# Patient Record
Sex: FEMALE | Race: WHITE | NOT HISPANIC OR LATINO | Employment: UNEMPLOYED | URBAN - METROPOLITAN AREA
[De-identification: names, ages, dates, MRNs, and addresses within clinical notes are randomized per-mention and may not be internally consistent; named-entity substitution may affect disease eponyms.]

---

## 2020-01-29 ENCOUNTER — HOSPITAL ENCOUNTER (EMERGENCY)
Facility: HOSPITAL | Age: 40
Discharge: HOME | End: 2020-01-29
Attending: EMERGENCY MEDICINE
Payer: MEDICARE

## 2020-01-29 VITALS
DIASTOLIC BLOOD PRESSURE: 90 MMHG | OXYGEN SATURATION: 98 % | WEIGHT: 200 LBS | TEMPERATURE: 98 F | HEART RATE: 90 BPM | RESPIRATION RATE: 15 BRPM | SYSTOLIC BLOOD PRESSURE: 140 MMHG

## 2020-01-29 DIAGNOSIS — M25.50 ARTHRALGIA: ICD-10-CM

## 2020-01-29 DIAGNOSIS — J11.1 INFLUENZA-LIKE ILLNESS: ICD-10-CM
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
## 2020-01-29 PROCEDURE — 99283 EMERGENCY DEPT VISIT LOW MDM: CPT

## 2020-01-29 PROCEDURE — 99284 EMERGENCY DEPT VISIT MOD MDM: CPT | Performed by: EMERGENCY MEDICINE

## 2020-01-29 RX ORDER — CLONAZEPAM 0.5 MG/1
0.5 TABLET ORAL DAILY
COMMUNITY

## 2020-01-29 RX ORDER — MULTIVITAMIN
1 TABLET ORAL DAILY
COMMUNITY

## 2020-01-29 RX ORDER — FUROSEMIDE 20 MG/1
20 TABLET ORAL DAILY
COMMUNITY

## 2020-01-29 RX ORDER — IBUPROFEN 200 MG
200 TABLET ORAL EVERY 6 HOURS PRN
COMMUNITY

## 2020-01-29 RX ORDER — MECLIZINE HYDROCHLORIDE 25 MG/1
25 TABLET ORAL EVERY 12 HOURS PRN
COMMUNITY

## 2020-01-29 RX ORDER — ACETAMINOPHEN 325 MG/1
650 TABLET ORAL EVERY 6 HOURS PRN
COMMUNITY

## 2020-01-29 RX ORDER — DIVALPROEX SODIUM 500 MG/1
1000 TABLET, EXTENDED RELEASE ORAL
COMMUNITY

## 2020-01-29 RX ORDER — SERTRALINE HYDROCHLORIDE 100 MG/1
100 TABLET, FILM COATED ORAL DAILY
COMMUNITY

## 2020-01-29 RX ORDER — ALBUTEROL SULFATE 90 UG/1
2 AEROSOL, METERED RESPIRATORY (INHALATION) EVERY 6 HOURS PRN
COMMUNITY

## 2020-01-29 RX ORDER — IBUPROFEN 400 MG/1
800 TABLET ORAL ONCE
Status: COMPLETED | OUTPATIENT
Start: 2020-01-29 | End: 2020-01-29

## 2020-01-29 RX ORDER — CLONAZEPAM 1 MG/1
1 TABLET ORAL 2 TIMES DAILY
COMMUNITY

## 2020-01-29 RX ORDER — CHOLECALCIFEROL (VITAMIN D3) 50 MCG
2000 TABLET ORAL DAILY
COMMUNITY

## 2020-01-29 RX ORDER — DOCUSATE SODIUM 100 MG/1
100 CAPSULE, LIQUID FILLED ORAL DAILY
COMMUNITY

## 2020-01-29 RX ORDER — CHLORAL HYDRATE 500 MG
1000 CAPSULE ORAL 3 TIMES DAILY
COMMUNITY

## 2020-01-29 RX ORDER — ACETAMINOPHEN 325 MG/1
975 TABLET ORAL ONCE
Status: COMPLETED | OUTPATIENT
Start: 2020-01-29 | End: 2020-01-29

## 2020-01-29 RX ORDER — HYDROCORTISONE 5 MG/1
15 TABLET ORAL
COMMUNITY

## 2020-01-29 RX ORDER — TRIAMCINOLONE ACETONIDE 1 MG/G
CREAM TOPICAL 2 TIMES DAILY PRN
COMMUNITY

## 2020-01-29 RX ORDER — QUETIAPINE 400 MG/1
400 TABLET, FILM COATED, EXTENDED RELEASE ORAL
COMMUNITY
End: 2022-03-21 | Stop reason: ALTCHOICE

## 2020-01-29 RX ADMIN — IBUPROFEN 800 MG: 400 TABLET ORAL at 13:21

## 2020-01-29 RX ADMIN — ACETAMINOPHEN 975 MG: 325 TABLET ORAL at 13:21

## 2020-01-29 NOTE — ED PROVIDER NOTES
History  Chief Complaint   Patient presents with   Tari Seller Fall     pt was [place] lost footing and fellon the buttock,c/o left shoulder pain     71-year-old female with history of mental retardation presenting to the emergency department for evaluation of arm and shoulder pain after fall  Patient comes with staff member from a group home where she resides, states that she had a trip and fall, the patient states that she caught herself the right wrist but has no right wrist pain  No head strike  No loss of consciousness  Patient says that she has also had some flu-like symptoms dizziness cough congestion for the past several days  No breathing difficulty, no fevers  Prior to Admission Medications   Prescriptions Last Dose Informant Patient Reported? Taking? albuterol (PROVENTIL HFA,VENTOLIN HFA) 90 mcg/act inhaler   Yes Yes   Sig: Inhale 2 puffs every 6 (six) hours as needed for wheezing      Facility-Administered Medications: None       Past Medical History:   Diagnosis Date    Adrenocortical insufficiency (HCC)     Disease of thyroid gland     hypo    Disorder of bone density and structure, unspecified     Epile[facility] [address] [facility]     Hyperlipidemia     [facility]     Mild intellectual disabilities     Psychiatric disorder     bipolar       History reviewed  No pertinent surgical history  History reviewed  No pertinent family history  I have reviewed and agree with the history as documented  Social History     Tobacco Use    Smoking status: Never Smoker    Smokeless tobacco: Never Used   Substance Use Topics    Alcohol use: Not Currently    Drug use: Not Currently        Review of Systems   Constitutional: Negative for appetite change, chills, fatigue and fever  HENT: Negative for sneezing and sore throat  Eyes: Negative for visual disturbance  Respiratory: Negative for cough, choking, chest tightness, shortness of breath and wheezing      Cardiovascular: Negative for chest pain and palpitations  Gastrointestinal: Negative for abdominal pain, constipation, diarrhea, nausea and vomiting  Genitourinary: Negative for difficulty urinating and dysuria  Musculoskeletal: Positive for arthralgias  Neurological: Positive for dizziness  Negative for weakness, light-headedness, numbness and headaches  All other systems reviewed and are negative  Physical Exam  Physical Exam   Constitutional: She is oriented to person, place, and time  She appears well-developed and well-nourished  No distress  HENT:   Head: Normocephalic and atraumatic  Mouth/Throat: Oropharynx is clear and moist    Eyes: Pupils are equal, round, and reactive to light  EOM are normal    Neck: No JVD present  No tracheal deviation present  Cardiovascular: Normal rate, regular rhythm, normal heart sounds and intact distal pulses  Exam reveals no gallop and no friction rub  No murmur heard  Pulmonary/Chest: Effort normal and breath sounds normal  No respiratory distress  She has no wheezes  She has no rales  Abdominal: Soft  Bowel sounds are normal  She exhibits no distension  There is no tenderness  There is no rebound and no guarding  Neurological: She is alert and oriented to person, place, and time  No cranial nerve deficit  She exhibits normal muscle tone  Skin: Skin is warm and dry  She is not diaphoretic  No pallor  Psychiatric: She has a normal mood and affect  Her behavior is normal    Nursing note and vitals reviewed        Vital Signs  ED Triage Vitals [01/29/20 1252]   Temperature Pulse Respirations Blood Pressure SpO2   98 °F (36 7 °C) 90 15 140/90 98 %      Temp Source Heart Rate Source Patient Position - Orthostatic VS BP Location FiO2 (%)   Oral Monitor Sitting Left arm --      Pain Score       Worst Possible Pain           Vitals:    01/29/20 1252   BP: 140/90   Pulse: 90   Patient Position - Orthostatic VS: Sitting         Visual Acuity      ED Medications  Medications   acetaminophen (TYLENOL) tablet 975 mg (975 mg Oral Incomplete 1/29/20 1321)   ibuprofen (MOTRIN) tablet 800 mg (800 mg Oral Incomplete 1/29/20 1321)       Diagnostic Studies  Results Reviewed     None                 No orders to display              Procedures  Procedures         ED Course                               MDM  Number of Diagnoses or Management Options  Arthralgia:   Fall:   Influenza-like illness:   Diagnosis management comments: 60-year-old female status post trip and fall, also has some URI symptoms, well-appearing here, no tenderness on exam, will give Tylenol Motrin for congestion,  stable for discharge back to group home          Disposition  Final diagnoses:   Fall   Arthralgia   Influenza-like illness     Time reflects when diagnosis was documented in both MDM as applicable and the Disposition within this note     Time User Action Codes Description Comment    1/29/2020  1:36 PM Janice Helton Add [H90  XXXA] Fall     1/29/2020  1:36 PM Jun Dalton Add [M25 50] Arthralgia     1/29/2020  1:37 PM Janice Helton Add [R69] Influenza-like illness       ED Disposition     ED Disposition Condition Date/Time Comment    Discharge Stable Wed Jan 29, 2020  1:36 PM [address]  discharge to home/self care  Follow-up Information    None         Patient's Medications   Discharge Prescriptions    No medications on file     No discharge procedures on file      ED Provider  Electronically Signed by           Adrian Aleman MD  01/29/20 9464

## 2021-05-17 ENCOUNTER — TELEPHONE (OUTPATIENT)
Dept: PAIN MEDICINE | Facility: CLINIC | Age: 41
End: 2021-05-17

## 2021-05-17 ENCOUNTER — APPOINTMENT (EMERGENCY)
Dept: RADIOLOGY | Facility: HOSPITAL | Age: 41
End: 2021-05-17
Payer: MEDICARE

## 2021-05-17 ENCOUNTER — HOSPITAL ENCOUNTER (EMERGENCY)
Facility: HOSPITAL | Age: 41
Discharge: HOME | End: 2021-05-17
Attending: EMERGENCY MEDICINE | Admitting: EMERGENCY MEDICINE
Payer: MEDICARE

## 2021-05-17 VITALS
TEMPERATURE: 98.2 F | SYSTOLIC BLOOD PRESSURE: 123 MMHG | WEIGHT: 199.96 LBS | OXYGEN SATURATION: 100 % | DIASTOLIC BLOOD PRESSURE: 83 MMHG | HEART RATE: 96 BPM | RESPIRATION RATE: 16 BRPM

## 2021-05-17 DIAGNOSIS — M54.9 BACK PAIN: Primary | ICD-10-CM

## 2021-05-17 DIAGNOSIS — M25.551 RIGHT HIP PAIN: ICD-10-CM

## 2021-05-17 LAB
BILIRUB UR QL STRIP: NEGATIVE
CLARITY UR: CLEAR
COLOR UR: YELLOW
GLUCOSE UR STRIP-MCNC: NEGATIVE MG/DL
HGB UR QL STRIP.AUTO: NEGATIVE
KETONES UR STRIP-MCNC: NEGATIVE MG/DL
LEUKOCYTE ESTERASE UR QL STRIP: NEGATIVE
NITRITE UR QL STRIP: NEGATIVE
PH UR STRIP.AUTO: 7 [PH]
PROT UR STRIP-MCNC: NEGATIVE MG/DL
SP GR UR STRIP.AUTO: 1.02 (ref 1–1.03)
UROBILINOGEN UR QL STRIP.AUTO: 0.2 E.U./DL

## 2021-05-17 PROCEDURE — 72100 X-RAY EXAM L-S SPINE 2/3 VWS: CPT

## 2021-05-17 PROCEDURE — 73502 X-RAY EXAM HIP UNI 2-3 VIEWS: CPT

## 2021-05-17 PROCEDURE — 99284 EMERGENCY DEPT VISIT MOD MDM: CPT

## 2021-05-17 PROCEDURE — 99285 EMERGENCY DEPT VISIT HI MDM: CPT | Performed by: PHYSICIAN ASSISTANT

## 2021-05-17 PROCEDURE — 81003 URINALYSIS AUTO W/O SCOPE: CPT | Performed by: PHYSICIAN ASSISTANT

## 2021-05-17 RX ORDER — LIDOCAINE 50 MG/G
PATCH TOPICAL
Qty: 15 PATCH | Refills: 0 | Status: SHIPPED | OUTPATIENT
Start: 2021-05-17 | End: 2021-05-28 | Stop reason: SDUPTHER

## 2021-05-17 RX ORDER — LIDOCAINE 50 MG/G
1 PATCH TOPICAL ONCE
Status: DISCONTINUED | OUTPATIENT
Start: 2021-05-17 | End: 2021-05-17 | Stop reason: HOSPADM

## 2021-05-17 RX ADMIN — LIDOCAINE 5% 1 PATCH: 700 PATCH TOPICAL at 10:39

## 2021-05-17 NOTE — TELEPHONE ENCOUNTER
[facility] nurse station   [phone]  Dr Flako Cullen     No pain med's, No pain Dr's    Pt understands disclaimer  Cecilio fax over np pk to [employer] for RONN Segovia on 5/28/21      Fax: [phone]

## 2021-05-17 NOTE — ED NOTES
Pt ambulatory to the restroom to provide a urine sample   Pt had a steady gait and did not require any assistance      Bernarda Varela RN  05/17/21 2068

## 2021-05-17 NOTE — ED PROVIDER NOTES
History  Chief Complaint   Patient presents with    Leg Pain     acute on chronic leg/hip/back pain, primarily on the R  This is a 44-year-old female patient with history of intellectual delay  Patient has a chronic irregular gait in which her right foot is rotated outward  She walked in under her own power without any distress I visualized this myself  Patient presents with acute on chronic low back pain and right hip pain  Caretaker states it has been no change in bowel or bladder  No numbness or tingling  The patient states whenever she walks she has pain in her right low back in the right hip  She describes pain or leg but she points mostly to her hip and low back and denies any numbness or tingling or weakness  The pain has worsened over last 2 weeks and she has not been seen by her family doctor  They have been giving her Motrin and Tylenol at home for the discomfort according to the patient the seem to help a little     The caretaker says no fever chills  The patient denies any headache blurred vision double vision cough congestion sore throat nausea vomiting diarrhea abdominal pain no chest pain or shortness of breath no urinary symptoms  Patient takes daily cortical steroids for adrenal insufficiency  Prior to Admission Medications   Prescriptions Last Dose Informant Patient Reported? Taking?    Calcium Carbonate-Vitamin D (CALCIUM 500 + D) 500-125 MG-UNIT TABS   Yes No   Sig: Take 1 tablet by mouth 2 (two) times a day   Cholecalciferol (VITAMIN D) 50 MCG (2000 UT) tablet   Yes No   Sig: Take 2,000 Units by mouth daily   Glycerin-Polysorbate 80 (REFRESH DRY EYE THERAPY OP)   Yes No   Sig: Apply 1 application to eye 2 (two) times a day   HYDROCORTISONE PO   Yes No   Sig: Take 5 mg by mouth daily   Levothyroxine Sodium (SYNTHROID PO)   Yes No   Sig: Take 224 mcg by mouth daily   Levothyroxine Sodium (SYNTHROID PO)   Yes No   Sig: Take 336 mcg by mouth   Multiple Vitamin (MULTIVITAMIN) tablet   Yes No   Sig: Take 1 tablet by mouth daily   Omega-3 Fatty Acids (FISH OIL) 1,000 mg   Yes No   Sig: Take 1,000 mg by mouth 3 (three) times a day   QUEtiapine (SEROquel XR) 400 mg 24 hr tablet   Yes No   Sig: Take 400 mg by mouth daily at bedtime   acetaminophen (TYLENOL) 325 mg tablet   Yes No   Sig: Take 650 mg by mouth every 6 (six) hours as needed for mild pain   albuterol (PROVENTIL HFA,VENTOLIN HFA) 90 mcg/act inhaler   Yes No   Sig: Inhale 2 puffs every 6 (six) hours as needed for wheezing   clonazePAM (KlonoPIN) 0 5 mg tablet   Yes No   Sig: Take 0 5 mg by mouth daily   clonazePAM (KlonoPIN) 1 mg tablet   Yes No   Sig: Take 1 mg by mouth 2 (two) times a day   divalproex sodium (DEPAKOTE ER) 500 mg 24 hr tablet   Yes No   Sig: Take 1,000 mg by mouth daily at bedtime   docusate sodium (COLACE) 100 mg capsule   Yes No   Sig: Take 100 mg by mouth daily   furosemide (LASIX) 20 mg tablet   Yes No   Sig: Take 20 mg by mouth daily   hydrocortisone (CORTEF) 5 mg tablet   Yes No   Sig: Take 15 mg by mouth daily with breakfast   ibuprofen (MOTRIN) 200 mg tablet   Yes No   Sig: Take 200 mg by mouth every 6 (six) hours as needed for mild pain   meclizine (ANTIVERT) 25 mg tablet   Yes No   Sig: Take 25 mg by mouth every 12 (twelve) hours as needed for dizziness   mupirocin (BACTROBAN) 2 % ointment   Yes No   Sig: Apply 1 application topically 3 (three) times a day   sertraline (ZOLOFT) 100 mg tablet   Yes No   Sig: Take 100 mg by mouth daily   sertraline (ZOLOFT) 50 mg tablet   Yes No   Sig: Take 50 mg by mouth daily at bedtime   triamcinolone (KENALOG) 0 1 % cream   Yes No   Sig: Apply topically 2 (two) times a day as needed (ezcema)      Facility-Administered Medications: None       Past Medical History:   Diagnosis Date    Adrenocortical insufficiency (HCC)     Disease of thyroid gland     hypo    Disorder of bone density and structure, unspecified     Epilepsy (HCC)     Hyperlipidemia     [facility] [address] [facility]     Mild intellectual disabilities     Psychiatric disorder     bipolar       History reviewed  No pertinent surgical history  History reviewed  No pertinent family history  I have reviewed and agree with the history as documented  E-Cigarette/Vaping     E-Cigarette/Vaping Substances     Social History     Tobacco Use    Smoking status: Never Smoker    Smokeless tobacco: Never Used   Substance Use Topics    Alcohol use: Not Currently    Drug use: Not Currently       Review of Systems   Constitutional: Negative for fatigue and fever  HENT: Negative for congestion and hearing loss  Eyes: Negative for photophobia and pain  Respiratory: Negative for cough and chest tightness  Cardiovascular: Negative for chest pain and leg swelling  Gastrointestinal: Negative for abdominal pain, diarrhea and nausea  Endocrine: Negative for polydipsia and polyphagia  Genitourinary: Negative for dysuria and frequency  Musculoskeletal: Positive for back pain and gait problem  Negative for arthralgias  Skin: Negative for pallor and rash  Allergic/Immunologic: Negative for environmental allergies and food allergies  Neurological: Negative for dizziness and headaches  Psychiatric/Behavioral: Negative for agitation and confusion  Physical Exam  Physical Exam  Vitals signs and nursing note reviewed  Constitutional:       Appearance: She is well-developed  HENT:      Head: Normocephalic and atraumatic  Right Ear: Tympanic membrane, ear canal and external ear normal       Left Ear: Tympanic membrane, ear canal and external ear normal       Nose: Nose normal       Mouth/Throat:      Mouth: Mucous membranes are moist       Pharynx: Oropharynx is clear  No oropharyngeal exudate or posterior oropharyngeal erythema  Eyes:      Conjunctiva/sclera: Conjunctivae normal       Pupils: Pupils are equal, round, and reactive to light     Neck:      Musculoskeletal: Normal range of motion and neck supple  Cardiovascular:      Rate and Rhythm: Normal rate and regular rhythm  Pulmonary:      Effort: Pulmonary effort is normal       Breath sounds: Normal breath sounds  Abdominal:      General: Bowel sounds are normal       Palpations: Abdomen is soft  Tenderness: There is no abdominal tenderness  Musculoskeletal: Normal range of motion  Back:         Legs:    Skin:     General: Skin is warm  Capillary Refill: Capillary refill takes less than 2 seconds  Neurological:      Mental Status: She is alert  Psychiatric:         Behavior: Behavior normal          Vital Signs  ED Triage Vitals [05/17/21 1014]   Temperature Pulse Respirations Blood Pressure SpO2   98 2 °F (36 8 °C) 96 16 123/83 100 %      Temp src Heart Rate Source Patient Position - Orthostatic VS BP Location FiO2 (%)   -- -- -- -- --      Pain Score       --           Vitals:    05/17/21 1014   BP: 123/83   Pulse: 96         Visual Acuity      ED Medications  Medications   lidocaine (LIDODERM) 5 % patch 1 patch (1 patch Topical Medication Applied 5/17/21 1039)       Diagnostic Studies  Results Reviewed     Procedure Component Value Units Date/Time    UA w Reflex to Microscopic w Reflex to Culture [[id number]] Collected: 05/17/21 1147    Lab Status: Final result Specimen: Urine, Clean Catch Updated: 05/17/21 1159     Color, UA Yellow     Clarity, UA Clear     Specific Freedom, UA 1 020     pH, UA 7 0     Leukocytes, UA Negative     Nitrite, UA Negative     Protein, UA Negative mg/dl      Glucose, UA Negative mg/dl      Ketones, UA Negative mg/dl      Urobilinogen, UA 0 2 E U /dl      Bilirubin, UA Negative     Blood, UA Negative                 XR lumbar spine 2 or 3 views   ED Interpretation by Will Rm PA-C (05/17 1101)   No acute fracture compression    Slight dextroscoliosis appears to have some degenerative changes L5-S1      XR hip/pelv 2-3 vws right   ED Interpretation by Will Rm CEE (05/17 1101)   No fracture dislocation some degenerative changes noted                 Procedures  Procedures         ED Course                             SBIRT 20yo+      Most Recent Value   SBIRT (24 yo +)   In order to provide better care to our patients, we are screening all of our patients for alcohol and drug use  Would it be okay to ask you these screening questions? No Filed at: 05/17/2021 1029                    MDM    Disposition  Final diagnoses:   Back pain   Right hip pain     Time reflects when diagnosis was documented in both MDM as applicable and the Disposition within this note     Time User Action Codes Description Comment    5/17/2021 12:02 PM Trudi Curiel Add [M54 9] Back pain     5/17/2021 12:02 PM [place], [address] Right hip pain       ED Disposition     ED Disposition Condition Date/Time Comment    Discharge Stable Mon May 17, 2021 12:02 PM [address]  discharge to home/self care  Follow-up Information     Follow up With Specialties Details Why Contact Info Additional Information    [facility] Schedule an appointment as soon as possible for a visit in 2 days  [address] [facility] [id number] [address], [address] [phone]          Patient's Medications   Discharge Prescriptions    LIDOCAINE (LIDODERM) 5 %    Half of patch to right low back and half of patch to right hip 12 hours on 12 hours off       Start Date: 5/17/2021 End Date: --       Order Dose: --       Quantity: 15 patch    Refills: 0     No discharge procedures on file      PDMP Review     None          ED Provider  Electronically Signed by           Author CEE Mcgarry  05/17/21 6985

## 2021-05-28 ENCOUNTER — CONSULT (OUTPATIENT)
Dept: PAIN MEDICINE | Facility: CLINIC | Age: 41
End: 2021-05-28
Payer: MEDICARE

## 2021-05-28 ENCOUNTER — TELEPHONE (OUTPATIENT)
Dept: PAIN MEDICINE | Facility: CLINIC | Age: 41
End: 2021-05-28

## 2021-05-28 VITALS
WEIGHT: 261 LBS | RESPIRATION RATE: 16 BRPM | DIASTOLIC BLOOD PRESSURE: 85 MMHG | HEART RATE: 98 BPM | HEIGHT: 68 IN | BODY MASS INDEX: 39.56 KG/M2 | SYSTOLIC BLOOD PRESSURE: 114 MMHG

## 2021-05-28 DIAGNOSIS — G89.29 CHRONIC BILATERAL LOW BACK PAIN WITH RIGHT-SIDED SCIATICA: Primary | ICD-10-CM

## 2021-05-28 DIAGNOSIS — M54.41 CHRONIC BILATERAL LOW BACK PAIN WITH RIGHT-SIDED SCIATICA: Primary | ICD-10-CM

## 2021-05-28 PROCEDURE — 99204 OFFICE O/P NEW MOD 45 MIN: CPT | Performed by: STUDENT IN AN ORGANIZED HEALTH CARE EDUCATION/TRAINING PROGRAM

## 2021-05-28 RX ORDER — LIDOCAINE 50 MG/G
PATCH TOPICAL
Qty: 15 PATCH | Refills: 0 | Status: SHIPPED | OUTPATIENT
Start: 2021-05-28 | End: 2021-05-28 | Stop reason: SDUPTHER

## 2021-05-28 RX ORDER — LIDOCAINE 50 MG/G
PATCH TOPICAL
Qty: 15 PATCH | Refills: 0 | Status: SHIPPED | OUTPATIENT
Start: 2021-05-28 | End: 2021-07-08 | Stop reason: SDUPTHER

## 2021-05-28 RX ORDER — MELOXICAM 15 MG/1
15 TABLET ORAL DAILY PRN
Qty: 14 TABLET | Refills: 0 | Status: SHIPPED | OUTPATIENT
Start: 2021-05-28 | End: 2022-03-21 | Stop reason: SDUPTHER

## 2021-05-28 NOTE — PATIENT INSTRUCTIONS
Meloxicam (By mouth)   Meloxicam (dxw-CG-c-pedrito)  Treats symptoms of osteoarthritis (OA) and rheumatoid arthritis (RA)  This medicine is an NSAID  Brand Name(s): Mobic, Qmiiz, Vivlodex   There may be other brand names for this medicine  When This Medicine Should Not Be Used: This medicine is not right for everyone  Do not use it if you had an allergic reaction to meloxicam or another NSAID drug, including aspirin  Do not use it right before or after a heart surgery, called coronary artery bypass graft (CABG)  How to Use This Medicine:   Capsule, Liquid, Tablet, Dissolving Tablet  · Take your medicine as directed  Your dose may need to be changed several times to find what works best for you  · If this medicine upsets your stomach, take it with food or milk  · Capsule or tablet: Swallow whole  Do not crush, break, or chew it  · Disintegrating tablet: Make sure your hands are dry before you handle the tablet  Do not open the blister pack that contains the tablet until you are ready to use it  Peel back the foil on the blister to remove the tablet  Do not push the tablet through the foil  Place the tablet in your mouth or onto your tongue  It should melt quickly  Swallow the melted tablet with or without drinking any liquid  · Oral liquid: Shake the bottle gently before use  Measure the oral liquid medicine with a marked measuring spoon, oral syringe, or medicine cup  · This medicine should come with a Medication Guide  Ask your pharmacist for a copy if you do not have one  · Missed dose: Take a dose as soon as you remember  If it is almost time for your next dose, wait until then and take a regular dose  Do not take extra medicine to make up for a missed dose  · Store the medicine in a closed container at room temperature, away from heat, moisture, and direct light    Drugs and Foods to Avoid:   Ask your doctor or pharmacist before using any other medicine, including over-the-counter medicines, vitamins, and herbal products  · Do not use aspirin or any other NSAID medicine (including diflunisal, salsalate) unless your doctor says it is okay  · Do not take the oral liquid together with sodium polystyrene sulfonate  Meloxicam oral liquid contains sorbitol, which may cause a serious bowel problem if taken with sodium polystyrene sulfonate  · Some medicines can affect how meloxicam works  Tell your doctor if you are using any of the following:  ? Amiodarone, cholestyramine, cyclosporine, digoxin, fluconazole, lithium, methotrexate, pemetrexed, sulphaphenazole  ? Blood pressure medicine  ? Blood thinner (including warfarin)  ? Diuretic (water pill)  ? Medicine to treat depression (including SNRIs, SSRIs)  ? Steroid medicine (including hydrocortisone, methylprednisolone, prednisolone, prednisone)  Warnings While Using This Medicine:   · Tell your doctor if you are pregnant or breastfeeding  Do not use this medicine during the later part of a pregnancy unless your doctor tells you it is okay  · Tell your doctor if you have kidney disease, liver disease, heart disease, heart failure, asthma, bleeding problems, high blood pressure, any heart or blood vessel problems, phenylketonuria, a recent heart attack, or a history of ulcers or other stomach problems  Tell your doctor if you smoke or drink alcohol regularly  · This medicine may cause the following problems:  ? Increased risk of blood clots, heart attack, stroke, or heart failure  ? Bleeding problems, including stomach or bowel bleeding or ulcer  ? Liver problems  ? High blood pressure  ? Kidney problems  ? Serious skin reactions  · This medicine may cause a delay in ovulation for women and may decrease sperm count in men, which can affect their ability to have children  If you plan to have children, talk with your doctor before using this medicine  · Your doctor will do lab tests at regular visits to check on the effects of this medicine   Keep all appointments  · Keep all medicine out of the reach of children  Never share your medicine with anyone  Possible Side Effects While Using This Medicine:   Call your doctor right away if you notice any of these side effects:  · Allergic reaction: Itching or hives, swelling in your face or hands, swelling or tingling in your mouth or throat, chest tightness, trouble breathing  · Blistering, peeling, red skin rash  · Change in how much or how often you urinate, painful urination  · Chest pain, trouble breathing, coughing up blood  · Dark urine or pale stools, nausea, vomiting, loss of appetite, stomach pain, yellow skin or eyes  · Numbness or weakness on one side of your body, sudden or severe headache, problems with vision, speech, or walking  · Rapid weight gain, swelling in your hands, ankles, or feet  · Severe stomach pain, vomiting blood or material that looks like coffee grounds, bloody or black, tarry stools  · Unusual bleeding, bruising, or weakness  If you notice these less serious side effects, talk with your doctor:   · Mild nausea, diarrhea, heartburn  If you notice other side effects that you think are caused by this medicine, tell your doctor  Call your doctor for medical advice about side effects  You may report side effects to FDA at 3-509-FDA-3900  © Copyright [employer] 2021 Information is for End User's use only and may not be sold, redistributed or otherwise used for commercial purposes  The above information is an  only  It is not intended as medical advice for individual conditions or treatments  Talk to your doctor, nurse or pharmacist before following any medical regimen to see if it is safe and effective for you

## 2021-05-28 NOTE — PROGRESS NOTES
Assessment  1  Chronic bilateral low back pain with right-sided sciatica        Plan  This is a 69-year-old female who presents to our office with acute exacerbation of chronic low back pain  She is also describing right lower extremity radiculopathy symptoms radiating down the posterolateral  Lower extremity  Patient notably has intellectual delay in his company by her caretaker today who provides the bulk of the history  On exam, patient has tenderness to palpation of bilateral lumbar paraspinous musculature  Strength testing was noted for pain limited weakness  Of the right lower extremity  On previous CT, she appears to have grade 1 spondylolisthesis of L5 on S1 with foraminal narrowing  She may be clinically symptomatic from lumbar radiculopathy  Discussed with her she would be agreeable to any sort of injection therapy, however patient reports that she is afraid of needles and would not like to go down the route of injections  Therefore we will  Provide medical management the form of lidocaine patch, and I will also start meloxicam 15 mg daily p r n     Will only order 14 tablets through the acute portion of her symptoms  She normally takes ibuprofen as needed which I discussed stopping while taking meloxicam      will see her back in  4-6 weeks or sooner if medically necessary  If at that time has worsening symptoms or weakness, may be candidate for MRI of the lumbar spine  [facility] Prescription Drug Monitoring Program report was reviewed and was appropriate     My impressions and treatment recommendations were discussed in detail with the patient who verbalized understanding and had no further questions  Discharge instructions were provided  I personally saw and examined the patient and I agree with the above discussed plan of care  No orders of the defined types were placed in this encounter      New Medications Ordered This Visit   Medications    meloxicam (MOBIC) 15 mg tablet Sig: Take 1 tablet (15 mg total) by mouth daily as needed for moderate pain     Dispense:  14 tablet     Refill:  0    lidocaine (LIDODERM) 5 %     Sig: Half of patch to right low back and half of patch to right hip 12 hours on 12 hours off     Dispense:  15 patch     Refill:  0       History of Present Illness    Referring Provider: No ref  provider found    Riki Ahn is a 42 y o  female who presents with a chief complaint of   Right-sided back pain, hip pain and, and right leg pain  She recently reported to the ED for acute worsening of this chronic issue on 5/17/21  Has history of intellectual delay          Past medical history includes adrenal cortical insufficiency and is on chronic steroids for this  Also has history is hypothyroidism, epilepsy, hyperlipidemia, hypopituitarism, bipolar disorder, and mild intellectual disability  She is company with caretaker    Pain began of an undetermined cause  Intensity of the pain is moderate  Current pain is 10/10  Pain is intermittent  Pain is worse at nighttime  Described as sharp in nature  She does not ambulate assistive device  Pain is increased with standing, bending, sitting  No change with walking, exercise  Decreased with lying down and relaxation  In the ED, she had imaging done of the right hip and low back  Right hip x-ray showed mild degenerative arthritis of both hips  Lumbar x-ray shows mild degenerative spondylosis at L5-S1 mild scoliosis  The patient is reporting pain in the right leg that radiates down the right lower extremity and into the foot with pins and needles in the foot  She describes it in a posterolateral distribution, appears to be describing L5 dermatome  Past surgical history includes repair of the left distal tibia  Past medical history also includes asthma, high cholesterol, cerebral infarct at age 9, and osteopenia  She does not smoke tobacco or marijuana  Does drink alcohol    Not on blood thinners  Not allergic to latex  Unknown allergy to contrast dye  Current medications include acetaminophen and ibuprofen which do provide relief  Sign in the ED was noted to have tenderness to palpation over the right sacroiliac joint  Was not found to have significant weakness  Insulin insulin caretaker reports that pain fluctuates but overall has improved    I have personally reviewed and/or updated the patient's past medical history, past surgical history, family history, social history, current medications, allergies, and vital signs today  Review of Systems   Constitutional: Negative for fever and unexpected weight change  HENT: Negative for trouble swallowing  Eyes: Negative for visual disturbance  Respiratory: Negative for shortness of breath and wheezing  Cardiovascular: Negative for chest pain and palpitations  Gastrointestinal: Negative for constipation, diarrhea, nausea and vomiting  Endocrine: Negative for cold intolerance, heat intolerance and polydipsia  Genitourinary: Negative for difficulty urinating and frequency  Musculoskeletal: Positive for back pain  Negative for arthralgias, gait problem, joint swelling and myalgias  Right Hip & RLE Pain   Skin: Negative for rash  Neurological: Negative for dizziness, seizures, syncope, weakness and headaches  Hematological: Does not bruise/bleed easily  Psychiatric/Behavioral: Negative for dysphoric mood  All other systems reviewed and are negative  There is no problem list on file for this patient  Past Medical History:   Diagnosis Date    [facility]oc[facility] insufficiency [facility] [address] [facility]     Disease of thyroid gland     hypo    Disorder of bone density and structure, unspecified     [facility] ([facility]     Hyperlipidemia     Hypopituitarism ([facility] [address] [facility]     Mild intellectual disabilities     Psychiatric disorder     bipolar       History reviewed  No pertinent surgical history  History reviewed   No pertinent family history      Social History     Occupational History    Not on file   Tobacco Use    Smoking status: Never Smoker    Smokeless tobacco: Never Used   Substance and Sexual Activity    Alcohol use: Not Currently    Drug use: Not Currently    Sexual activity: Not on file       Current Outpatient Medications on File Prior to Visit   Medication Sig    acetaminophen (TYLENOL) 325 mg tablet Take 650 mg by mouth every 6 (six) hours as needed for mild pain    albuterol (PROVENTIL HFA,VENTOLIN HFA) 90 mcg/act inhaler Inhale 2 puffs every 6 (six) hours as needed for wheezing    Calcium Carbonate-Vitamin D (CALCIUM 500 + D) 500-125 MG-UNIT TABS Take 1 tablet by mouth 2 (two) times a day    Cholecalciferol (VITAMIN D) 50 MCG (2000 UT) tablet Take 2,000 Units by mouth daily    clonazePAM (KlonoPIN) 0 5 mg tablet Take 0 5 mg by mouth daily    clonazePAM (KlonoPIN) 1 mg tablet Take 1 mg by mouth 2 (two) times a day    divalproex sodium (DEPAKOTE ER) 500 mg 24 hr tablet Take 1,000 mg by mouth daily at bedtime    docusate sodium (COLACE) 100 mg capsule Take 100 mg by mouth daily    furosemide (LASIX) 20 mg tablet Take 20 mg by mouth daily    Glycerin-Polysorbate 80 (REFRESH DRY EYE THERAPY OP) Apply 1 application to eye 2 (two) times a day    hydrocortisone (CORTEF) 5 mg tablet Take 15 mg by mouth daily with breakfast    HYDROCORTISONE PO Take 5 mg by mouth daily    ibuprofen (MOTRIN) 200 mg tablet Take 200 mg by mouth every 6 (six) hours as needed for mild pain    Levothyroxine Sodium (SYNTHROID PO) Take 224 mcg by mouth daily    Levothyroxine Sodium (SYNTHROID PO) Take 336 mcg by mouth    meclizine (ANTIVERT) 25 mg tablet Take 25 mg by mouth every 12 (twelve) hours as needed for dizziness    Multiple Vitamin (MULTIVITAMIN) tablet Take 1 tablet by mouth daily    mupirocin (BACTROBAN) 2 % ointment Apply 1 application topically 3 (three) times a day    Omega-3 Fatty Acids (FISH OIL) 1,000 mg Take 1,000 mg by mouth 3 (three) times a day    QUEtiapine (SEROquel XR) 400 mg 24 hr tablet Take 400 mg by mouth daily at bedtime    sertraline (ZOLOFT) 100 mg tablet Take 100 mg by mouth daily    sertraline (ZOLOFT) 50 mg tablet Take 50 mg by mouth daily at bedtime    triamcinolone (KENALOG) 0 1 % cream Apply topically 2 (two) times a day as needed (ezcema)    [DISCONTINUED] lidocaine (LIDODERM) 5 % Half of patch to right low back and half of patch to right hip 12 hours on 12 hours off (Patient not taking: Reported on 5/28/2021)     No current facility-administered medications on file prior to visit  Allergies   Allergen Reactions    Haldol [Haloperidol] Other (See Comments)     dyskenesia    Thioridazine     Tomato - Food Allergy        Physical Exam    /85   Pulse 98   Resp 16   Ht 5' 8" (1 727 m)   Wt 118 kg (261 lb)   BMI 39 68 kg/m²     Constitutional: normal, well developed, well nourished, alert, in no distress and non-toxic and no overt pain behavior    Eyes: anicteric  HEENT: grossly intact  Neck: supple, symmetric, trachea midline and no masses   Pulmonary:even and unlabored  Cardiovascular:No edema or pitting edema present  Skin:Normal without rashes or lesions and well hydrated  Psychiatric:Mood and affect appropriate  Neurologic:Cranial Nerves II-XII grossly intact  Musculoskeletal:normal     Lumbar Spine Exam    Appearance:  Normal lordosis  Palpation/Tenderness:  left lumbar paraspinal tenderness  right lumbar paraspinal tenderness  left sacroiliac joint tenderness  right sacroiliac joint tenderness  Sensory:  no sensory deficits noted  Range of Motion:  Not assessed  Motor Strength:  Left hip flexion:  4/5  Left hip extension:  4/5  Right hip flexion:  4/5  Right hip extension:  4/5  Left knee flexion:  4/5  Left knee extension:  4/5  Right knee flexion:  4/5  Right knee extension:  4/5  Left foot dorsiflexion:  4/5  Left foot plantar flexion:  4/5  Right foot dorsiflexion:  4/5  Right foot plantar flexion:  4/5     Weakness due to poor effort and pain limitation  Reflexes:  Left Patellar:  2+   Right Patellar:  2+   Left Achilles:  2+   Right Achilles:  2+     DIAGNOSTIC IMAGING AND TEST RESULTS:    Study Result    RIGHT HIP     INDICATION:   pain      COMPARISON:  None     VIEWS:  XR HIP/PELV 2-3 VWS RIGHT W PELVIS IF PERFORMED   Images: 3     FINDINGS:     There is no acute fracture or dislocation      Mild degenerative arthritis both hips     No lytic or blastic osseous lesion      Soft tissues are unremarkable      The visualized lumbar spine is unremarkable      IMPRESSION:     No acute osseous abnormality         Study Result    LUMBAR SPINE     INDICATION:   pain  COMPARISON:  None     VIEWS:  XR SPINE LUMBAR 2 OR 3 VIEWS INJURY  Images: 3     FINDINGS:     There are 5 non rib bearing lumbar vertebral bodies       There is no evidence of acute fracture or destructive osseous lesion      Minimal thoracolumbar levoscoliosis, apex T12-L1     Mild degenerative spondylosis L5-S1     The pedicles appear intact      Soft tissues are unremarkable      IMPRESSION:  Mild degenerative spondylosis L5-S1, mild thoracolumbar levoscoliosis        NO CHARGE CT LUMBAR [address]  Result Impression   Impression:    No acute appearing thoracic or lumbar vertebral body fracture  CT examination performed with dose lowering protocol in accordance with [facility]  Workstation:[id number]   Result Narrative   CT thoracic and lumbar spine reconstructions    History: Trauma  Back and low back pain  Comparison: None  Technique: Sagittal, axial, and coronal reconstructions of the thoracic and  lumbar spine were performed  Report: There is osteopenia  There are multilevel anterior endplate Schmorl's nodes  throughout the thoracic spine  There is no acute appearing thoracic vertebral  body fracture  There is no acute appearing lumbar vertebral body fracture   There  is a grade 1 anterolisthesis of L5 on S1 with severe bilateral L5-S1 bony  foraminal narrowing and vacuum disc phenomenon  There is a large anterior  superior Schmorl's node at S1, chronic  There is some minimal chronic appearing  posterior displacement of the coccyx/sacral coccygeal junction  Very minimal  motion artifact is noted  Other Result Information   Interface, Rad Results In - 11/15/2019  2:58 PM EST  CT thoracic and lumbar spine reconstructions    History: Trauma  Back and low back pain  Comparison: None  Technique: Sagittal, axial, and coronal reconstructions of the thoracic and  lumbar spine were performed  Report: There is osteopenia  There are multilevel anterior endplate Schmorl's nodes  throughout the thoracic spine  There is no acute appearing thoracic vertebral  body fracture  There is no acute appearing lumbar vertebral body fracture  There  is a grade 1 anterolisthesis of L5 on S1 with severe bilateral L5-S1 bony  foraminal narrowing and vacuum disc phenomenon  There is a large anterior  superior Schmorl's node at S1, chronic  There is some minimal chronic appearing  posterior displacement of the coccyx/sacral coccygeal junction  Very minimal  motion artifact is noted  IMPRESSION:  Impression:    No acute appearing thoracic or lumbar vertebral body fracture  CT examination performed with dose lowering protocol in accordance with [facility]

## 2021-05-28 NOTE — PROGRESS NOTES
Assessment  No diagnosis found  Plan  ***    {[facility] Statement:17553}    My impressions and treatment recommendations were discussed in detail with the patient who verbalized understanding and had no further questions  Discharge instructions were provided  I personally saw and examined the patient and I agree with the above discussed plan of care  No orders of the defined types were placed in this encounter  No orders of the defined types were placed in this encounter  History of Present Illness    Referring Provider: No ref  provider found    Isatu Goncalves is a 42 y o  female who presents with a chief complaint of ***  I have personally reviewed and/or updated the patient's past medical history, past surgical history, family history, social history, current medications, allergies, and vital signs today  Review of Systems    There is no problem list on file for this patient  Past Medical History:   Diagnosis Date    [facility]oc[facility] insufficiency [facility] [address] [facility]     Disease of thyroid gland     hypo    Disorder of bone density and structure, unspecified     [facility]ile[facility])     Hyperlipidemia     [facility]opituitarism [facility] [address] [facility]     Mild intellectual disabilities     Psychiatric disorder     bipolar       No past surgical history on file  No family history on file      Social History     Occupational History    Not on file   Tobacco Use    Smoking status: Never Smoker    Smokeless tobacco: Never Used   Substance and Sexual Activity    Alcohol use: Not Currently    Drug use: Not Currently    Sexual activity: Not on file       Current Outpatient Medications on File Prior to Visit   Medication Sig    acetaminophen (TYLENOL) 325 mg tablet Take 650 mg by mouth every 6 (six) hours as needed for mild pain    albuterol (PROVENTIL HFA,VENTOLIN HFA) 90 mcg/act inhaler Inhale 2 puffs every 6 (six) hours as needed for wheezing    Calcium Carbonate-Vitamin D (CALCIUM 500 + D) 500-125 MG-UNIT TABS Take 1 tablet by mouth 2 (two) times a day    Cholecalciferol (VITAMIN D) 50 MCG (2000 UT) tablet Take 2,000 Units by mouth daily    clonazePAM (KlonoPIN) 0 5 mg tablet Take 0 5 mg by mouth daily    clonazePAM (KlonoPIN) 1 mg tablet Take 1 mg by mouth 2 (two) times a day    divalproex sodium (DEPAKOTE ER) 500 mg 24 hr tablet Take 1,000 mg by mouth daily at bedtime    docusate sodium (COLACE) 100 mg capsule Take 100 mg by mouth daily    furosemide (LASIX) 20 mg tablet Take 20 mg by mouth daily    Glycerin-Polysorbate 80 (REFRESH DRY EYE THERAPY OP) Apply 1 application to eye 2 (two) times a day    hydrocortisone (CORTEF) 5 mg tablet Take 15 mg by mouth daily with breakfast    HYDROCORTISONE PO Take 5 mg by mouth daily    ibuprofen (MOTRIN) 200 mg tablet Take 200 mg by mouth every 6 (six) hours as needed for mild pain    Levothyroxine Sodium (SYNTHROID PO) Take 224 mcg by mouth daily    Levothyroxine Sodium (SYNTHROID PO) Take 336 mcg by mouth    lidocaine (LIDODERM) 5 % Half of patch to right low back and half of patch to right hip 12 hours on 12 hours off    meclizine (ANTIVERT) 25 mg tablet Take 25 mg by mouth every 12 (twelve) hours as needed for dizziness    Multiple Vitamin (MULTIVITAMIN) tablet Take 1 tablet by mouth daily    mupirocin (BACTROBAN) 2 % ointment Apply 1 application topically 3 (three) times a day    Omega-3 Fatty Acids (FISH OIL) 1,000 mg Take 1,000 mg by mouth 3 (three) times a day    QUEtiapine (SEROquel XR) 400 mg 24 hr tablet Take 400 mg by mouth daily at bedtime    sertraline (ZOLOFT) 100 mg tablet Take 100 mg by mouth daily    sertraline (ZOLOFT) 50 mg tablet Take 50 mg by mouth daily at bedtime    triamcinolone (KENALOG) 0 1 % cream Apply topically 2 (two) times a day as needed (ezcema)     No current facility-administered medications on file prior to visit          Allergies   Allergen Reactions    Haldol [Haloperidol] Other (See Comments)     dyskenesia    Thioridazine     Tomato - Food Allergy        Physical Exam    There were no vitals taken for this visit  Constitutional: {General Appearance:57977::"normal, well developed, well nourished, alert, in no distress and non-toxic and no overt pain behavior  "}  Eyes: {Sclera:86172::"anicteric"}  HEENT: {Hearin::"grossly intact"}  Neck: {Neck:60795::"supple, symmetric, trachea midline and no masses "}  Pulmonary:{Respiratory effort:70513::"even and unlabored"}  Cardiovascular:{Examination of Extremities:43806::"No edema or pitting edema present"}  Skin:{Skin and Subcutaneous tissues:51317::"Normal without rashes or lesions and well hydrated"}  Psychiatric:{Mood and Affect:97094::"Mood and affect appropriate"}  Neurologic:{Cranial Nerves:61813::"Cranial Nerves II-XII grossly intact"}  Musculoskeletal:{Gait and Station:64127::"normal"}    DIAGNOSTIC IMAGING AND TEST RESU

## 2021-07-08 ENCOUNTER — TELEMEDICINE (OUTPATIENT)
Dept: PAIN MEDICINE | Facility: CLINIC | Age: 41
End: 2021-07-08
Payer: MEDICARE

## 2021-07-08 DIAGNOSIS — G89.29 CHRONIC BILATERAL LOW BACK PAIN WITH RIGHT-SIDED SCIATICA: ICD-10-CM

## 2021-07-08 DIAGNOSIS — M54.41 CHRONIC BILATERAL LOW BACK PAIN WITH RIGHT-SIDED SCIATICA: ICD-10-CM

## 2021-07-08 DIAGNOSIS — G89.4 CHRONIC PAIN SYNDROME: Primary | ICD-10-CM

## 2021-07-08 PROCEDURE — 99441 PR PHYS/QHP TELEPHONE EVALUATION 5-10 MIN: CPT | Performed by: NURSE PRACTITIONER

## 2021-07-08 RX ORDER — LIDOCAINE 50 MG/G
PATCH TOPICAL
Qty: 15 PATCH | Refills: 0 | Status: SHIPPED | OUTPATIENT
Start: 2021-07-08 | End: 2022-03-21

## 2021-07-08 NOTE — PATIENT INSTRUCTIONS
Lidocaine Patch (On the skin)   Lidocaine ([id number])  Treats nerve pain that is caused by herpes zoster or shingles  Brand Name(s): Aspercreme, First Care Pain Relief Gel Patch, Gen7T Patch, Lidocaine Novaplus, Lidocanna, Lidocare, Lidoderm, Pain Relief Patch, Pain Relieving Patches, Re-Lieved, Salonpas, ZTlido   There may be other brand names for this medicine  When This Medicine Should Not Be Used: This medicine is not right for everyone  Do not use it if you had an allergic reaction to lidocaine or similar medicines  How to Use This Medicine:   Patch  · Your doctor will tell you how many patches to use, where to apply them, and how often to apply them  Do not use more patches or apply them more often than your doctor tells you to  · This medicine should be used only on the skin  Do not get it in your eyes, nose, or mouth  If it does get on these areas, rinse it off with water or saline right away  · Keep the patch in its envelope until you are ready to put it on  You may cut the patch into a smaller size with scissors before you take off the patch liner  · Wash your hands with soap and water before and after applying a patch  · Apply the patch to clean, dry skin  Do not put the patch over burns, cuts, or irritated skin  · The patch will not stick if it gets wet  Do not wear it when you take a bath or shower, or when you go swimming  · Do not wear the patch for longer than 12 hours in any 24-hour period  · Store the patches at room temperature in a closed container, away from heat, moisture, and direct light  · Fold the used patch in half with the sticky sides together  Throw any used patch away so that children or pets cannot get to it  You will also need to throw away old patches after the expiration date has passed  Drugs and Foods to Avoid:   Ask your doctor or pharmacist before using any other medicine, including over-the-counter medicines, vitamins, and herbal products    · Some foods and medicines can affect how lidocaine works  Tell your doctor if you are using the following:  ? Medicine for heart rhythm problems, such as mexiletine  ? Other topical medicine  Warnings While Using This Medicine:   · Tell your doctor if you are pregnant or breastfeeding, or if you have liver disease  Tell your doctor if you are allergic to any other medicine  · Do not use a heating pad, electric blanket, or other heat source over the patch     · Keep all medicine out of the reach of children  Never share your medicine with anyone  Possible Side Effects While Using This Medicine:   Call your doctor right away if you notice any of these side effects:  · Allergic reaction: Itching or hives, swelling in your face or hands, swelling or tingling in your mouth or throat, chest tightness, trouble breathing  · Redness, itching, burning, swelling, or blisters where the patch is applied  If you notice other side effects that you think are caused by this medicine, tell your doctor  Call your doctor for medical advice about side effects  You may report side effects to FDA at 4-580-FDA-1084  © Copyright [facility] [facility] 2021 Information is for End User's use only and may not be sold, redistributed or otherwise used for commercial purposes  The above information is an  only  It is not intended as medical advice for individual conditions or treatments  Talk to your doctor, nurse or pharmacist before following any medical regimen to see if it is safe and effective for you

## 2021-07-08 NOTE — PROGRESS NOTES
Virtual Brief Visit    Assessment/Plan:  Arianna Hernadez is a 42 y o  female who was last seen 5/28/2021 in regards to  chronic pain syndrome secondary to bilateral low back pain with right-sided sciatica    A prescription for lidocaine 5% transdermal patches was electronically sent to the patient's pharmacy on file to be applied as needed for pain  The patient will follow-up if symptoms return or worsen,  or for prescription refills  The patient's caregiver was agreeable and verbalized understanding  Problem List Items Addressed This Visit     None      Visit Diagnoses     Chronic pain syndrome    -  Primary    Chronic bilateral low back pain with right-sided sciatica        Relevant Medications    lidocaine (LIDODERM) 5 %                Reason for visit is   Chief Complaint   Patient presents with    Virtual Brief Visit        Encounter provider LENO Stroud    Provider located at [address] [facility]  [address] [id number]    Recent Visits  No visits were found meeting these conditions  Showing recent visits within past 7 days and meeting all other requirements  Today's Visits  Date Type Provider Dept   07/08/21 Telemedicine LENO Stroud [facility] [place]   Showing today's visits and meeting all other requirements  Future Appointments  No visits were found meeting these conditions  Showing future appointments within next 150 days and meeting all other requirements       After connecting through telephone, the patient was identified by name and date of birth  Arianna Hernadez was informed that this is a telemedicine visit and that the visit is being conducted through telephone  My office door was closed  No one else was in the room  She acknowledged consent and understanding of privacy and security of the platform   The patient has agreed to participate and understands she can discontinue the visit at any time  It was my intent to perform this visit via video technology but the patient was not able to do a video connection so the visit was completed via audio telephone only  Patient is aware this is a billable service  Subjective    Jeff Coffey is a 42 y o  female who was last seen 5/28/2021 in regards to  chronic pain syndrome secondary to bilateral low back pain with right-sided sciatica  At the last OV the patient was start with lodocaine 5% TD patches, which Neelam (RN) reports has provided significant relief of the patient's back pain symptoms  The RN reports the patient generally has removed the patch midday, but has not reported any pain  The RN is requesting the patches be reordered as prn  The patient is no longer taking meloxicam which was also started at the last OV  HPI     Past Medical History:   Diagnosis Date    Adrenocortical insufficiency ([facility] [place] [address] )     Disease of thyroid gland     hypo    Disorder of bone density and structure, unspecified     [facility])     Hyperlipidemia     [facility]     Mild intellectual disabilities     Psychiatric disorder     bipolar       No past surgical history on file      Current Outpatient Medications   Medication Sig Dispense Refill    acetaminophen (TYLENOL) 325 mg tablet Take 650 mg by mouth every 6 (six) hours as needed for mild pain      albuterol (PROVENTIL HFA,VENTOLIN HFA) 90 mcg/act inhaler Inhale 2 puffs every 6 (six) hours as needed for wheezing      Calcium Carbonate-Vitamin D (CALCIUM 500 + D) 500-125 MG-UNIT TABS Take 1 tablet by mouth 2 (two) times a day      Cholecalciferol (VITAMIN D) 50 MCG (2000 UT) tablet Take 2,000 Units by mouth daily      clonazePAM (KlonoPIN) 0 5 mg tablet Take 0 5 mg by mouth daily      clonazePAM (KlonoPIN) 1 mg tablet Take 1 mg by mouth 2 (two) times a day      divalproex sodium (DEPAKOTE ER) 500 mg 24 hr tablet Take 1,000 mg by mouth daily at bedtime      docusate sodium (COLACE) 100 mg capsule Take 100 mg by mouth daily      furosemide (LASIX) 20 mg tablet Take 20 mg by mouth daily      Glycerin-Polysorbate 80 (REFRESH DRY EYE THERAPY OP) Apply 1 application to eye 2 (two) times a day      hydrocortisone (CORTEF) 5 mg tablet Take 15 mg by mouth daily with breakfast      HYDROCORTISONE PO Take 5 mg by mouth daily      ibuprofen (MOTRIN) 200 mg tablet Take 200 mg by mouth every 6 (six) hours as needed for mild pain      Levothyroxine Sodium (SYNTHROID PO) Take 224 mcg by mouth daily      Levothyroxine Sodium (SYNTHROID PO) Take 336 mcg by mouth      lidocaine (LIDODERM) 5 % Half of patch to right low back and half of patch to right hip 12 hours on 12 hours off as needed for pain 15 patch 0    meclizine (ANTIVERT) 25 mg tablet Take 25 mg by mouth every 12 (twelve) hours as needed for dizziness      meloxicam (MOBIC) 15 mg tablet Take 1 tablet (15 mg total) by mouth daily as needed for moderate pain 14 tablet 0    Multiple Vitamin (MULTIVITAMIN) tablet Take 1 tablet by mouth daily      mupirocin (BACTROBAN) 2 % ointment Apply 1 application topically 3 (three) times a day      Omega-3 Fatty Acids (FISH OIL) 1,000 mg Take 1,000 mg by mouth 3 (three) times a day      QUEtiapine (SEROquel XR) 400 mg 24 hr tablet Take 400 mg by mouth daily at bedtime      sertraline (ZOLOFT) 100 mg tablet Take 100 mg by mouth daily      sertraline (ZOLOFT) 50 mg tablet Take 50 mg by mouth daily at bedtime      triamcinolone (KENALOG) 0 1 % cream Apply topically 2 (two) times a day as needed (ezcema)       No current facility-administered medications for this visit  Allergies   Allergen Reactions    Haldol [Haloperidol] Other (See Comments)     dyskenesia    Thioridazine     Tomato - Food Allergy        Review of Systems    There were no vitals filed for this visit        I spent 8 minutes with patient today in which greater than 50% of the time was spent in counseling/coordination of care regarding medication management and treatment planning    VIRTUAL VISIT DISCLAIMER    Lauri Joyce acknowledges that she has consented to an online visit or consultation  She understands that the online visit is based solely on information provided by her, and that, in the absence of a face-to-face physical evaluation by the physician, the diagnosis she receives is both limited and provisional in terms of accuracy and completeness  This is not intended to replace a full medical face-to-face evaluation by the physician  Lauri Joyce understands and accepts these terms

## 2022-02-02 ENCOUNTER — TELEPHONE (OUTPATIENT)
Dept: PAIN MEDICINE | Facility: CLINIC | Age: 42
End: 2022-02-02

## 2022-02-02 NOTE — TELEPHONE ENCOUNTER
Looks like Laura Taylor last saw her 07/2021  Still notable amount of time since her last visit  They may want to consider having PCP manage this since it is the only thing we prescribed   Otherwise would need to see for OV

## 2022-02-02 NOTE — TELEPHONE ENCOUNTER
Pt contacted Call Center requested refill of their medication  Medication Name:lidocaine (LIDODERM)         Dosage of Med:5 %       Frequency of Med:Half of patch to right low back and half of patch to right hip 12 hours on 12 hours off as needed for pain      Remaining Medication: N/A      Pharmacy and Location:  [address] [facility], [address], [facility] [place] [address]   Phone:  [phone]        Pt  Preferred Callback Phone Eugenia Hankins      Thank you

## 2022-03-17 NOTE — PROGRESS NOTES
Pain Medicine Follow-Up Note    Assessment:  1  Chronic bilateral low back pain with bilateral sciatica        Plan:  Orders Placed This Encounter   Procedures    Ambulatory referral to Physical Therapy     Standing Status:   Future     Standing Expiration Date:   3/21/2023     Referral Priority:   Routine     Referral Type:   Physical Therapy     Referral Reason:   Specialty Services Required     Requested Specialty:   Physical Therapy     Number of Visits Requested:   1     Expiration Date:   3/21/2023       New Medications Ordered This Visit   Medications    ARIPiprazole (ABILIFY) 5 mg tablet    QUEtiapine (SEROquel) 50 mg tablet     Sig: Take 100 mg by mouth daily at bedtime    lidocaine (Lidoderm) 5 %     Sig: Apply 1 patch topically daily Remove & Discard patch within 12 hours or as directed by MD     Dispense:  15 patch     Refill:  1    meloxicam (MOBIC) 15 mg tablet     Sig: Take 1 tablet (15 mg total) by mouth daily as needed for moderate pain     Dispense:  30 tablet     Refill:  1       My impressions and treatment recommendations were discussed in detail with the patient who verbalized understanding and had no further questions  This is a 69-year-old female who returns to our office with continued bilateral low back pain and bilateral lower extremity sciatica  Was having notable relief with meloxicam 15 p r n  and Lidoderm patch  Given improvement with these medications, reordered both medications today  Advised to take meloxicam only as needed and to stop ibuprofen when taking the medication  She would also benefit from physical therapy  We will have her return in 4 months for medication follow-up  If she has no significant relief with symptoms at that time, the may consider getting MRI of the lumbar spine  Patient notably previously declined injections in the past       Follow-up is planned in 4m time or sooner as warranted  Discharge instructions were provided   I personally saw and examined the patient and I agree with the above discussed plan of care  History of Present Illness:    Love Nguyen is a 42 y o  female who presents to [facility] for interval re-evaluation of the above stated pain complaints  The patient has a past medical and chronic pain history as outlined in the assessment section  She was last seen on 07/08/2021 via telemedicine in regards to chronic bilateral low back pain with right sided sciatica  Returns today with worsening pain 9/10 and worse in the morning evening  Pain is intermittent, throbbing, shooting, numbness in the left lower extremity, pins and needles in the right lower extremity  Current medications include Lidoderm patch which did provide relief  She was also using meloxicam 50 mg daily as needed as well which provided relief  Patient was notably doing better since last time she saw us, but has had recent worsening of her symptoms  Other than as stated above, the patient denies any interval changes in medications, medical condition, mental condition, symptoms, or allergies since the last office visit  Review of Systems:    Review of Systems   Respiratory: Negative for shortness of breath  Cardiovascular: Negative for chest pain  Gastrointestinal: Negative for constipation, diarrhea, nausea and vomiting  Musculoskeletal: Positive for gait problem  Negative for arthralgias, joint swelling and myalgias  Decreased ROM  Joint Stiffness     Skin: Negative for rash  Neurological: Negative for dizziness, seizures and weakness  All other systems reviewed and are negative  There is no problem list on file for this patient        Past Medical History:   Diagnosis Date    [facility] insufficiency ([facility] [place] [address] )     Disease of thyroid gland     hypo    Disorder of bone density and structure, unspecified     Epilepsy ([facility])     Hyperlipidemia     Hypopituitarism ([facility] [place] [address] )     Mild intellectual disabilities     Psychiatric disorder     bipolar       History reviewed  No pertinent surgical history  History reviewed  No pertinent family history      Social History     Occupational History    Not on file   Tobacco Use    Smoking status: Never Smoker    Smokeless tobacco: Never Used   Vaping Use    Vaping Use: Never used   Substance and Sexual Activity    Alcohol use: Not Currently    Drug use: Not Currently    Sexual activity: Not on file         Current Outpatient Medications:     acetaminophen (TYLENOL) 325 mg tablet, Take 650 mg by mouth every 6 (six) hours as needed for mild pain, Disp: , Rfl:     albuterol (PROVENTIL HFA,VENTOLIN HFA) 90 mcg/act inhaler, Inhale 2 puffs every 6 (six) hours as needed for wheezing, Disp: , Rfl:     ARIPiprazole (ABILIFY) 5 mg tablet, , Disp: , Rfl:     Calcium Carbonate-Vitamin D (CALCIUM 500 + D) 500-125 MG-UNIT TABS, Take 1 tablet by mouth 2 (two) times a day, Disp: , Rfl:     Cholecalciferol (VITAMIN D) 50 MCG (2000 UT) tablet, Take 2,000 Units by mouth daily, Disp: , Rfl:     clonazePAM (KlonoPIN) 0 5 mg tablet, Take 0 5 mg by mouth daily, Disp: , Rfl:     clonazePAM (KlonoPIN) 1 mg tablet, Take 1 mg by mouth 2 (two) times a day, Disp: , Rfl:     divalproex sodium (DEPAKOTE ER) 500 mg 24 hr tablet, Take 1,000 mg by mouth daily at bedtime, Disp: , Rfl:     docusate sodium (COLACE) 100 mg capsule, Take 100 mg by mouth daily, Disp: , Rfl:     furosemide (LASIX) 20 mg tablet, Take 20 mg by mouth daily, Disp: , Rfl:     Glycerin-Polysorbate 80 (REFRESH DRY EYE THERAPY OP), Apply 1 application to eye 2 (two) times a day, Disp: , Rfl:     hydrocortisone (CORTEF) 5 mg tablet, Take 15 mg by mouth daily with breakfast, Disp: , Rfl:     HYDROCORTISONE PO, Take 5 mg by mouth daily, Disp: , Rfl:     ibuprofen (MOTRIN) 200 mg tablet, Take 200 mg by mouth every 6 (six) hours as needed for mild pain, Disp: , Rfl:     Levothyroxine Sodium (SYNTHROID PO), Take 224 mcg by mouth daily, Disp: , Rfl:     Levothyroxine Sodium (SYNTHROID PO), Take 336 mcg by mouth, Disp: , Rfl:     meclizine (ANTIVERT) 25 mg tablet, Take 25 mg by mouth every 12 (twelve) hours as needed for dizziness, Disp: , Rfl:     Multiple Vitamin (MULTIVITAMIN) tablet, Take 1 tablet by mouth daily, Disp: , Rfl:     mupirocin (BACTROBAN) 2 % ointment, Apply 1 application topically 3 (three) times a day, Disp: , Rfl:     Omega-3 Fatty Acids (FISH OIL) 1,000 mg, Take 1,000 mg by mouth 3 (three) times a day, Disp: , Rfl:     QUEtiapine (SEROquel) 50 mg tablet, Take 100 mg by mouth daily at bedtime, Disp: , Rfl:     sertraline (ZOLOFT) 100 mg tablet, Take 100 mg by mouth daily, Disp: , Rfl:     sertraline (ZOLOFT) 50 mg tablet, Take 50 mg by mouth daily at bedtime, Disp: , Rfl:     triamcinolone (KENALOG) 0 1 % cream, Apply topically 2 (two) times a day as needed (ezcema), Disp: , Rfl:     lidocaine (Lidoderm) 5 %, Apply 1 patch topically daily Remove & Discard patch within 12 hours or as directed by MD, Disp: 15 patch, Rfl: 1    meloxicam (MOBIC) 15 mg tablet, Take 1 tablet (15 mg total) by mouth daily as needed for moderate pain, Disp: 30 tablet, Rfl: 1    Allergies   Allergen Reactions    Haldol [Haloperidol] Other (See Comments)     dyskenesia    Thioridazine        Physical Exam:    /87   Pulse 84   Resp 18   Ht 5' 8" (1 727 m)   Wt 122 kg (268 lb)   BMI 40 75 kg/m²     Constitutional:normal, well developed, well nourished, alert, in no distress and non-toxic and no overt pain behavior    Eyes:anicteric  HEENT:grossly intact  Neck:supple, symmetric, trachea midline and no masses   Pulmonary:even and unlabored  Cardiovascular:No edema or pitting edema present  Skin:Normal without rashes or lesions and well hydrated  Psychiatric:Mood and affect appropriate  Neurologic:Cranial Nerves II-XII grossly intact  Musculoskeletal:normal      Imaging  No orders to display         Orders Placed This Encounter   Procedures    Ambulatory referral to Physical Therapy

## 2022-03-21 ENCOUNTER — OFFICE VISIT (OUTPATIENT)
Dept: PAIN MEDICINE | Facility: CLINIC | Age: 42
End: 2022-03-21
Payer: MEDICARE

## 2022-03-21 VITALS
RESPIRATION RATE: 18 BRPM | DIASTOLIC BLOOD PRESSURE: 87 MMHG | SYSTOLIC BLOOD PRESSURE: 122 MMHG | HEART RATE: 84 BPM | HEIGHT: 68 IN | BODY MASS INDEX: 40.62 KG/M2 | WEIGHT: 268 LBS

## 2022-03-21 DIAGNOSIS — G89.29 CHRONIC BILATERAL LOW BACK PAIN WITH BILATERAL SCIATICA: ICD-10-CM

## 2022-03-21 DIAGNOSIS — M54.41 CHRONIC BILATERAL LOW BACK PAIN WITH BILATERAL SCIATICA: ICD-10-CM

## 2022-03-21 DIAGNOSIS — M54.42 CHRONIC BILATERAL LOW BACK PAIN WITH BILATERAL SCIATICA: ICD-10-CM

## 2022-03-21 PROCEDURE — 99214 OFFICE O/P EST MOD 30 MIN: CPT | Performed by: STUDENT IN AN ORGANIZED HEALTH CARE EDUCATION/TRAINING PROGRAM

## 2022-03-21 RX ORDER — ARIPIPRAZOLE 5 MG/1
TABLET ORAL
COMMUNITY
Start: 2022-03-17

## 2022-03-21 RX ORDER — LIDOCAINE 50 MG/G
1 PATCH TOPICAL DAILY
Qty: 15 PATCH | Refills: 1 | Status: SHIPPED | OUTPATIENT
Start: 2022-03-21 | End: 2022-07-01 | Stop reason: SDUPTHER

## 2022-03-21 RX ORDER — QUETIAPINE FUMARATE 50 MG/1
100 TABLET, FILM COATED ORAL
COMMUNITY

## 2022-03-21 RX ORDER — MELOXICAM 15 MG/1
15 TABLET ORAL DAILY PRN
Qty: 30 TABLET | Refills: 1 | Status: SHIPPED | OUTPATIENT
Start: 2022-03-21

## 2022-04-22 ENCOUNTER — HOSPITAL ENCOUNTER (EMERGENCY)
Facility: HOSPITAL | Age: 42
Discharge: HOME | End: 2022-04-22
Attending: EMERGENCY MEDICINE
Payer: MEDICARE

## 2022-04-22 VITALS
TEMPERATURE: 98.6 F | HEART RATE: 99 BPM | RESPIRATION RATE: 19 BRPM | OXYGEN SATURATION: 99 % | SYSTOLIC BLOOD PRESSURE: 152 MMHG | DIASTOLIC BLOOD PRESSURE: 85 MMHG
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
## 2022-04-22 DIAGNOSIS — Z00.00 WELL ADULT EXAM: Primary | ICD-10-CM

## 2022-04-22 PROCEDURE — 99283 EMERGENCY DEPT VISIT LOW MDM: CPT

## 2022-04-22 PROCEDURE — 99281 EMR DPT VST MAYX REQ PHY/QHP: CPT | Performed by: PHYSICIAN ASSISTANT
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
## 2022-07-01 ENCOUNTER — TELEPHONE (OUTPATIENT)
Dept: PAIN MEDICINE | Facility: CLINIC | Age: 42
End: 2022-07-01

## 2022-07-01 DIAGNOSIS — M54.42 CHRONIC BILATERAL LOW BACK PAIN WITH BILATERAL SCIATICA: ICD-10-CM

## 2022-07-01 DIAGNOSIS — M54.41 CHRONIC BILATERAL LOW BACK PAIN WITH BILATERAL SCIATICA: ICD-10-CM

## 2022-07-01 DIAGNOSIS — G89.29 CHRONIC BILATERAL LOW BACK PAIN WITH BILATERAL SCIATICA: ICD-10-CM

## 2022-07-01 RX ORDER — LIDOCAINE 50 MG/G
1 PATCH TOPICAL DAILY
Qty: 15 PATCH | Refills: 1 | Status: SHIPPED | OUTPATIENT
Start: 2022-07-01 | End: 2022-08-09 | Stop reason: SDUPTHER

## 2022-07-01 NOTE — TELEPHONE ENCOUNTER
Call from [facility] nursing office  Ph# [phone] (until 4pm)  Patient lives in a group home    Refill Lidocaine 5% patch  Apply 1 patch topically daily  Remaining: enough until Monday night  [address] [facility] [place] -

## 2022-07-21 ENCOUNTER — TELEPHONE (OUTPATIENT)
Dept: PAIN MEDICINE | Facility: CLINIC | Age: 42
End: 2022-07-21

## 2022-07-21 NOTE — TELEPHONE ENCOUNTER
Call from Cassie from [facility]# [phone]    Caller states that patient was exposed to a staff at their group home who tested positive for covid  Last exposure was yesterday morning  Rapid pcr test resulted negative 24 hours after exposure, patient must raf OV per provider  Patient requires covid test 5 days after exposure  Caller aware

## 2022-08-09 ENCOUNTER — TELEPHONE (OUTPATIENT)
Dept: PAIN MEDICINE | Facility: CLINIC | Age: 42
End: 2022-08-09

## 2022-08-09 DIAGNOSIS — G89.29 CHRONIC BILATERAL LOW BACK PAIN WITH BILATERAL SCIATICA: ICD-10-CM

## 2022-08-09 DIAGNOSIS — M54.42 CHRONIC BILATERAL LOW BACK PAIN WITH BILATERAL SCIATICA: ICD-10-CM

## 2022-08-09 DIAGNOSIS — M54.41 CHRONIC BILATERAL LOW BACK PAIN WITH BILATERAL SCIATICA: ICD-10-CM

## 2022-08-09 RX ORDER — LIDOCAINE 50 MG/G
1 PATCH TOPICAL DAILY
Qty: 15 PATCH | Refills: 1 | Status: SHIPPED | OUTPATIENT
Start: 2022-08-09 | End: 2022-12-07 | Stop reason: SDUPTHER

## 2022-08-09 NOTE — TELEPHONE ENCOUNTER
[facility] [phone]    Refill request  Lidocaine 5% patch  Apply 1 patch topically daily Remove & Discard patch within 12 hours or as directed by MD  Remainin patches  [facility] - [facility] - [address] [place] [address]

## 2022-08-29 ENCOUNTER — OFFICE VISIT (OUTPATIENT)
Dept: PAIN MEDICINE | Facility: CLINIC | Age: 42
End: 2022-08-29
Payer: MEDICARE

## 2022-08-29 VITALS — BODY MASS INDEX: 39.56 KG/M2 | WEIGHT: 261 LBS | HEIGHT: 68 IN

## 2022-08-29 DIAGNOSIS — M54.16 LUMBAR RADICULOPATHY: ICD-10-CM

## 2022-08-29 DIAGNOSIS — G89.29 CHRONIC BILATERAL LOW BACK PAIN WITH RIGHT-SIDED SCIATICA: Primary | ICD-10-CM

## 2022-08-29 DIAGNOSIS — M54.41 CHRONIC BILATERAL LOW BACK PAIN WITH RIGHT-SIDED SCIATICA: Primary | ICD-10-CM

## 2022-08-29 PROCEDURE — 99214 OFFICE O/P EST MOD 30 MIN: CPT | Performed by: STUDENT IN AN ORGANIZED HEALTH CARE EDUCATION/TRAINING PROGRAM

## 2022-08-29 NOTE — PATIENT INSTRUCTIONS
Epidural Steroid Injection   AMBULATORY CARE:   What you need to know about an epidural steroid injection (PEARL):  An PEARL is a procedure to inject steroid medicine into the epidural space  The epidural space is between your spinal cord and vertebrae  Steroids reduce inflammation and fluid buildup in your spine that may be causing pain  You may be given pain medicine along with the steroids  How to prepare for an PEARL:  Your healthcare provider will talk to you about how to prepare for your procedure  He or she will tell you what medicines to take or not take on the day of your procedure  You may need to stop taking blood thinners or other medicines several days before your procedure  You may need to adjust any diabetes medicine you take on the day of your procedure  Steroid medicine can increase your blood sugar level  Arrange for someone to drive you home when you are discharged  What will happen during an PEARL:   You will be given medicine to numb the procedure area  You will be awake for the procedure, but you will not feel pain  You may also be given medicine to help you relax  Contrast liquid will be used to help your healthcare provider see the area better  Tell the healthcare provider if you have ever had an allergic reaction to contrast liquid  Your healthcare provider may place the needle into your neck area, middle of your back, or tailbone area  He may inject the medicine next to the nerves that are causing your pain  He may instead inject the medicine into a larger area of the epidural space  This helps the medicine spread to more nerves  Your healthcare provider will use a fluoroscope to help guide the needle to the right place  A fluoroscope is a type of x-ray  After the procedure, a bandage will be placed over the injection site to prevent infection  What will happen after an PEARL:  You will have a bandage over the injection site to prevent infection   Your healthcare provider will tell you when you can bathe and any activity guidelines  You will be able to go home  Risks of an PEARL:  You may have temporary or permanent nerve damage or paralysis  You may have bleeding or develop a serious infection, such as meningitis (swelling of the brain coverings)  An abscess may also develop  An abscess is a pus-filled area under the skin  You may need surgery to fix the abscess  You may have a seizure, anxiety, or trouble sleeping  If you are a man, you may have temporary erectile dysfunction (not able to have an erection)  Call your local emergency number (911 in the [address] [place] [address],[address]) if:   You have a seizure  You have trouble moving your legs  Seek care immediately if:   Blood soaks through your bandage  You have a fever or chills, severe back pain, and the procedure area is sensitive to the touch  You cannot control when you urinate or have a bowel movement  Call your doctor if:   You have weakness or numbness in your legs  Your wound is red, swollen, or draining pus  You have nausea or are vomiting  Your face or neck is red and you feel warm  You have more pain than you had before the procedure  You have swelling in your hands or feet  You have questions or concerns about your condition or care  Care for your wound as directed: You may remove the bandage before you go to bed the day of your procedure  You may take a shower, but do not take a bath for at least 24 hours  Self-care:   Do not drive,  use machines, or do strenuous activity for 24 hours after your procedure or as directed  Continue other treatments  as directed  Steroid injections alone will not control your pain  The injections are meant to be used with other treatments, such as physical therapy  Follow up with your doctor as directed:  Write down your questions so you remember to ask them during your visits     © Copyright [employer] 2022 Information is for End User's use only and may not be sold, redistributed or otherwise used for commercial purposes  All illustrations and images included in CareNotes® are the copyrighted property of A D A M , Inc  or Sharlene Hart  The above information is an  only  It is not intended as medical advice for individual conditions or treatments  Talk to your doctor, nurse or pharmacist before following any medical regimen to see if it is safe and effective for you

## 2022-08-29 NOTE — PROGRESS NOTES
Pain Medicine Follow-Up Note    Assessment:  1  Chronic bilateral low back pain with right-sided sciatica    2  Lumbar radiculopathy        Plan:  Orders Placed This Encounter   Procedures    MRI lumbar spine without contrast     Standing Status:   Future     Standing Expiration Date:   8/29/2026     Scheduling Instructions: There is no preparation for this test  Please leave your jewelry and valuables at home, wedding rings are the exception  All patients will be required to change into a hospital gown and pants  Street clothes are not permitted in the MRI  Magnetic nail polish must be removed prior to arrival for your test  Please bring your insurance cards, a form of photo ID and a list of your medications with you  Arrive 15 minutes prior to your appointment time in order to register  Please bring any prior CT or MRI studies of this area that were not performed at a [facility]  To schedule this appointment, please contact Central Scheduling at [phone]  Prior to your appointment, please make sure you complete the MRI Screening Form when you e-Check in for your appointment  This will be available starting 7 days before your appointment in [address]  You may receive an e-mail with an activation code if you do not have a [employer] account  If you do not have access to a device, we will complete your screening at your appointment  Order Specific Question:   What is the patient's sedation requirement? Answer:   No Sedation     Order Specific Question:   Is the patient pregnant? Answer:   Unknown     Order Specific Question:   Release to patient through [employer]     Answer:   Immediate     Order Specific Question:   Is order priority selected as STAT?      Answer:   No     Order Specific Question:   Reason for Exam (FREE TEXT)     Answer:   low back pain with right lower extremity radiculopathy, >6 weeks conservative therapy       No orders of the defined types were placed in this encounter  My impressions and treatment recommendations were discussed in detail with the patient who verbalized understanding and had no further questions  This is a 58-year-old female who returns to our office with continued bilateral low back pain with bilateral sciatica  She continues to have improvement with Lidoderm patch and meloxicam 15 mg daily p r n Dara Soulier She has also undergone course of physical therapy for at least 6weeks with some improvement in her pain and function  Despite clinical improvement, she still  Has notable pain primarily down the right lower extremity that limits her function at times  Given suspicion for lumbar radiculopathy, will order MRI of the lumbar spine to assess for compressive pathology which will help to determine next course of action which may include lumbar epidural steroid injection which was discussed with the patient in detail today  Patient's mother notably gives consent for the patient and we discussed that on day of procedure, would likely need to get verbal consent over the phone as she is patient's legal guardian  [address] [facility] Prescription Drug Monitoring Program report was reviewed and was appropriate     Complete risks and benefits including bleeding, infection, tissue reaction, nerve injury and allergic reaction were discussed  The approach was demonstrated using models and literature was provided  Verbal and written consent was obtained  Discharge instructions were provided  I personally saw and examined the patient and I agree with the above discussed plan of care  History of Present Illness:    Isatu Goncalves is a 36 y o  female who presents to [facility] for interval re-evaluation of the above stated pain complaints  The patient has a past medical and chronic pain history as outlined in the assessment section   She was last seen on 03/21/2022 IN REGARDS TO CHRONIC BILATERAL LOW BACK PAIN WITH BILATERAL SCIATICA  She was given referral to physical therapy and has completed PT and was discharged  She returns today with improvement in her symptoms  Pain is worse in morning, evening, nighttime  Pain is intermittent, sharp, shooting in nature  It is mostly in the right buttock area radiating to the posterior thigh  Current medications include Lidoderm patch and meloxicam 15 mg daily p r n  Vesna Valente Denies any significant side effects from medications  Vesna Valente ther than as stated above, the patient denies any interval changes in medications, medical condition, mental condition, symptoms, or allergies since the last office visit  Review of Systems:    Review of Systems   Respiratory: Negative for shortness of breath  Cardiovascular: Negative for chest pain  Gastrointestinal: Negative for constipation, diarrhea, nausea and vomiting  Musculoskeletal: Positive for gait problem  Negative for arthralgias, joint swelling and myalgias  Pain in right lower extremity   Skin: Negative for rash  Neurological: Negative for dizziness, seizures and weakness  All other systems reviewed and are negative  There is no problem list on file for this patient  Past Medical History:   Diagnosis Date    Adrenoc[facility] insufficiency ([facility] )     Disease of thyroid gland     hypo    Disorder of bone density and structure, unspecified     Epilepsy ([facility])     Hyperlipidemia     [facility]     Mild intellectual disabilities     Psychiatric disorder     bipolar       History reviewed  No pertinent surgical history  History reviewed  No pertinent family history      Social History     Occupational History    Not on file   Tobacco Use    Smoking status: Never Smoker    Smokeless tobacco: Never Used   Vaping Use    Vaping Use: Never used   Substance and Sexual Activity    Alcohol use: Not Currently    Drug use: Not Currently    Sexual activity: Not on file         Current Outpatient Medications:   ARIPiprazole (ABILIFY) 5 mg tablet, , Disp: , Rfl:     Cholecalciferol (VITAMIN D) 50 MCG (2000 UT) tablet, Take 2,000 Units by mouth daily, Disp: , Rfl:     furosemide (LASIX) 20 mg tablet, Take 20 mg by mouth daily, Disp: , Rfl:     hydrocortisone (CORTEF) 5 mg tablet, Take 15 mg by mouth daily with breakfast, Disp: , Rfl:     ibuprofen (MOTRIN) 200 mg tablet, Take 200 mg by mouth every 6 (six) hours as needed for mild pain, Disp: , Rfl:     Levothyroxine Sodium (SYNTHROID PO), Take 336 mcg by mouth, Disp: , Rfl:     lidocaine (Lidoderm) 5 %, Apply 1 patch topically daily Remove & Discard patch within 12 hours or as directed by MD, Disp: 15 patch, Rfl: 1    meclizine (ANTIVERT) 25 mg tablet, Take 25 mg by mouth every 12 (twelve) hours as needed for dizziness, Disp: , Rfl:     meloxicam (MOBIC) 15 mg tablet, Take 1 tablet (15 mg total) by mouth daily as needed for moderate pain, Disp: 30 tablet, Rfl: 1    mupirocin (BACTROBAN) 2 % ointment, Apply 1 application topically 3 (three) times a day, Disp: , Rfl:     Omega-3 Fatty Acids (FISH OIL) 1,000 mg, Take 1,000 mg by mouth 3 (three) times a day, Disp: , Rfl:     QUEtiapine (SEROquel) 50 mg tablet, Take 100 mg by mouth daily at bedtime, Disp: , Rfl:     sertraline (ZOLOFT) 100 mg tablet, Take 100 mg by mouth daily, Disp: , Rfl:     sertraline (ZOLOFT) 50 mg tablet, Take 50 mg by mouth daily at bedtime, Disp: , Rfl:     acetaminophen (TYLENOL) 325 mg tablet, Take 650 mg by mouth every 6 (six) hours as needed for mild pain (Patient not taking: Reported on 8/29/2022), Disp: , Rfl:     albuterol (PROVENTIL HFA,VENTOLIN HFA) 90 mcg/act inhaler, Inhale 2 puffs every 6 (six) hours as needed for wheezing (Patient not taking: Reported on 8/29/2022), Disp: , Rfl:     Calcium Carbonate-Vitamin D 500-125 MG-UNIT TABS, Take 1 tablet by mouth 2 (two) times a day (Patient not taking: Reported on 8/29/2022), Disp: , Rfl:     clonazePAM (KlonoPIN) 0 5 mg tablet, Take 0 5 mg by mouth daily (Patient not taking: Reported on 8/29/2022), Disp: , Rfl:     clonazePAM (KlonoPIN) 1 mg tablet, Take 1 mg by mouth 2 (two) times a day (Patient not taking: Reported on 8/29/2022), Disp: , Rfl:     divalproex sodium (DEPAKOTE ER) 500 mg 24 hr tablet, Take 1,000 mg by mouth daily at bedtime (Patient not taking: Reported on 8/29/2022), Disp: , Rfl:     docusate sodium (COLACE) 100 mg capsule, Take 100 mg by mouth daily (Patient not taking: Reported on 8/29/2022), Disp: , Rfl:     Glycerin-Polysorbate 80 (REFRESH DRY EYE THERAPY OP), Apply 1 application to eye 2 (two) times a day (Patient not taking: Reported on 8/29/2022), Disp: , Rfl:     HYDROCORTISONE PO, Take 5 mg by mouth daily (Patient not taking: Reported on 8/29/2022), Disp: , Rfl:     Levothyroxine Sodium (SYNTHROID PO), Take 224 mcg by mouth daily (Patient not taking: Reported on 8/29/2022), Disp: , Rfl:     Multiple Vitamin (MULTIVITAMIN) tablet, Take 1 tablet by mouth daily (Patient not taking: Reported on 8/29/2022), Disp: , Rfl:     triamcinolone (KENALOG) 0 1 % cream, Apply topically 2 (two) times a day as needed (ezcema) (Patient not taking: Reported on 8/29/2022), Disp: , Rfl:     Allergies   Allergen Reactions    Haldol [Haloperidol] Other (See Comments)     dyskenesia    Thioridazine        Physical Exam:    Ht 5' 8" (1 727 m)   Wt 118 kg (261 lb)   BMI 39 68 kg/m²     Constitutional:normal, well developed, well nourished, alert, in no distress and non-toxic and no overt pain behavior    Eyes:anicteric  HEENT:grossly intact  Neck:supple, symmetric, trachea midline and no masses   Pulmonary:even and unlabored  Cardiovascular:No edema or pitting edema present  Skin:Normal without rashes or lesions and well hydrated  Psychiatric:Mood and affect appropriate  Neurologic:Cranial Nerves II-XII grossly intact  Musculoskeletal:normal      Imaging  MRI lumbar spine without contrast    (Results Pending)         Orders Placed This Encounter   Procedures    MRI lumbar spine without contrast

## 2022-09-27 ENCOUNTER — HOSPITAL ENCOUNTER (OUTPATIENT)
Dept: MRI IMAGING | Facility: HOSPITAL | Age: 42
Discharge: HOME | End: 2022-09-27
Attending: STUDENT IN AN ORGANIZED HEALTH CARE EDUCATION/TRAINING PROGRAM
Payer: MEDICARE

## 2022-09-27 DIAGNOSIS — G89.29 CHRONIC BILATERAL LOW BACK PAIN WITH RIGHT-SIDED SCIATICA: ICD-10-CM

## 2022-09-27 DIAGNOSIS — M54.41 CHRONIC BILATERAL LOW BACK PAIN WITH RIGHT-SIDED SCIATICA: ICD-10-CM

## 2022-09-27 DIAGNOSIS — M54.16 LUMBAR RADICULOPATHY: ICD-10-CM

## 2022-09-27 PROCEDURE — 72148 MRI LUMBAR SPINE W/O DYE: CPT

## 2022-09-30 ENCOUNTER — TELEPHONE (OUTPATIENT)
Dept: RADIOLOGY | Facility: CLINIC | Age: 42
End: 2022-09-30

## 2022-09-30 NOTE — TELEPHONE ENCOUNTER
----- Message from Leia Mcmahon MD sent at 9/30/2022  1:58 PM EDT -----  Patient's MRI shows disc bulging at multiple levels  She has some mild narrowing of the spinal canal at her L5-S1 area  Would not say that she has severe nerve impingement  She also has some degenerative changes in the joints in her back which may contribute to back pain

## 2022-10-03 NOTE — TELEPHONE ENCOUNTER
S/w nurse at [facility]  States pt has not been complaining of any significant pain lately and is using lidocaine patches daily  Advised of MRI results  Merlyn Thomas states a possible injection was discussed at [facility] and is asking if there is a recommendation at this time for an injection   Advised if an injection is recommended [facility] will cb, otherwise pt is to f/u as scheduled  Merlyn Thomas verbalized understanding

## 2022-10-03 NOTE — TELEPHONE ENCOUNTER
Lm for nurse to call back for  MRI results  Please try to reach triage when nurse calls back  Pt resides at [facility]

## 2022-10-03 NOTE — TELEPHONE ENCOUNTER
Richie Pittman from [facility] care home is returning nurses call   Please reach out to her at # [phone], thx

## 2022-11-03 ENCOUNTER — DOCUMENTATION (OUTPATIENT)
Dept: PAIN MEDICINE | Facility: CLINIC | Age: 42
End: 2022-11-03

## 2022-11-03 NOTE — PROGRESS NOTES
Spoke to Arthur in nursing office at [facility]  Arthur was made aware of new appt

## 2022-12-05 NOTE — PROGRESS NOTES
Pain Medicine Follow-Up Note    Assessment:  1  Chronic bilateral low back pain with right-sided sciatica    2  Lumbar radiculopathy    3  Lumbar facet arthropathy    4  Chronic bilateral low back pain with bilateral sciatica        Plan:  No orders of the defined types were placed in this encounter  New Medications Ordered This Visit   Medications   • lidocaine (Lidoderm) 5 %     Sig: Apply 1 patch topically daily Remove & Discard patch within 12 hours or as directed by MD     Dispense:  15 patch     Refill:  3   • meloxicam (MOBIC) 15 mg tablet     Sig: Take 1 tablet (15 mg total) by mouth daily as needed for moderate pain     Dispense:  30 tablet     Refill:  2       My impressions and treatment recommendations were discussed in detail with the patient who verbalized understanding and had no further questions  50-year-old female who returns her office with again complaints of notable low continues with low back and right lower extremity radicular symptoms that are well controlled with lidocaine patch and meloxicam 15 mg daily     Denies any significant side effects of medication  Given ongoing improvement, will continue medications as is without any changes at this time  She is also declining epidural injection at this time and given her ongoing pain relief, this is reasonable  We will see her back in 6 months or sooner medically necessary  I also discussed patient's MRI findings September 2022  Has multilevel disc bulging but no significant stenosis centrally or foraminally  [place] Prescription Drug Monitoring Program report was reviewed and was appropriate     Complete risks and benefits including bleeding, infection, tissue reaction, nerve injury and allergic reaction were discussed  The approach was demonstrated using models and literature was provided  Verbal and written consent was obtained  Discharge instructions were provided   I personally saw and examined the patient and I agree with the above discussed plan of care  History of Present Illness:    Jovita Ayala is a 36 y o  female who presents to [facility] for interval re-evaluation of the above stated pain complaints  The patient has a past medical and chronic pain history as outlined in the assessment section  She was last seen on 8/29/2022 in regards to low back pain with right lower extremity radicular symptoms  Since last visit, underwent MRI of the lumbar spine bulging at multiple levels  No significant central or foraminal stenosis  Medications include meloxicam 15 mg daily as needed and lidocaine patch  In the morning  Pain is occasional, sharp, shooting in nature  No Significant changes in her medical history since last visit  Other than as stated above, the patient denies any interval changes in medications, medical condition, mental condition, symptoms, or allergies since the last office visit  Review of Systems:    Review of Systems      Past Medical History:   Diagnosis Date   • Adrenocortical insufficiency ([facility] )    • Disease of thyroid gland     hypo   • Disorder of bone density and structure, unspecified    • Epilepsy ([facility] )    • Hyperlipidemia    • Hypopituitarism ([facility] )    • Mild intellectual disabilities    • Psychiatric disorder     bipolar       History reviewed  No pertinent surgical history  History reviewed  No pertinent family history      Social History     Occupational History   • Not on file   Tobacco Use   • Smoking status: Never   • Smokeless tobacco: Never   Vaping Use   • Vaping Use: Never used   Substance and Sexual Activity   • Alcohol use: Not Currently   • Drug use: Not Currently   • Sexual activity: Not on file         Current Outpatient Medications:   •  acetaminophen (TYLENOL) 325 mg tablet, Take 650 mg by mouth every 6 (six) hours as needed for mild pain, Disp: , Rfl:   •  albuterol (PROVENTIL HFA,VENTOLIN HFA) 90 mcg/act inhaler, Inhale 2 puffs every 6 (six) hours as needed for wheezing, Disp: , Rfl:   •  ARIPiprazole (ABILIFY) 5 mg tablet, , Disp: , Rfl:   •  Calcium Carbonate-Vitamin D 500-125 MG-UNIT TABS, Take 1 tablet by mouth 2 (two) times a day, Disp: , Rfl:   •  Cholecalciferol (VITAMIN D) 50 MCG (2000 UT) tablet, Take 2,000 Units by mouth daily, Disp: , Rfl:   •  clonazePAM (KlonoPIN) 0 5 mg tablet, Take 0 5 mg by mouth daily, Disp: , Rfl:   •  clonazePAM (KlonoPIN) 1 mg tablet, Take 1 mg by mouth 2 (two) times a day, Disp: , Rfl:   •  divalproex sodium (DEPAKOTE ER) 500 mg 24 hr tablet, Take 1,000 mg by mouth daily at bedtime, Disp: , Rfl:   •  docusate sodium (COLACE) 100 mg capsule, Take 100 mg by mouth daily, Disp: , Rfl:   •  furosemide (LASIX) 20 mg tablet, Take 20 mg by mouth daily, Disp: , Rfl:   •  Glycerin-Polysorbate 80 (REFRESH DRY EYE THERAPY OP), Apply 1 application to eye 2 (two) times a day, Disp: , Rfl:   •  hydrocortisone (CORTEF) 5 mg tablet, Take 15 mg by mouth daily with breakfast, Disp: , Rfl:   •  ibuprofen (MOTRIN) 200 mg tablet, Take 200 mg by mouth every 6 (six) hours as needed for mild pain, Disp: , Rfl:   •  Levothyroxine Sodium (SYNTHROID PO), Take 336 mcg by mouth, Disp: , Rfl:   •  lidocaine (Lidoderm) 5 %, Apply 1 patch topically daily Remove & Discard patch within 12 hours or as directed by MD, Disp: 15 patch, Rfl: 3  •  meclizine (ANTIVERT) 25 mg tablet, Take 25 mg by mouth every 12 (twelve) hours as needed for dizziness, Disp: , Rfl:   •  meloxicam (MOBIC) 15 mg tablet, Take 1 tablet (15 mg total) by mouth daily as needed for moderate pain, Disp: 30 tablet, Rfl: 2  •  Multiple Vitamin (MULTIVITAMIN) tablet, Take 1 tablet by mouth daily, Disp: , Rfl:   •  mupirocin (BACTROBAN) 2 % ointment, Apply 1 application topically 3 (three) times a day, Disp: , Rfl:   •  Omega-3 Fatty Acids (FISH OIL) 1,000 mg, Take 1,000 mg by mouth 3 (three) times a day, Disp: , Rfl:   •  QUEtiapine (SEROquel) 50 mg tablet, Take 100 mg by mouth daily at bedtime, Disp: , Rfl:   •  sertraline (ZOLOFT) 100 mg tablet, Take 100 mg by mouth daily, Disp: , Rfl:   •  sertraline (ZOLOFT) 50 mg tablet, Take 50 mg by mouth daily at bedtime, Disp: , Rfl:   •  triamcinolone (KENALOG) 0 1 % cream, Apply topically 2 (two) times a day as needed (ezcema), Disp: , Rfl:   •  HYDROCORTISONE PO, Take 5 mg by mouth daily (Patient not taking: Reported on 8/29/2022), Disp: , Rfl:   •  Levothyroxine Sodium (SYNTHROID PO), Take 224 mcg by mouth daily (Patient not taking: Reported on 8/29/2022), Disp: , Rfl:     Allergies   Allergen Reactions   • Haldol [Haloperidol] Other (See Comments)     dyskenesia   • Thioridazine        Physical Exam:    /68 (BP Location: Right arm, Patient Position: Sitting, Cuff Size: Standard)   Pulse (!) 132     Constitutional:normal, well developed, well nourished, alert, in no distress and non-toxic and no overt pain behavior  Eyes:anicteric  HEENT:grossly intact  Neck:supple, symmetric, trachea midline and no masses   Pulmonary:even and unlabored  Cardiovascular:No edema or pitting edema present  Skin:Normal without rashes or lesions and well hydrated  Psychiatric:Mood and affect appropriate  Neurologic:Cranial Nerves II-XII grossly intact  Musculoskeletal:normal      Imaging  MRI LUMBAR SPINE WITHOUT CONTRAST  9/27/22     INDICATION: M54 41: Lumbago with sciatica, right side  G89 29: Other chronic pain  M54 16: Radiculopathy, lumbar region  Low back pain radiating down both legs      COMPARISON:  5/17/2021     TECHNIQUE:  Sagittal T1, sagittal T2, sagittal inversion recovery, axial T1 and axial T2, coronal T2     IMAGE QUALITY:  Diagnostic     FINDINGS:     VERTEBRAL BODIES:  There are 5 lumbar type vertebral bodies  Mild levoscoliosis mid lumbar spine centered at the L3 level  Scattered degenerative endplate changes  No focally suspicious marrow lesions  No bone marrow edema or compression abnormality      Normal lordosis      SACRUM:  Scattered degenerative endplate changes  No focally suspicious marrow lesions  No bone marrow edema or compression abnormality      DISTAL CORD AND CONUS:  Normal size and signal within the distal cord and conus  The conus medullaris terminates at the L1 level      PARASPINAL SOFT TISSUES:  Paraspinal soft tissues are unremarkable      LOWER THORACIC DISC SPACES:  Normal disc height and signal   No disc herniation, canal stenosis or foraminal narrowing      LUMBAR DISC SPACES:     L1-L2:  There is no focal disk herniation, central canal or neural foraminal narrowing  Bilateral facet hypertrophy noted      L2-L3:  There is no focal disk herniation, central canal or neural foraminal narrowing  Bilateral facet hypertrophy noted      L3-L4:  There is a diffuse disk bulge  No significant central canal or neural foraminal narrowing  Bilateral facet hypertrophy noted      L4-L5:  There is a diffuse disk bulge  No significant central canal or neural foraminal narrowing  Bilateral facet hypertrophy noted      L5-S1:  There is a diffuse disc bulge  There is bilateral facet hypertrophy  Mild tricompartmental narrowing      IMPRESSION:     Scattered degenerative endplate changes  No focally suspicious marrow lesions  No bone marrow edema or compression abnormality         Workstation performed: [id number]       No orders to display         No orders of the defined types were placed in this encounter

## 2022-12-05 NOTE — PROGRESS NOTES
Pain Medicine Follow-Up Note    Assessment:  No diagnosis found  Plan:  No orders of the defined types were placed in this encounter  No orders of the defined types were placed in this encounter  My impressions and treatment recommendations were discussed in detail with the patient who verbalized understanding and had no further questions  ***    {PDMP Statement:98139}    {UDS Statement:66817}    {Opioid Statement:21405}    {Pain Management Procedure Risk Statement:69290}    {Oral Swab Statement:82712}    Follow-up is planned in *** time or sooner as warranted  Discharge instructions were provided  I personally saw and examined the patient and I agree with the above discussed plan of care  History of Present Illness:    Brenda Longoria is a 36 y o  female who presents to [facility] for interval re-evaluation of the above stated pain complaints  The patient has a past medical and chronic pain history as outlined in the assessment section  {He/she (caps):97955} was last seen on ***     ***    Pain Contract Signed:  ***  Last Urine Drug Screen:  ***    Other than as stated above, the patient denies any interval changes in medications, medical condition, mental condition, symptoms, or allergies since the last office visit  Review of Systems:    Review of Systems      Past Medical History:   Diagnosis Date   • Adrenocortical insufficiency ([facility] [address] )    • Disease of thyroid gland     hypo   • Disorder of bone density and structure, unspecified    • Epilepsy ([facility] [address] )    • Hyperlipidemia    • Hypopituitarism ([facility] [address] )    • Mild intellectual disabilities    • Psychiatric disorder     bipolar       No past surgical history on file  No family history on file      Social History     Occupational History   • Not on file   Tobacco Use   • Smoking status: Never   • Smokeless tobacco: Never   Vaping Use   • Vaping Use: Never used   Substance and Sexual Activity   • Alcohol use: Not Currently   • Drug use: Not Currently   • Sexual activity: Not on file         Current Outpatient Medications:   •  acetaminophen (TYLENOL) 325 mg tablet, Take 650 mg by mouth every 6 (six) hours as needed for mild pain (Patient not taking: Reported on 8/29/2022), Disp: , Rfl:   •  albuterol (PROVENTIL HFA,VENTOLIN HFA) 90 mcg/act inhaler, Inhale 2 puffs every 6 (six) hours as needed for wheezing (Patient not taking: Reported on 8/29/2022), Disp: , Rfl:   •  ARIPiprazole (ABILIFY) 5 mg tablet, , Disp: , Rfl:   •  Calcium Carbonate-Vitamin D 500-125 MG-UNIT TABS, Take 1 tablet by mouth 2 (two) times a day (Patient not taking: Reported on 8/29/2022), Disp: , Rfl:   •  Cholecalciferol (VITAMIN D) 50 MCG (2000 UT) tablet, Take 2,000 Units by mouth daily, Disp: , Rfl:   •  clonazePAM (KlonoPIN) 0 5 mg tablet, Take 0 5 mg by mouth daily (Patient not taking: Reported on 8/29/2022), Disp: , Rfl:   •  clonazePAM (KlonoPIN) 1 mg tablet, Take 1 mg by mouth 2 (two) times a day (Patient not taking: Reported on 8/29/2022), Disp: , Rfl:   •  divalproex sodium (DEPAKOTE ER) 500 mg 24 hr tablet, Take 1,000 mg by mouth daily at bedtime (Patient not taking: Reported on 8/29/2022), Disp: , Rfl:   •  docusate sodium (COLACE) 100 mg capsule, Take 100 mg by mouth daily (Patient not taking: Reported on 8/29/2022), Disp: , Rfl:   •  furosemide (LASIX) 20 mg tablet, Take 20 mg by mouth daily, Disp: , Rfl:   •  Glycerin-Polysorbate 80 (REFRESH DRY EYE THERAPY OP), Apply 1 application to eye 2 (two) times a day (Patient not taking: Reported on 8/29/2022), Disp: , Rfl:   •  hydrocortisone (CORTEF) 5 mg tablet, Take 15 mg by mouth daily with breakfast, Disp: , Rfl:   •  HYDROCORTISONE PO, Take 5 mg by mouth daily (Patient not taking: Reported on 8/29/2022), Disp: , Rfl:   •  ibuprofen (MOTRIN) 200 mg tablet, Take 200 mg by mouth every 6 (six) hours as needed for mild pain, Disp: , Rfl:   •  Levothyroxine Sodium (SYNTHROID PO), Take 224 mcg by mouth daily (Patient not taking: Reported on 2022), Disp: , Rfl:   •  Levothyroxine Sodium (SYNTHROID PO), Take 336 mcg by mouth, Disp: , Rfl:   •  lidocaine (Lidoderm) 5 %, Apply 1 patch topically daily Remove & Discard patch within 12 hours or as directed by MD, Disp: 15 patch, Rfl: 1  •  meclizine (ANTIVERT) 25 mg tablet, Take 25 mg by mouth every 12 (twelve) hours as needed for dizziness, Disp: , Rfl:   •  meloxicam (MOBIC) 15 mg tablet, Take 1 tablet (15 mg total) by mouth daily as needed for moderate pain, Disp: 30 tablet, Rfl: 1  •  Multiple Vitamin (MULTIVITAMIN) tablet, Take 1 tablet by mouth daily (Patient not taking: Reported on 2022), Disp: , Rfl:   •  mupirocin (BACTROBAN) 2 % ointment, Apply 1 application topically 3 (three) times a day, Disp: , Rfl:   •  Omega-3 Fatty Acids (FISH OIL) 1,000 mg, Take 1,000 mg by mouth 3 (three) times a day, Disp: , Rfl:   •  QUEtiapine (SEROquel) 50 mg tablet, Take 100 mg by mouth daily at bedtime, Disp: , Rfl:   •  sertraline (ZOLOFT) 100 mg tablet, Take 100 mg by mouth daily, Disp: , Rfl:   •  sertraline (ZOLOFT) 50 mg tablet, Take 50 mg by mouth daily at bedtime, Disp: , Rfl:   •  triamcinolone (KENALOG) 0 1 % cream, Apply topically 2 (two) times a day as needed (ezcema) (Patient not taking: Reported on 2022), Disp: , Rfl:     Allergies   Allergen Reactions   • Haldol [Haloperidol] Other (See Comments)     dyskenesia   • Thioridazine        Physical Exam:    There were no vitals taken for this visit  Constitutional:{General Appearance:27022::"normal, well developed, well nourished, alert, in no distress and non-toxic and no overt pain behavior  "}  Eyes:{Sclera:96519::"anicteric"}  HEENT:{[place]ear[place]::"grossly intact"}  Neck:{Neck:01769::"supple, symmetric, trachea midline and no masses "}  Pulmonary:{Respiratory effort:23351::"even and unlabored"}  Cardiovascular:{Examination of Extremities:98888::"No edema or pitting edema present"}  Skin:{Skin and Subcutaneous tissues:98743::"Normal without rashes or lesions and well hydrated"}  Psychiatric:{Mood and Affect:65156::"Mood and affect appropriate"}  Neurologic:{Cranial Nerves:87201::"Cranial Nerves II-XII grossly intact"}  Musculoskeletal:{[place]:04710::"normal"}      Imaging  MRI LUMBAR SPINE WITHOUT CONTRAST    9/27/2022     INDICATION: M54 41: Lumbago with sciatica, right side  G89 29: Other chronic pain  M54 16: Radiculopathy, lumbar region  Low back pain radiating down both legs      COMPARISON:  5/17/2021     TECHNIQUE:  Sagittal T1, sagittal T2, sagittal inversion recovery, axial T1 and axial T2, coronal T2     IMAGE QUALITY:  Diagnostic     FINDINGS:     VERTEBRAL BODIES:  There are 5 lumbar type vertebral bodies  Mild levoscoliosis mid lumbar spine centered at the L3 level  Scattered degenerative endplate changes  No focally suspicious marrow lesions  No bone marrow edema or compression abnormality  Normal lordosis      SACRUM:  Scattered degenerative endplate changes  No focally suspicious marrow lesions  No bone marrow edema or compression abnormality      DISTAL CORD AND CONUS:  Normal size and signal within the distal cord and conus  The conus medullaris terminates at the L1 level      PARASPINAL SOFT TISSUES:  Paraspinal soft tissues are unremarkable      LOWER THORACIC DISC SPACES:  Normal disc height and signal   No disc herniation, canal stenosis or foraminal narrowing      LUMBAR DISC SPACES:     L1-L2:  There is no focal disk herniation, central canal or neural foraminal narrowing  Bilateral facet hypertrophy noted      L2-L3:  There is no focal disk herniation, central canal or neural foraminal narrowing  Bilateral facet hypertrophy noted      L3-L4:  There is a diffuse disk bulge  No significant central canal or neural foraminal narrowing  Bilateral facet hypertrophy noted      L4-L5:  There is a diffuse disk bulge    No significant central canal or neural foraminal narrowing  Bilateral facet hypertrophy noted      L5-S1:  There is a diffuse disc bulge  There is bilateral facet hypertrophy  Mild tricompartmental narrowing      IMPRESSION:     Scattered degenerative endplate changes  No focally suspicious marrow lesions  No bone marrow edema or compression abnormality         No orders to display         No orders of the defined types were placed in this encounter

## 2022-12-07 ENCOUNTER — OFFICE VISIT (OUTPATIENT)
Dept: PAIN MEDICINE | Facility: CLINIC | Age: 42
End: 2022-12-07

## 2022-12-07 VITALS — SYSTOLIC BLOOD PRESSURE: 102 MMHG | HEART RATE: 132 BPM | DIASTOLIC BLOOD PRESSURE: 68 MMHG

## 2022-12-07 DIAGNOSIS — G89.29 CHRONIC BILATERAL LOW BACK PAIN WITH RIGHT-SIDED SCIATICA: Primary | ICD-10-CM

## 2022-12-07 DIAGNOSIS — M54.41 CHRONIC BILATERAL LOW BACK PAIN WITH BILATERAL SCIATICA: ICD-10-CM

## 2022-12-07 DIAGNOSIS — M47.816 LUMBAR FACET ARTHROPATHY: ICD-10-CM

## 2022-12-07 DIAGNOSIS — G89.29 CHRONIC BILATERAL LOW BACK PAIN WITH BILATERAL SCIATICA: ICD-10-CM

## 2022-12-07 DIAGNOSIS — M54.16 LUMBAR RADICULOPATHY: ICD-10-CM

## 2022-12-07 DIAGNOSIS — M54.41 CHRONIC BILATERAL LOW BACK PAIN WITH RIGHT-SIDED SCIATICA: Primary | ICD-10-CM

## 2022-12-07 DIAGNOSIS — M54.42 CHRONIC BILATERAL LOW BACK PAIN WITH BILATERAL SCIATICA: ICD-10-CM

## 2022-12-07 RX ORDER — MELOXICAM 15 MG/1
15 TABLET ORAL DAILY PRN
Qty: 30 TABLET | Refills: 2 | Status: SHIPPED | OUTPATIENT
Start: 2022-12-07

## 2022-12-07 RX ORDER — LIDOCAINE 50 MG/G
1 PATCH TOPICAL DAILY
Qty: 15 PATCH | Refills: 3 | Status: SHIPPED | OUTPATIENT
Start: 2022-12-07

## 2022-12-09 ENCOUNTER — TELEPHONE (OUTPATIENT)
Dept: PAIN MEDICINE | Facility: CLINIC | Age: 42
End: 2022-12-09

## 2022-12-09 NOTE — TELEPHONE ENCOUNTER
Caller: [facility]     Doctor: Trinity Willett    Reason for call: pharmacy is saying that the patches will be on back order until the 1st of the year  Did you want any new orders?     Call back#: [phone]  Fax: [phone]

## 2022-12-12 DIAGNOSIS — M54.42 CHRONIC BILATERAL LOW BACK PAIN WITH BILATERAL SCIATICA: ICD-10-CM

## 2022-12-12 DIAGNOSIS — G89.29 CHRONIC BILATERAL LOW BACK PAIN WITH BILATERAL SCIATICA: ICD-10-CM

## 2022-12-12 DIAGNOSIS — M54.41 CHRONIC BILATERAL LOW BACK PAIN WITH BILATERAL SCIATICA: ICD-10-CM

## 2022-12-12 RX ORDER — LIDOCAINE 4 G/G
1 PATCH TOPICAL DAILY
Qty: 10 PATCH | Refills: 2 | Status: SHIPPED | OUTPATIENT
Start: 2022-12-12 | End: 2023-09-01

## 2022-12-12 NOTE — TELEPHONE ENCOUNTER
S/w nurse at [facility] pharmacy can not obtain 5% lidocaine patches til after January first  They are asking you send a script for the 4% lidocaine patch to pharmacy at this time    No need to cb if alternative sent

## 2023-03-03 DIAGNOSIS — M54.42 CHRONIC BILATERAL LOW BACK PAIN WITH BILATERAL SCIATICA: ICD-10-CM

## 2023-03-03 DIAGNOSIS — M54.41 CHRONIC BILATERAL LOW BACK PAIN WITH BILATERAL SCIATICA: ICD-10-CM

## 2023-03-03 DIAGNOSIS — G89.29 CHRONIC BILATERAL LOW BACK PAIN WITH BILATERAL SCIATICA: ICD-10-CM

## 2023-05-03 DIAGNOSIS — M54.42 CHRONIC BILATERAL LOW BACK PAIN WITH BILATERAL SCIATICA: ICD-10-CM

## 2023-05-03 DIAGNOSIS — M54.41 CHRONIC BILATERAL LOW BACK PAIN WITH BILATERAL SCIATICA: ICD-10-CM

## 2023-05-03 DIAGNOSIS — G89.29 CHRONIC BILATERAL LOW BACK PAIN WITH BILATERAL SCIATICA: ICD-10-CM

## 2023-05-25 DIAGNOSIS — M54.42 CHRONIC BILATERAL LOW BACK PAIN WITH BILATERAL SCIATICA: ICD-10-CM

## 2023-05-25 DIAGNOSIS — G89.29 CHRONIC BILATERAL LOW BACK PAIN WITH BILATERAL SCIATICA: ICD-10-CM

## 2023-05-25 DIAGNOSIS — M54.41 CHRONIC BILATERAL LOW BACK PAIN WITH BILATERAL SCIATICA: ICD-10-CM

## 2023-06-01 RX ORDER — LIDOCAINE PAIN RELIEF 40 MG/1000MG
PATCH TOPICAL
Refills: 0 | OUTPATIENT
Start: 2023-06-01

## 2023-06-07 NOTE — PROGRESS NOTES
Pain Medicine Follow-Up Note    Assessment:  1  Chronic pain syndrome    2  Lumbar radiculopathy    3  Chronic right hip pain    4  Chronic bilateral low back pain with right-sided sciatica        Plan:  Orders Placed This Encounter   Procedures   • FL spine and pain procedure     Standing Status:   Future     Standing Expiration Date:   6/8/2027     Order Specific Question:   Reason for Exam:     Answer:   L3-L4 LESI     Order Specific Question:   Is the patient pregnant? Answer:   No     Order Specific Question:   Anticoagulant hold needed? Answer:   no   • XR hip/pelv 2-3 vws right if performed     Standing Status:   Future     Standing Expiration Date:   6/8/2027     Scheduling Instructions:      Bring along any outside films relating to this procedure  Order Specific Question:   Is the patient pregnant?      Answer:   No   • Ambulatory referral to Physical Therapy     Standing Status:   Future     Standing Expiration Date:   6/8/2024     Referral Priority:   Routine     Referral Type:   Physical Therapy     Referral Reason:   Specialty Services Required     Requested Specialty:   Physical Therapy     Number of Visits Requested:   1     Expiration Date:   6/8/2024       New Medications Ordered This Visit   Medications   • amoxicillin (AMOXIL) 400 MG/5ML suspension   • Calcium 600-D 600-10 MG-MCG TABS   • Refresh Tears 0 5 % SOLN   • D3 Super Strength 50 MCG (2000 UT) CAPS   • hydrocortisone (CORTEF) 10 mg tablet   • levothyroxine 112 mcg tablet   • QUEtiapine (SEROquel XR) 50 mg   • lidocaine (Lidoderm) 5 %     Sig: Apply 2 patches topically over 12 hours daily Remove & Discard patch within 12 hours or as directed by MD     Dispense:  60 patch     Refill:  3   • meloxicam (MOBIC) 15 mg tablet     Sig: Take 1 tablet (15 mg total) by mouth daily as needed for moderate pain     Dispense:  30 tablet     Refill:  2   • acetaminophen (TYLENOL) 650 mg CR tablet     Sig: Take 1 tablet (650 mg total) by mouth every 8 (eight) hours as needed for mild pain     Dispense:  90 tablet     Refill:  1       My impressions and treatment recommendations were discussed in detail with the patient who verbalized understanding and had no further questions  Patient presents to the office following up on her chronic low back pain  Patient states that the pain is primarily on the right low back radiating into her right hip  At last visit patient was encouraged to trial an epidural steroid injection however at that time the patient was not ready  Today the patient states she is ready to trial a lumbar epidural steroid injection this time I recommend that the patient have L3-L4 lumbar epidural steroid injection for the patient's pain pattern following an L3 dermatome pattern which correlates with the patient's imaging  Patient continues to report an overall reduction of her pain and in the level of functioning using the meloxicam milligrams daily as needed along with Lidoderm patches daily  I also encouraged the patient to trial Tylenol arthritis every 8 hours as needed  The patient was cautioned about possible side effects and risks of NSAID medications including stomach upset, stomach ulcers, kidney risks and cardiovascular risks to include hypertension and slightly increased risks of stroke and MI  Patient was also educated on need to and to perform daily exercises in order to reduce her pain  Patient also wishes to pursue physical therapy which I agree will further help relieve her low back pain  Complete risks and benefits including bleeding, infection, tissue reaction, nerve injury and allergic reaction were discussed  The approach was demonstrated using models and literature was provided  Verbal and written consent was obtained  Follow-up is planned in 6 months for medication refill time or sooner as warranted  Discharge instructions were provided   I personally saw and examined the patient and I agree with the above discussed plan of care  History of Present Illness:    Mike Glover is a 39 y o  female who presents to [facility] for interval re-evaluation of the above stated pain complaints  The patient has a past medical and chronic pain history as outlined in the assessment section  She was last seen on 12/7/2022  At today's visit patient states that their pain symptoms are better with a pain score of 3/10 on the verbal numeric pain scale  The patient's pain is worse at night intermittently  The patient's pain is intermittent in nature  And the quality of the patient's pain is described as sharp and shooting  The patient's pain is located in the mid to right low back radiating into her right hip  Patient states the amount of pain relief she is now obtaining from her current pain relievers is enough a real difference in her life  Other than as stated above, the patient denies any interval changes in medications, medical condition, mental condition, symptoms, or allergies since the last office visit  Review of Systems:    Review of Systems   Respiratory: Negative for shortness of breath  Cardiovascular: Negative for chest pain  Gastrointestinal: Negative for constipation, diarrhea, nausea and vomiting  Musculoskeletal: Positive for gait problem  Negative for arthralgias, joint swelling and myalgias  DROM   Skin: Negative for rash  Neurological: Negative for dizziness, seizures and weakness  All other systems reviewed and are negative  Past Medical History:   Diagnosis Date   • Adrenocortical insufficiency ([facility] [address] )    • Disease of thyroid gland     hypo   • Disorder of bone density and structure, unspecified    • Epilepsy ([facility] [address] )    • Hyperlipidemia    • Hypopituitarism ([facility] [address] )    • Mild intellectual disabilities    • Psychiatric disorder     bipolar       History reviewed  No pertinent surgical history  History reviewed   No pertinent family history      Social History     Occupational History   • Not on file   Tobacco Use   • Smoking status: Never   • Smokeless tobacco: Never   Vaping Use   • Vaping Use: Never used   Substance and Sexual Activity   • Alcohol use: Not Currently   • Drug use: Not Currently   • Sexual activity: Not on file         Current Outpatient Medications:   •  acetaminophen (TYLENOL) 325 mg tablet, Take 650 mg by mouth every 6 (six) hours as needed for mild pain, Disp: , Rfl:   •  acetaminophen (TYLENOL) 650 mg CR tablet, Take 1 tablet (650 mg total) by mouth every 8 (eight) hours as needed for mild pain, Disp: 90 tablet, Rfl: 1  •  albuterol (PROVENTIL HFA,VENTOLIN HFA) 90 mcg/act inhaler, Inhale 2 puffs every 6 (six) hours as needed for wheezing, Disp: , Rfl:   •  ARIPiprazole (ABILIFY) 5 mg tablet, , Disp: , Rfl:   •  Calcium Carbonate-Vitamin D 500-125 MG-UNIT TABS, Take 1 tablet by mouth 2 (two) times a day, Disp: , Rfl:   •  Cholecalciferol (VITAMIN D) 50 MCG (2000 UT) tablet, Take 2,000 Units by mouth daily, Disp: , Rfl:   •  clonazePAM (KlonoPIN) 0 5 mg tablet, Take 0 5 mg by mouth daily, Disp: , Rfl:   •  clonazePAM (KlonoPIN) 1 mg tablet, Take 1 mg by mouth 2 (two) times a day, Disp: , Rfl:   •  divalproex sodium (DEPAKOTE ER) 500 mg 24 hr tablet, Take 1,000 mg by mouth daily at bedtime, Disp: , Rfl:   •  docusate sodium (COLACE) 100 mg capsule, Take 100 mg by mouth daily, Disp: , Rfl:   •  furosemide (LASIX) 20 mg tablet, Take 20 mg by mouth daily, Disp: , Rfl:   •  Glycerin-Polysorbate 80 (REFRESH DRY EYE THERAPY OP), Apply 1 application to eye 2 (two) times a day, Disp: , Rfl:   •  hydrocortisone (CORTEF) 5 mg tablet, Take 15 mg by mouth daily with breakfast, Disp: , Rfl:   •  ibuprofen (MOTRIN) 200 mg tablet, Take 200 mg by mouth every 6 (six) hours as needed for mild pain, Disp: , Rfl:   •  Levothyroxine Sodium (SYNTHROID PO), Take 336 mcg by mouth, Disp: , Rfl:   •  lidocaine (Lidoderm) 5 %, Apply 2 patches topically over 12 hours daily Remove & Discard patch within 12 hours or as directed by MD, Disp: 60 patch, Rfl: 3  •  Lidocaine 4 % PTCH, Apply 1 patch topically daily Remove & Discard patch within 12 hours, Disp: 10 patch, Rfl: 2  •  meclizine (ANTIVERT) 25 mg tablet, Take 25 mg by mouth every 12 (twelve) hours as needed for dizziness, Disp: , Rfl:   •  meloxicam (MOBIC) 15 mg tablet, Take 1 tablet (15 mg total) by mouth daily as needed for moderate pain, Disp: 30 tablet, Rfl: 2  •  Multiple Vitamin (MULTIVITAMIN) tablet, Take 1 tablet by mouth daily, Disp: , Rfl:   •  mupirocin (BACTROBAN) 2 % ointment, Apply 1 application topically 3 (three) times a day, Disp: , Rfl:   •  Omega-3 Fatty Acids (FISH OIL) 1,000 mg, Take 1,000 mg by mouth 3 (three) times a day, Disp: , Rfl:   •  QUEtiapine (SEROquel) 50 mg tablet, Take 100 mg by mouth daily at bedtime, Disp: , Rfl:   •  sertraline (ZOLOFT) 100 mg tablet, Take 100 mg by mouth daily, Disp: , Rfl:   •  sertraline (ZOLOFT) 50 mg tablet, Take 50 mg by mouth daily at bedtime, Disp: , Rfl:   •  triamcinolone (KENALOG) 0 1 % cream, Apply topically 2 (two) times a day as needed (ezcema), Disp: , Rfl:   •  amoxicillin (AMOXIL) 400 MG/5ML suspension, , Disp: , Rfl:   •  Calcium 600-D 600-10 MG-MCG TABS, , Disp: , Rfl:   •  D3 Super Strength 50 MCG (2000 UT) CAPS, , Disp: , Rfl:   •  hydrocortisone (CORTEF) 10 mg tablet, , Disp: , Rfl:   •  HYDROCORTISONE PO, Take 5 mg by mouth daily (Patient not taking: Reported on 8/29/2022), Disp: , Rfl:   •  levothyroxine 112 mcg tablet, , Disp: , Rfl:   •  Levothyroxine Sodium (SYNTHROID PO), Take 224 mcg by mouth daily (Patient not taking: Reported on 8/29/2022), Disp: , Rfl:   •  QUEtiapine (SEROquel XR) 50 mg, , Disp: , Rfl:   •  Refresh Tears 0 5 % SOLN, , Disp: , Rfl:     Allergies   Allergen Reactions   • Haldol [Haloperidol] Other (See Comments)     dyskenesia   • Thioridazine        Physical Exam:    /80 (BP Location: Right arm, Patient Position: Sitting, Cuff Size: Standard)   Pulse 75   Wt 129 kg (283 lb 9 6 oz)   BMI 43 12 kg/m²     Constitutional:normal, well developed, well nourished, alert, in no distress and non-toxic and no overt pain behavior  , obese and Cognitive disability, mother present,  present  Eyes:anicteric  HEENT:grossly intact  Neck:supple, symmetric, trachea midline and no masses   Pulmonary:even and unlabored  Cardiovascular:No edema or pitting edema present  Skin:Normal without rashes or lesions and well hydrated  Psychiatric:Mood and affect appropriate  Neurologic:Cranial Nerves II-XII grossly intact  Musculoskeletal:antalgic      Imaging  FL spine and pain procedure    (Results Pending)   XR hip/pelv 2-3 vws right if performed    (Results Pending)         Orders Placed This Encounter   Procedures   • FL spine and pain procedure   • XR hip/pelv 2-3 vws right if performed   • Ambulatory referral to Physical Therapy       This document was created using speech voice recognition software  Grammatical errors, random word insertions, pronoun errors, and incomplete sentences are an occasional consequence of this system due to software limitations, ambient noise, and hardware issues  Any formal questions or concerns about content, text, or information contained within the body of this dictation should be directly addressed to the provider for clarification

## 2023-06-08 ENCOUNTER — OFFICE VISIT (OUTPATIENT)
Dept: PAIN MEDICINE | Facility: CLINIC | Age: 43
End: 2023-06-08
Payer: MEDICARE

## 2023-06-08 ENCOUNTER — APPOINTMENT (OUTPATIENT)
Dept: RADIOLOGY | Facility: CLINIC | Age: 43
End: 2023-06-08
Payer: MEDICARE

## 2023-06-08 VITALS
BODY MASS INDEX: 43.12 KG/M2 | DIASTOLIC BLOOD PRESSURE: 80 MMHG | WEIGHT: 283.6 LBS | HEART RATE: 75 BPM | SYSTOLIC BLOOD PRESSURE: 120 MMHG

## 2023-06-08 DIAGNOSIS — M25.551 CHRONIC RIGHT HIP PAIN: ICD-10-CM

## 2023-06-08 DIAGNOSIS — G89.29 CHRONIC RIGHT HIP PAIN: ICD-10-CM

## 2023-06-08 DIAGNOSIS — G89.29 CHRONIC BILATERAL LOW BACK PAIN WITH RIGHT-SIDED SCIATICA: ICD-10-CM

## 2023-06-08 DIAGNOSIS — G89.4 CHRONIC PAIN SYNDROME: Primary | ICD-10-CM

## 2023-06-08 DIAGNOSIS — M54.41 CHRONIC BILATERAL LOW BACK PAIN WITH RIGHT-SIDED SCIATICA: ICD-10-CM

## 2023-06-08 DIAGNOSIS — M54.16 LUMBAR RADICULOPATHY: ICD-10-CM

## 2023-06-08 PROCEDURE — 73502 X-RAY EXAM HIP UNI 2-3 VIEWS: CPT

## 2023-06-08 PROCEDURE — 99214 OFFICE O/P EST MOD 30 MIN: CPT

## 2023-06-08 RX ORDER — CHOLECALCIFEROL (VITAMIN D3) 50 MCG
CAPSULE ORAL
COMMUNITY
Start: 2023-06-06

## 2023-06-08 RX ORDER — CALCIUM CARBONATE/VITAMIN D3 600 MG-10
TABLET ORAL
COMMUNITY
Start: 2023-06-06

## 2023-06-08 RX ORDER — SENNOSIDES 8.6 MG
650 CAPSULE ORAL EVERY 8 HOURS PRN
Qty: 90 TABLET | Refills: 1 | Status: SHIPPED | OUTPATIENT
Start: 2023-06-08

## 2023-06-08 RX ORDER — LEVOTHYROXINE SODIUM 112 UG/1
TABLET ORAL
COMMUNITY
Start: 2023-06-06

## 2023-06-08 RX ORDER — AMOXICILLIN 400 MG/5ML
POWDER, FOR SUSPENSION ORAL
COMMUNITY
Start: 2023-04-23

## 2023-06-08 RX ORDER — CARBOXYMETHYLCELLULOSE SODIUM 5 MG/ML
SOLUTION/ DROPS OPHTHALMIC
COMMUNITY
Start: 2023-05-25

## 2023-06-08 RX ORDER — LIDOCAINE 50 MG/G
2 PATCH TOPICAL DAILY
Qty: 60 PATCH | Refills: 3 | Status: SHIPPED | OUTPATIENT
Start: 2023-06-08

## 2023-06-08 RX ORDER — HYDROCORTISONE 10 MG/1
TABLET ORAL
COMMUNITY
Start: 2023-06-06

## 2023-06-08 RX ORDER — QUETIAPINE FUMARATE 50 MG/1
TABLET, EXTENDED RELEASE ORAL
COMMUNITY
Start: 2023-06-06

## 2023-06-08 RX ORDER — MELOXICAM 15 MG/1
15 TABLET ORAL DAILY PRN
Qty: 30 TABLET | Refills: 2 | Status: SHIPPED | OUTPATIENT
Start: 2023-06-08

## 2023-06-08 NOTE — PATIENT INSTRUCTIONS
Epidural Steroid Injection, Ambulatory Care   GENERAL INFORMATION:   What do I need to know about an epidural steroid injection? An epidural steroid injection (PEARL) is a procedure to inject steroid medicine into the epidural space  The epidural space is between your spinal cord and vertebrae  Steroids reduce inflammation and fluid buildup in your spine that may be causing pain  You may be given pain medicine along with the steroids  How do I prepare for an PEARL? Your healthcare provider will talk to you about how to prepare for your procedure  He will tell you what medicines to take or not take on the day of your procedure  You may need to stop taking blood thinners or other medicines several days before your procedure  You may need to adjust any diabetes medicine you take on the day of your procedure  Steroid medicine can increase your blood sugar level  What will happen during an PEARL? You will be given medicine to numb the procedure area  You will be awake for the procedure, but you will not feel pain  You may also be given medicine to help you relax during the procedure  Contrast liquid will be used to help your healthcare provider see the area better  Tell the healthcare provider if you have ever had an allergic reaction to contrast liquid  Your healthcare provider may place the needle into your neck area, middle of your back, or tailbone area  He may inject the medicine next to the nerves that are causing your pain  He may instead inject the medicine into a larger area of the epidural space  This helps the medicine spread to more nerves  Your healthcare provider will use a fluoroscope to help guide the needle to the right place  A fluoroscope is a type of x-ray  After the procedure, a bandage will be placed over the injection site to prevent infection  What are the risks of an PEARL? You may have temporary or permanent nerve damage or paralysis   You may have bleeding or develop a serious infection, such as meningitis (swelling of the brain coverings)  An abscess may also develop  You may need surgery to fix the abscess  You may have a seizure, anxiety, or trouble sleeping  If you are a man, you may have temporary erectile dysfunction (not able to have an erection)  CARE AGREEMENT:   You have the right to help plan your care  Learn about your health condition and how it may be treated  Discuss treatment options with your caregivers to decide what care you want to receive  You always have the right to refuse treatment  The above information is an  only  It is not intended as medical advice for individual conditions or treatments  Talk to your doctor, nurse or pharmacist before following any medical regimen to see if it is safe and effective for you  © 2014 [address] is for End User's use only and may not be sold, redistributed or otherwise used for commercial purposes  All illustrations and images included in CareNotes® are the copyrighted property of [employer]  or [employer]

## 2023-06-13 ENCOUNTER — TELEPHONE (OUTPATIENT)
Dept: RADIOLOGY | Facility: CLINIC | Age: 43
End: 2023-06-13

## 2023-06-13 NOTE — TELEPHONE ENCOUNTER
----- Message from Diego Sawyer, [address] Hart sent at 6/12/2023  7:39 PM EDT -----  Moderate right hip osteoarthritis is seen  If her pain persists after getting the epidural steroid injection she may benefit from a right intra-articular hip injection

## 2023-06-23 ENCOUNTER — HOSPITAL ENCOUNTER (EMERGENCY)
Facility: HOSPITAL | Age: 43
Discharge: HOME | End: 2023-06-23
Attending: EMERGENCY MEDICINE

## 2023-06-23 VITALS
OXYGEN SATURATION: 98 % | RESPIRATION RATE: 20 BRPM | DIASTOLIC BLOOD PRESSURE: 93 MMHG | SYSTOLIC BLOOD PRESSURE: 127 MMHG | TEMPERATURE: 98.4 F | HEART RATE: 79 BPM
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
## 2023-06-23 PROCEDURE — 99283 EMERGENCY DEPT VISIT LOW MDM: CPT | Performed by: EMERGENCY MEDICINE

## 2023-06-23 PROCEDURE — 99284 EMERGENCY DEPT VISIT MOD MDM: CPT
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
## 2023-07-18 ENCOUNTER — TELEPHONE (OUTPATIENT)
Dept: PAIN MEDICINE | Facility: CLINIC | Age: 43
End: 2023-07-18

## 2023-07-18 NOTE — TELEPHONE ENCOUNTER
Scheduled patient for  LESI 8/18/23  Patient denies RX blood thinners/ NSAIDS  Nothing to eat or drink 1 hour prior to procedure  Needs to arrange transportation  Proper clothing for procedure  No vaccines 2 weeks prior or after procedure  If ill or place on antibiotics, please call to reschedule      Asking for 9 or 10 am because of transport

## 2023-07-18 NOTE — TELEPHONE ENCOUNTER
----- Message from Chris Quinonez MD sent at 7/18/2023 10:14 AM EDT -----  Regarding: RE: Transfer of Care  Looks like she is seeing Everette Devine regularly. You can schedule the procedure and have her see Stefano Rai at the [facility] office 4 weeks after the procedure for a re-evaluation. Just let Stefano Rai know that this is a ZANDER from [place] to me.  ----- Message -----  From: Monse Peña  Sent: 7/18/2023  10:00 AM EDT  To: Chris Quinonez MD  Subject: RE: Transfer of Care                             Do you want to see her in the office or should I schedule the same procedure?  ----- Message -----  From: Chris Quinonez MD  Sent: 7/18/2023   9:56 AM EDT  To: Monse Peña; Coleman Madrid DO  Subject: RE: Transfer of Care                             Sure. I can see her.  ----- Message -----  From: Monse Peña  Sent: 7/18/2023   9:55 AM EDT  To: Chris Quinonez MD; Coleman Madrid DO  Subject: FW: Transfer of Care                             Have either one of you accepted the [facility]?  ----- Message -----  From: Emma Pandya  Sent: 7/18/2023   8:46 AM EDT  To: Mnose Peña  Subject: Transfer of Care                                 Good Morning,    Patient was scheduled with [place] today but due to having [address] she will need to be seen in [address] [place] [address]. [facility] has approved the [facility] and she is able to see Wilfredo Goldstein or Oscar Forbes. Would you please reach out to the patient to schedule? L3-L4 WINTSON  [id number]      Thank you !

## 2023-08-01 RX ORDER — LIDOCAINE PAIN RELIEF 40 MG/1000MG
PATCH TOPICAL
Refills: 0 | OUTPATIENT
Start: 2023-08-01

## 2023-08-17 ENCOUNTER — TELEPHONE (OUTPATIENT)
Age: 43
End: 2023-08-17

## 2023-08-17 NOTE — TELEPHONE ENCOUNTER
The pt is unable to sing for herself because of mental and physical capacity are limited.   I will speak with management on who can sign for her procedure

## 2023-08-17 NOTE — TELEPHONE ENCOUNTER
Call from [facility]    castillo    Wants to know if the procedure consent forms that were signed in [facility] will apply to the procedure scheduled in [place]?     # [phone]

## 2023-08-18 ENCOUNTER — TELEPHONE (OUTPATIENT)
Age: 43
End: 2023-08-18

## 2023-08-18 DIAGNOSIS — M54.41 CHRONIC BILATERAL LOW BACK PAIN WITH RIGHT-SIDED SCIATICA: ICD-10-CM

## 2023-08-18 DIAGNOSIS — G89.29 CHRONIC BILATERAL LOW BACK PAIN WITH RIGHT-SIDED SCIATICA: ICD-10-CM

## 2023-08-18 RX ORDER — LIDOCAINE 50 MG/G
2 PATCH TOPICAL DAILY
Qty: 60 PATCH | Refills: 3 | OUTPATIENT
Start: 2023-08-18

## 2023-08-18 NOTE — TELEPHONE ENCOUNTER
Pharmacy called in stating medication needs prior auth and if it can be expendited. To please call back [phone] speak with miky    Call completed TF    Routed to pod.  And [facility] pa specialist    Medication lidocaine    Quantity 60 patch    Pharmacy [facility]    PCP/Speciality [facility]    Enough for 3 days -no

## 2023-08-18 NOTE — TELEPHONE ENCOUNTER
AN EXPIDITED PA REQUEST  FOR LIDODERM PATCHES 5% HAS BEEN SUBMITTED TO PLAN FOR DETERMINATION    [facility] LUCERO [id number]  WILL AWAIT PLAN RESPONSE

## 2023-08-18 NOTE — TELEPHONE ENCOUNTER
Spoke directly with [facility]. Lidocaine patches 5% have been approved. Pharmacy notified. They will fax approval to be scanned into pt chart.

## 2023-08-18 NOTE — TELEPHONE ENCOUNTER
Received call from [facility] of [address] requesting diagnosis for order for lidocaine (Lidoderm) 5 % ordered 6/8/23. She will be faxing form to office to complete and returned. Please follow up with [facility] if any questions or to notify form completion and return.

## 2023-08-21 NOTE — TELEPHONE ENCOUNTER
Per Steve Oh I "they need to call the motehr to get verbal consent for hospital to treat and consent for procedure"  I updated the mother's number phones and let the nursing home know.

## 2023-08-23 ENCOUNTER — HOSPITAL ENCOUNTER (OUTPATIENT)
Dept: RADIOLOGY | Facility: HOSPITAL | Age: 43
Setting detail: OUTPATIENT SURGERY
Discharge: HOME | End: 2023-08-23
Payer: MEDICARE

## 2023-08-23 ENCOUNTER — HOSPITAL ENCOUNTER (OUTPATIENT)
Facility: AMBULARY SURGERY CENTER | Age: 43
Setting detail: OUTPATIENT SURGERY
Discharge: HOME | End: 2023-08-23
Attending: PHYSICAL MEDICINE & REHABILITATION | Admitting: PHYSICAL MEDICINE & REHABILITATION
Payer: MEDICARE

## 2023-08-23 VITALS
RESPIRATION RATE: 20 BRPM | DIASTOLIC BLOOD PRESSURE: 99 MMHG | HEART RATE: 74 BPM | TEMPERATURE: 96 F | OXYGEN SATURATION: 97 % | SYSTOLIC BLOOD PRESSURE: 140 MMHG

## 2023-08-23 DIAGNOSIS — Z92.241 S/P EPIDURAL STEROID INJECTION: ICD-10-CM

## 2023-08-23 PROCEDURE — 62323 NJX INTERLAMINAR LMBR/SAC: CPT | Performed by: PHYSICAL MEDICINE & REHABILITATION

## 2023-08-23 RX ORDER — METHYLPREDNISOLONE ACETATE 40 MG/ML
INJECTION, SUSPENSION INTRA-ARTICULAR; INTRALESIONAL; INTRAMUSCULAR; SOFT TISSUE AS NEEDED
Status: DISCONTINUED | OUTPATIENT
Start: 2023-08-23 | End: 2023-08-23 | Stop reason: HOSPADM

## 2023-08-23 RX ORDER — SODIUM CHLORIDE 9 MG/ML
INJECTION INTRAVENOUS AS NEEDED
Status: DISCONTINUED | OUTPATIENT
Start: 2023-08-23 | End: 2023-08-23 | Stop reason: HOSPADM

## 2023-08-23 RX ORDER — LIDOCAINE PAIN RELIEF 40 MG/1000MG
PATCH TOPICAL
Refills: 0 | OUTPATIENT
Start: 2023-08-23

## 2023-08-23 RX ORDER — LIDOCAINE HYDROCHLORIDE 10 MG/ML
INJECTION, SOLUTION EPIDURAL; INFILTRATION; INTRACAUDAL; PERINEURAL AS NEEDED
Status: DISCONTINUED | OUTPATIENT
Start: 2023-08-23 | End: 2023-08-23 | Stop reason: HOSPADM

## 2023-08-23 NOTE — DISCHARGE INSTRUCTIONS
Epidural Steroid Injection   WHAT YOU NEED TO KNOW:   An epidural steroid injection (PEARL) is a procedure to inject steroid medicine into the epidural space. The epidural space is between your spinal cord and vertebrae. Steroids reduce inflammation and fluid buildup in your spine that may be causing pain. You may be given pain medicine along with the steroids. ACTIVITY  Do not drive or operate machinery today. No strenuous activity today - bending, lifting, etc.  You may resume normal activites starting tomorrow - start slowly and as tolerated. You may shower today, but no tub baths or hot tubs. You may have numbness for several hours from the local anesthetic. Please use caution and common sense, especially with weight-bearing activities. CARE OF THE INJECTION SITE  If you have soreness or pain, apply ice to the area today (20 minutes on/20 minutes off). Starting tomorrow, you may use warm, moist heat or ice if needed. You may have an increase or change in your discomfort for 36-48 hours after your treatment. Apply ice and continue with any pain medication you have been prescribed. Notify the [facility] if you have any of the following: redness, drainage, swelling, headache, stiff neck or fever above 100°F.    SPECIAL INSTRUCTIONS  Our office will contact you in approximately 7 days for a progress report. MEDICATIONS  Continue to take all routine medications. Our office may have instructed you to hold some medications. As no general anesthesia was used in today's procedure, you should not experience any side effects related to anesthesia. If you are diabetic, the steroids used in today's injection may temporarily increase your blood sugar levels after the first few days after your injection. Please keep a close eye on your sugars and alert the doctor who manages your diabetes if your sugars are significantly high from your baseline or you are symptomatic.      If you have a problem specifically related to your procedure, please call our office at [phone]. Problems not related to your procedure should be directed to your primary care physician.

## 2023-08-23 NOTE — OP NOTE
OPERATIVE REPORT  PATIENT NAME: Doreen Beck    :  1980  MRN: [id number]  Pt Location: [facility] MINOR/PAIN ROOM 01    SURGERY DATE: 2023    Surgeon(s) and Role:     * Christian Yadav,  - Primary     * Isela Campo MD - Observing    Preop Diagnosis:  Lumbar radiculopathy [M54.16]    Post-Op Diagnosis Codes:     * Lumbar radiculopathy [M54.16]    Procedure(s):  L3-L4  LUMBAR epidural steroid injection (98261)  Lumbar epidural  Indication:  Back and radiating leg pain  Preoperative diagnosis:  Lumbar radiculitis  Postoperative diagnosis:  Lumbar radiculitis    Procedure: Fluoroscopically-guided L3-L4 interlaminar epidural steroid injection under fluoroscopy    EBL:  none  Specimens:  not applicable    After discussing the risks, benefits, and alternatives to the procedure, the patient expressed understanding and wished to proceed. The patient was brought to the fluoroscopy suite and placed in the prone position. A procedural pause was conducted to verify:  correct patient identity, procedure to be performed and as applicable, correct side and site, correct patient position, and availability of implants, special equipment and special requirements. After identifying the L3-L4 space fluoroscopically, the skin was sterilely prepped and draped in the usual fashion using Chloraprep skin prep. The skin and subcutaneous tissues were anesthetized with 1% lidocaine. Utilizing a loss of resistance technique and intermittent fluoroscopic guidance, a 6 inch 20-gauge Tuohy needle was advanced into the epidural space. Proper needle positioning was confirmed using multiple fluoroscopic views. After negative aspiration, Omnipaque 240 contrast was injected confirming epidural spread without evidence of intravascular or intrathecal spread. A 4 ml solution consisting of 80 mg Depo-Medrol in sterile saline was injected slowly and incrementally into the epidural space.   Following the injection the needle was withdrawn slightly and flushed with 1% buffered lidocaine as it was fully extracted. The patient tolerated the procedure well and there were no apparent complications. After appropriate observation, the patient was dismissed from the clinic in good condition under their own power.           SIGNATURE: Praneeth Lee DO  DATE: August 23, 2023  TIME: 9:09 AM

## 2023-08-23 NOTE — H&P
History of Present Illness: The patient is a 39 y.o. female who presents with complaints of low back pain    Past Medical History:   Diagnosis Date   • Adrenocortical insufficiency (HCC)    • Disease of thyroid gland     hypo   • Disorder of bone density and structure, unspecified    • Epilepsy ([address])    • Hyperlipidemia    • Hypopituitarism ([address])    • Mild intellectual disabilities    • Psychiatric disorder     bipolar       History reviewed. No pertinent surgical history. No current facility-administered medications for this encounter.     Allergies   Allergen Reactions   • Haldol [Haloperidol] Other (See Comments)     dyskenesia   • Thioridazine        Physical Exam:   Vitals:    08/23/23 0844   BP: 139/92   Pulse: 78   Resp: 20   Temp: (!) 96 °F (35.6 °C)   SpO2: 95%     General: Awake, Alert, Oriented x 3, Mood and affect appropriate  Respiratory: Respirations even and unlabored  Cardiovascular: Peripheral pulses intact; no edema  Musculoskeletal Exam: Tenderness palpation bilateral lumbar paraspinals    ASA Score: 2    Patient/Chart Verification  Patient ID Verified: Verbal, Armband  ID Band Applied: Yes  Consents Confirmed: Procedural  H&P( within 30 days) Verified: Yes  Interval H&P(within 24 hr) Complete (required for Outpatients and Surgery Admit only): Yes  Beta Blocker given : N/A  Pre-op Lab/Test Results Available: N/A  Pregnancy Lab Collected: N/A comment  Does Patient Have a Prosthetic Device/Implant: No    Assessment: Lumbar radiculopathy  Plan: L3-4 LESI

## 2023-08-30 ENCOUNTER — TELEPHONE (OUTPATIENT)
Dept: PAIN MEDICINE | Facility: CLINIC | Age: 43
End: 2023-08-30

## 2023-08-30 NOTE — TELEPHONE ENCOUNTER
1st attempt  Lm to cb with % improvement and pain level. Left message with staff, provided the questions and call back number so they can ask the patient.

## 2023-09-01 ENCOUNTER — TELEPHONE (OUTPATIENT)
Age: 43
End: 2023-09-01

## 2023-09-01 DIAGNOSIS — G89.29 CHRONIC BILATERAL LOW BACK PAIN WITH RIGHT-SIDED SCIATICA: ICD-10-CM

## 2023-09-01 DIAGNOSIS — M54.41 CHRONIC BILATERAL LOW BACK PAIN WITH RIGHT-SIDED SCIATICA: ICD-10-CM

## 2023-09-01 RX ORDER — LIDOCAINE 50 MG/G
1 PATCH TOPICAL DAILY PRN
Qty: 30 PATCH | Refills: 2 | Status: SHIPPED | OUTPATIENT
Start: 2023-09-01

## 2023-09-01 NOTE — TELEPHONE ENCOUNTER
Caller: Facility     Doctor: Keshia     Reason for call: Facility calling asking if patient lidocaine (Lidoderm) 5 %  Script can be changed to andreea as needed please advise.    Call back#: [phone]

## 2023-09-01 NOTE — TELEPHONE ENCOUNTER
S/W staff at care facility. PT notes since procedure she has 100% relief and is declining the patches. They would like the order to be changed to PRN  The facility needs a new order which can be verbal if you agree AND also a new Rx would need to be sent to [facility] pharmacy on file with prn instructions

## 2023-12-08 ENCOUNTER — OFFICE VISIT (OUTPATIENT)
Dept: PAIN MEDICINE | Facility: CLINIC | Age: 43
End: 2023-12-08
Payer: MEDICARE

## 2023-12-08 VITALS
DIASTOLIC BLOOD PRESSURE: 88 MMHG | SYSTOLIC BLOOD PRESSURE: 129 MMHG | HEIGHT: 68 IN | WEIGHT: 232.4 LBS | HEART RATE: 71 BPM | BODY MASS INDEX: 35.22 KG/M2

## 2023-12-08 DIAGNOSIS — M54.16 LUMBAR RADICULOPATHY: ICD-10-CM

## 2023-12-08 DIAGNOSIS — G89.4 CHRONIC PAIN SYNDROME: Primary | ICD-10-CM

## 2023-12-08 PROCEDURE — 99213 OFFICE O/P EST LOW 20 MIN: CPT

## 2023-12-08 NOTE — PROGRESS NOTES
Pain Medicine Follow-Up Note    Assessment:  1. Chronic pain syndrome    2. Lumbar radiculopathy        Plan:  My impressions and treatment recommendations were discussed in detail with the patient who verbalized understanding and had no further questions. Patient returns to the office after having a L3-L4 lumbar epidural steroid injection on 8/23/2023. Patient states that she has had no pain since the injection. Patient states she is doing significantly better and currently has a pain score of 0 out of 10 on the verbal numeric pain scale. This appointment was originally set up for follow-up on meloxicam however patient states that she has not needed even Tylenol. Explained to patient if her pain should return she may follow-up in office for evaluation and possibly another injection. Follow-up is planned in as needed time or sooner as warranted. Discharge instructions were provided. I personally saw and examined the patient and I agree with the above discussed plan of care. History of Present Illness:    Tata Phoenix is a 39 y.o. female who presents to [facility] for interval re-evaluation of the above stated pain complaints. The patient has a past medical and chronic pain history as outlined in the assessment section. She was last seen on 8/23/2023. At today's visit patient states that their pain symptoms are better with a pain score of 0/10 on the verbal numeric pain scale. Patient states she is not having any pain so she does not have any pain to describe. Other than as stated above, the patient denies any interval changes in medications, medical condition, mental condition, symptoms, or allergies since the last office visit. Review of Systems:    Review of Systems   Respiratory:  Negative for shortness of breath. Cardiovascular:  Negative for chest pain. Gastrointestinal:  Negative for constipation, diarrhea, nausea and vomiting.    Musculoskeletal: Negative for arthralgias, gait problem, joint swelling and myalgias. Skin:  Negative for rash. Neurological:  Negative for dizziness, seizures and weakness. All other systems reviewed and are negative. Past Medical History:   Diagnosis Date    Adrenocortical insufficiency ([address])     Disease of thyroid gland     hypo    Disorder of bone density and structure, unspecified     Epilepsy ([address])     Hyperlipidemia     Hypopituitarism ([address])     Mild intellectual disabilities     Psychiatric disorder     bipolar       Past Surgical History:   Procedure Laterality Date    EPIDURAL BLOCK INJECTION N/A 8/23/2023    Procedure: L3-L4  LUMBAR epidural steroid injection (67800); Surgeon: Marilin Abdi DO;  Location: [facility] MAIN OR;  Service: Pain Management        History reviewed. No pertinent family history.     Social History     Occupational History    Not on file   Tobacco Use    Smoking status: Never    Smokeless tobacco: Never   Vaping Use    Vaping Use: Never used   Substance and Sexual Activity    Alcohol use: Not Currently    Drug use: Not Currently    Sexual activity: Not on file         Current Outpatient Medications:     albuterol (PROVENTIL HFA,VENTOLIN HFA) 90 mcg/act inhaler, Inhale 2 puffs every 6 (six) hours as needed for wheezing, Disp: , Rfl:     ARIPiprazole (ABILIFY) 5 mg tablet, , Disp: , Rfl:     Calcium 600-D 600-10 MG-MCG TABS, , Disp: , Rfl:     Calcium Carbonate-Vitamin D 500-125 MG-UNIT TABS, Take 1 tablet by mouth 2 (two) times a day, Disp: , Rfl:     Cholecalciferol (VITAMIN D) 50 MCG (2000 UT) tablet, Take 2,000 Units by mouth daily, Disp: , Rfl:     clonazePAM (KlonoPIN) 0.5 mg tablet, Take 0.5 mg by mouth daily, Disp: , Rfl:     clonazePAM (KlonoPIN) 1 mg tablet, Take 1 mg by mouth 2 (two) times a day, Disp: , Rfl:     divalproex sodium (DEPAKOTE ER) 500 mg 24 hr tablet, Take 1,000 mg by mouth daily at bedtime, Disp: , Rfl:     docusate sodium (COLACE) 100 mg capsule, Take 100 mg by mouth daily, Disp: , Rfl:     furosemide (LASIX) 20 mg tablet, Take 20 mg by mouth daily, Disp: , Rfl:     hydrocortisone (CORTEF) 10 mg tablet, , Disp: , Rfl:     hydrocortisone (CORTEF) 5 mg tablet, Take 15 mg by mouth daily with breakfast, Disp: , Rfl:     Levothyroxine Sodium (SYNTHROID PO), Take 336 mcg by mouth, Disp: , Rfl:     lidocaine (Lidoderm) 5 %, Apply 1 patch topically over 12 hours daily as needed (pain) Remove & Discard patch within 12 hours or as directed by MD, Disp: 30 patch, Rfl: 2    meclizine (ANTIVERT) 25 mg tablet, Take 25 mg by mouth every 12 (twelve) hours as needed for dizziness, Disp: , Rfl:     meloxicam (MOBIC) 15 mg tablet, Take 1 tablet (15 mg total) by mouth daily as needed for moderate pain, Disp: 30 tablet, Rfl: 2    Multiple Vitamin (MULTIVITAMIN) tablet, Take 1 tablet by mouth daily, Disp: , Rfl:     mupirocin (BACTROBAN) 2 % ointment, Apply 1 application topically 3 (three) times a day, Disp: , Rfl:     Omega-3 Fatty Acids (FISH OIL) 1,000 mg, Take 1,000 mg by mouth 3 (three) times a day, Disp: , Rfl:     QUEtiapine (SEROquel XR) 50 mg, , Disp: , Rfl:     QUEtiapine (SEROquel) 50 mg tablet, Take 100 mg by mouth daily at bedtime, Disp: , Rfl:     Refresh Tears 0.5 % SOLN, , Disp: , Rfl:     sertraline (ZOLOFT) 100 mg tablet, Take 100 mg by mouth daily, Disp: , Rfl:     sertraline (ZOLOFT) 50 mg tablet, Take 50 mg by mouth daily at bedtime, Disp: , Rfl:     triamcinolone (KENALOG) 0.1 % cream, Apply topically 2 (two) times a day as needed (ezcema), Disp: , Rfl:     acetaminophen (TYLENOL) 325 mg tablet, Take 650 mg by mouth every 6 (six) hours as needed for mild pain (Patient not taking: Reported on 12/8/2023), Disp: , Rfl:     acetaminophen (TYLENOL) 650 mg CR tablet, Take 1 tablet (650 mg total) by mouth every 8 (eight) hours as needed for mild pain (Patient not taking: Reported on 12/8/2023), Disp: 90 tablet, Rfl: 1    HYDROCORTISONE PO, Take 5 mg by mouth daily (Patient not taking: Reported on 8/29/2022), Disp: , Rfl:     ibuprofen (MOTRIN) 200 mg tablet, Take 200 mg by mouth every 6 (six) hours as needed for mild pain (Patient not taking: Reported on 12/8/2023), Disp: , Rfl:     Levothyroxine Sodium (SYNTHROID PO), Take 224 mcg by mouth daily (Patient not taking: Reported on 8/29/2022), Disp: , Rfl:     Allergies   Allergen Reactions    Haldol [Haloperidol] Other (See Comments)     dyskenesia    Thioridazine        Physical Exam:    /88   Pulse 71   Ht 5' 8" (1.727 m)   Wt 105 kg (232 lb 6.4 oz)   BMI 35.34 kg/m²   normal, well developed, well nourished, alert, in no distress and non-toxic and no overt pain behavior. and obese  Constitutional:normal, well developed, well nourished, alert, in no distress and non-toxic and no overt pain behavior. and obese  Eyes:anicteric  HEENT:grossly intact  Neck:supple, symmetric, trachea midline and no masses   Pulmonary:even and unlabored  Cardiovascular:No edema or pitting edema present  Skin:Normal without rashes or lesions and well hydrated  Psychiatric:Mood and affect appropriate  Neurologic:Cranial Nerves II-XII grossly intact  Musculoskeletal:normal      This document was created using speech voice recognition software. Grammatical errors, random word insertions, pronoun errors, and incomplete sentences are an occasional consequence of this system due to software limitations, ambient noise, and hardware issues. Any formal questions or concerns about content, text, or information contained within the body of this dictation should be directly addressed to the provider for clarification.

## 2024-02-04 ENCOUNTER — APPOINTMENT (EMERGENCY)
Dept: RADIOLOGY | Facility: HOSPITAL | Age: 44
End: 2024-02-04
Payer: MEDICARE

## 2024-02-04 ENCOUNTER — HOSPITAL ENCOUNTER (EMERGENCY)
Facility: HOSPITAL | Age: 44
Discharge: HOME | End: 2024-02-05
Attending: EMERGENCY MEDICINE
Payer: MEDICARE

## 2024-02-04 DIAGNOSIS — R93.89 ABNORMAL CT SCAN: ICD-10-CM

## 2024-02-04 DIAGNOSIS — Z86.59 HISTORY OF BIPOLAR DISORDER: Primary | ICD-10-CM

## 2024-02-04 PROCEDURE — 71045 X-RAY EXAM CHEST 1 VIEW: CPT

## 2024-02-04 PROCEDURE — 81025 URINE PREGNANCY TEST: CPT | Performed by: EMERGENCY MEDICINE

## 2024-02-04 PROCEDURE — 82075 ASSAY OF BREATH ETHANOL: CPT | Performed by: EMERGENCY MEDICINE

## 2024-02-04 PROCEDURE — 99284 EMERGENCY DEPT VISIT MOD MDM: CPT | Performed by: EMERGENCY MEDICINE

## 2024-02-04 PROCEDURE — 74018 RADEX ABDOMEN 1 VIEW: CPT

## 2024-02-04 PROCEDURE — 99284 EMERGENCY DEPT VISIT MOD MDM: CPT

## 2024-02-04 NOTE — Clinical Note
accompanied Yaritza Dunne to the emergency department on 2/4/2024.    Return date if applicable:         If you have any questions or concerns, please don't hesitate to call.      Marvin Lobo MD
 accompanied Yaritza Dunne to the emergency department on 2/4/2024.    Return date if applicable: 02/06/2024        If you have any questions or concerns, please don't hesitate to call.      Marvin Lobo MD
Mira Johnson accompanied Yaritza Dunne to the emergency department on 2/4/2024.    Return date if applicable: 02/06/2024        If you have any questions or concerns, please don't hesitate to call.      Marvin Lobo MD
Mira Johnson accompanied Yaritza Dunne to the emergency department on 2/4/2024.    Return date if applicable: 02/06/2024        If you have any questions or concerns, please don't hesitate to call.      Marvin Lobo MD
ct guided right paraspinal mass bx

## 2024-02-05 ENCOUNTER — APPOINTMENT (EMERGENCY)
Dept: CT IMAGING | Facility: HOSPITAL | Age: 44
End: 2024-02-05
Payer: MEDICARE

## 2024-02-05 VITALS
RESPIRATION RATE: 18 BRPM | OXYGEN SATURATION: 96 % | TEMPERATURE: 99 F | HEART RATE: 83 BPM | DIASTOLIC BLOOD PRESSURE: 63 MMHG | SYSTOLIC BLOOD PRESSURE: 128 MMHG

## 2024-02-05 LAB
AMPHETAMINES SERPL QL SCN: NEGATIVE
BARBITURATES UR QL: NEGATIVE
BENZODIAZ UR QL: NEGATIVE
COCAINE UR QL: NEGATIVE
ETHANOL EXG-MCNC: 0 MG/DL
EXT PREGNANCY TEST URINE: NEGATIVE
EXT. CONTROL: NORMAL
FLUAV RNA RESP QL NAA+PROBE: NEGATIVE
FLUBV RNA RESP QL NAA+PROBE: NEGATIVE
GLUCOSE SERPL-MCNC: 84 MG/DL (ref 65–140)
METHADONE UR QL: NEGATIVE
OPIATES UR QL SCN: NEGATIVE
OXYCODONE+OXYMORPHONE UR QL SCN: NEGATIVE
PCP UR QL: NEGATIVE
RSV RNA RESP QL NAA+PROBE: NEGATIVE
SARS-COV-2 RNA RESP QL NAA+PROBE: NEGATIVE
THC UR QL: NEGATIVE

## 2024-02-05 PROCEDURE — 82948 REAGENT STRIP/BLOOD GLUCOSE: CPT

## 2024-02-05 PROCEDURE — 74176 CT ABD & PELVIS W/O CONTRAST: CPT

## 2024-02-05 PROCEDURE — 0241U HB NFCT DS VIR RESP RNA 4 TRGT: CPT | Performed by: EMERGENCY MEDICINE

## 2024-02-05 PROCEDURE — NC001 PR NO CHARGE: Performed by: EMERGENCY MEDICINE

## 2024-02-05 PROCEDURE — 71250 CT THORAX DX C-: CPT

## 2024-02-05 PROCEDURE — 80307 DRUG TEST PRSMV CHEM ANLYZR: CPT | Performed by: EMERGENCY MEDICINE

## 2024-02-05 PROCEDURE — 96372 THER/PROPH/DIAG INJ SC/IM: CPT

## 2024-02-05 RX ORDER — DIVALPROEX SODIUM 250 MG/1
1000 TABLET, DELAYED RELEASE ORAL EVERY EVENING
Status: DISCONTINUED | OUTPATIENT
Start: 2024-02-05 | End: 2024-02-05 | Stop reason: HOSPADM

## 2024-02-05 RX ORDER — LEVOTHYROXINE SODIUM 112 UG/1
224 TABLET ORAL
Status: DISCONTINUED | OUTPATIENT
Start: 2024-02-05 | End: 2024-02-05 | Stop reason: HOSPADM

## 2024-02-05 RX ORDER — ARIPIPRAZOLE 5 MG/1
15 TABLET ORAL DAILY
Status: DISCONTINUED | OUTPATIENT
Start: 2024-02-05 | End: 2024-02-05 | Stop reason: HOSPADM

## 2024-02-05 RX ORDER — FUROSEMIDE 40 MG/1
20 TABLET ORAL DAILY
Status: DISCONTINUED | OUTPATIENT
Start: 2024-02-05 | End: 2024-02-05 | Stop reason: HOSPADM

## 2024-02-05 RX ORDER — HYDROCORTISONE 10 MG/1
5 TABLET ORAL DAILY
Status: DISCONTINUED | OUTPATIENT
Start: 2024-02-05 | End: 2024-02-05 | Stop reason: HOSPADM

## 2024-02-05 RX ORDER — HYDROCORTISONE 10 MG/1
15 TABLET ORAL
Status: DISCONTINUED | OUTPATIENT
Start: 2024-02-05 | End: 2024-02-05 | Stop reason: HOSPADM

## 2024-02-05 RX ORDER — CLONAZEPAM 0.5 MG/1
1 TABLET ORAL DAILY
Status: DISCONTINUED | OUTPATIENT
Start: 2024-02-05 | End: 2024-02-05 | Stop reason: HOSPADM

## 2024-02-05 RX ORDER — LEVOTHYROXINE SODIUM 112 UG/1
TABLET ORAL DAILY
COMMUNITY

## 2024-02-05 RX ORDER — CLONAZEPAM 0.5 MG/1
0.5 TABLET ORAL
Status: DISCONTINUED | OUTPATIENT
Start: 2024-02-05 | End: 2024-02-05 | Stop reason: HOSPADM

## 2024-02-05 RX ORDER — LEVOTHYROXINE SODIUM 112 UG/1
336 TABLET ORAL
Status: DISCONTINUED | OUTPATIENT
Start: 2024-02-10 | End: 2024-02-05 | Stop reason: HOSPADM

## 2024-02-05 RX ADMIN — TRIMETHOBENZAMIDE HYDROCHLORIDE 200 MG: 100 INJECTION INTRAMUSCULAR at 05:36

## 2024-02-05 RX ADMIN — HYDROCORTISONE 15 MG: 10 TABLET ORAL at 06:40

## 2024-02-05 RX ADMIN — LEVOTHYROXINE SODIUM 224 MCG: 112 TABLET ORAL at 06:40

## 2024-02-05 RX ADMIN — ARIPIPRAZOLE 15 MG: 5 TABLET ORAL at 08:53

## 2024-02-05 RX ADMIN — FUROSEMIDE 20 MG: 40 TABLET ORAL at 08:10

## 2024-02-05 RX ADMIN — SERTRALINE HYDROCHLORIDE 100 MG: 50 TABLET ORAL at 08:10

## 2024-02-05 NOTE — ED NOTES
Patient transitioned from physical 1:1 to virtual monitoring per ED provider.      Nita Tate, BYRON  02/05/24 0882

## 2024-02-05 NOTE — DISCHARGE INSTRUCTIONS
There is an abnormality in the pancreas on the CT scan that requires follow-up imaging as an outpatient for further evaluation.  Talk to your doctor about arranging this.  Continue your medications as prescribed, and follow-up with your psychiatrist for medication adjustment.    CT IMPRESSION:     1.  No radiopaque abnormality resembling a thumbtack  2.  Possible lesion within the pancreatic body. Suggest follow-up MRI. However, given current history, chest and abdominal x-rays should be performed prior. Alternatively, multiphase CT can be performed  3.  Abnormal appearance of the skeletal system as described. The appearance is not typical of any syndromic abnormality. This may be a result of a prior period of malnutrition/deficiency within adolescence, followed by normal period, which has resulted   in a dysmorphic portion of bone adjacent to more normal-appearing bone

## 2024-02-05 NOTE — ED NOTES
This writer discussed the patients current presentation and recommended discharge plan with .  They agree with the patient being discharged at this time. Pt has out patient services through [facility] and will follow up with [facility].      The patient was Instructed to follow up with their psychiatrist and therapist.     This writer and the patient completed a safety plan.  The patient was provided with a copy of their safety plan with encouragement to utilize the plan following discharge.     In addition, the patient was instructed to call local [facility] crisis, other crisis services, 1 or to go to the nearest ER immediately if their situation changes at any time.     This writer discussed discharge plans with the patient and group home staff, who agrees with and understands the discharge plans.       SAFETY PLAN  Warning Signs (thoughts, images, mood, behavior, situations) of a potential crisis: situations w/room mate    Coping Skills (what can I do to take my mind off the problem, or to keep myself safe): speak to staff / therapist    Outside Support (who can I reach out to for support and help): therapist, group home staff, psychiatrist    National Suicide Prevention Hotline:  [address] [place] [address] [phone] - Crisis   [facility] [phone] - LVF Crisis/Mobile Crisis   [phone] - [facility] Crisis   [facility]: [phone]  [facility]: [phone]   [facility] [phone] - Crisis   [facility] [phone] - Crisis     [facility] [phone] - Crisis   [facility] [phone] - Crisis   [phone] - Peer Support Talk Line (1-9pm daily)  [phone] - Teen Support Talk Line (1-9pm daily)  [phone] - [facility]   [facility] [phone]- Crisis    [facility] [phone] - Crisis   [facility] [phone] - Crisis    [facility]) [phone] - [facility] Crisis     TDS, CW

## 2024-02-05 NOTE — ED PROVIDER NOTES
Care of patient assumed from Dr. Lobo.  For full details, please see his note.  Briefly, this is a 43-year-old female who got upset that her psychiatric medications were not working and says she ate a thumbtack.  Imaging was performed and did not show any ingested foreign body.  She is stating that she needs for medication adjustments and is requesting to speak with crisis in the morning.  She does not currently meet criteria for 302.    She was seen by crisis.  She is not sending to a 1 and will be returned to her group home.  She was advised to follow-up with her psychiatrist for further medication adjustment.  She did also have abnormal pancreatic lesion on her CT scans and was advised to follow-up with her PCP for further evaluation of this as an outpatient.     Elis Finn MD  02/05/24 [id number]

## 2024-02-05 NOTE — ED NOTES
Transportation being arranged for patient to return to group home.     Nita Schwartz RN  02/05/24 [id number]

## 2024-02-05 NOTE — ED NOTES
"Pt presents to the ED from group home as a self referral. Pt denies SI's / HI's and or AVH's. Pt states, \"I just need to go upstairs for a few days in a nice comfy bed and relax.\" Pt reports having a headache, stomachache and nauseau. Pt confirms residency at [facility]. Pt cannot tell this writer how long pt has been there or the name of psychiatrist. Pt reports, \"My roommate has been screaming alot lately and waking me up.\" Pt does state, \"I used to take 2 pills at 3pm and now I take 3.\" Pt is calm, cooperative and engages in conversation. Pt does appear to go from topic to topic but no concerns shared which warrant criteria for IP BH tx. CW placed call to Bernice of [facility] ([place], [place]). CW and Bernice discussed pt returning to group home and follow up w/Psychiatrist regarding medication. Bernice is in agreement w/pt returning to group home. Bernice will work on setting up transportation. [facility] will send someone to pick pt up. CW provided Dr. Godfrey w/details.     TDS, CW  "

## 2024-02-05 NOTE — ED PROVIDER NOTES
"History  Chief Complaint   Patient presents with    Medication Problem     Pt comes with c/c that her psychiatric medication is not working. She states she had a screaming and yelling episode at the group home, she states she ate a thumbtack but took the sharp point out before chewing it. Denies self harm, HI Currently residing at [facility]. Feels like medication is making her angry and raising her blood pressure. Unable to sleep x2 days      43-year-old female presents to the emergency room with a chief complaint of \"my medicine's not working,\"    Patient states tonight, \"I got mad\" and states that \"I swallowed a thumbtack.\"  Patient denies any intent to harm herself though I discussed with the patient in detail that this could harm her.  Patient states that she bit off the metallic piece, swallowing that and spit out the plastic piece.    Despite this, patient continues to deny intent to harm herself.  Patient denies intent to harm others though she affirms getting angry.    Patient is unclear when her last medication change was and has not talked to psychiatry at her group home about adjusting her medications.    Impression and plan: 1.)  Anger with a history of known bipolar disorder.  Patient did swallow a thumbtack though she denies any intent to harm herself despite the action.  Patient is a resident of a group home, will reassess need for continued evaluation regarding this after evaluation of her swallowed foreign body. 2.)  Reported swallowed foreign body, will obtain plain film imaging to evaluate for retention.  Patient states this occurred a few hours ago and it does represent a potentially sharp object.  Will monitor and reassess need for additional testing and consultation.        Prior to Admission Medications   Prescriptions Last Dose Informant Patient Reported? Taking?   ARIPiprazole (ABILIFY) 15 mg tablet  Care Giver, Self Yes Yes   Sig: Take 15 mg by mouth daily   Calcium Carb-Cholecalciferol " (CALCIUM 600-D PO)  Self, Care Giver Yes No   Calcium Carbonate-Vitamin D 500-125 MG-UNIT TABS Not Taking Care Giver, Self Yes No   Sig: Take 1 tablet by mouth 2 (two) times a day   Patient not taking: Reported on 2/5/2024   Cholecalciferol (VITAMIN D) 50 MCG (2000 UT) tablet Not Taking Care Giver, Self Yes No   Sig: Take 2,000 Units by mouth daily   Patient not taking: Reported on 2/5/2024   HYDROCORTISONE PO  Care Giver, Self Yes Yes   Sig: Take 15 mg by mouth daily with breakfast   Multiple Vitamin (MULTIVITAMIN) tablet  Care Giver, Self Yes Yes   Sig: Take 1 tablet by mouth daily   Omega-3 Fatty Acids (FISH OIL) 1,000 mg Not Taking Care Giver, Self Yes No   Sig: Take 1,000 mg by mouth 3 (three) times a day   Patient not taking: Reported on 2/5/2024   QUEtiapine (SEROquel XR) 50 mg Not Taking Self, Care Giver Yes No   Patient not taking: Reported on 2/5/2024   QUEtiapine (SEROquel) 50 mg tablet Not Taking Care Giver, Self Yes No   Sig: Take 100 mg by mouth daily at bedtime   Patient not taking: Reported on 2/5/2024   Refresh Tears 0.5 % SOLN  Self, Care Giver Yes Yes   acetaminophen (TYLENOL) 325 mg tablet  Care Giver, Self Yes Yes   Sig: Take 650 mg by mouth every 6 (six) hours as needed for mild pain   albuterol (PROVENTIL HFA,VENTOLIN HFA) 90 mcg/act inhaler  Care Giver, Self Yes Yes   Sig: Inhale 2 puffs every 6 (six) hours as needed for wheezing   clonazePAM (KlonoPIN) 0.5 mg tablet  Care Giver, Self Yes Yes   Sig: Take 0.5 mg by mouth daily at bedtime   clonazePAM (KlonoPIN) 1 mg tablet  Care Giver, Self Yes Yes   Sig: Take 1 mg by mouth daily Daily @ 3pm.   divalproex sodium (DEPAKOTE ER) 500 mg 24 hr tablet  Care Giver, Self Yes Yes   Sig: Take 1,000 mg by mouth daily at bedtime   docusate sodium (COLACE) 100 mg capsule  Care Giver, Self Yes Yes   Sig: Take 100 mg by mouth daily   furosemide (LASIX) 20 mg tablet  Care Giver, Self Yes Yes   Sig: Take 20 mg by mouth daily   hydrocortisone (CORTEF) 10 mg  tablet  Self, Care Giver Yes Yes   Sig: 10 mg daily Take one half (0.5) tablets by mouth daily at 3pm.   ibuprofen (MOTRIN) 200 mg tablet  Care Giver, Self Yes Yes   Sig: Take 200 mg by mouth every 6 (six) hours as needed for mild pain   levothyroxine 112 mcg tablet  Care Giver, Self Yes Yes   Sig: Take by mouth Take 3 tablets by mouth twice a week on Saturday and Sunday.   levothyroxine 112 mcg tablet   Yes Yes   Sig: Take by mouth daily Take 2 tablets by mouth Monday - Friday.   lidocaine (Lidoderm) 5 %  Self, Care Giver No Yes   Sig: Apply 1 patch topically over 12 hours daily as needed (pain) Remove & Discard patch within 12 hours or as directed by MD   meclizine (ANTIVERT) 25 mg tablet  Care Giver, Self Yes Yes   Sig: Take 25 mg by mouth every 12 (twelve) hours as needed for dizziness   meloxicam (MOBIC) 15 mg tablet  Self, Care Giver No Yes   Sig: Take 1 tablet (15 mg total) by mouth daily as needed for moderate pain   mupirocin (BACTROBAN) 2 % ointment  Care Giver, Self Yes Yes   Sig: Apply 1 application topically 3 (three) times a day   sertraline (ZOLOFT) 100 mg tablet  Care Giver, Self Yes Yes   Sig: Take 100 mg by mouth daily   sertraline (ZOLOFT) 50 mg tablet  Care Giver, Self Yes Yes   Sig: Take 50 mg by mouth daily at bedtime   triamcinolone (KENALOG) 0.1 % cream  Care Giver, Self Yes Yes   Sig: Apply topically 2 (two) times a day as needed (ezcema)      Facility-Administered Medications: None       Past Medical History:   Diagnosis Date    Adrenocortical insufficiency (HCC)     Disease of thyroid gland     hypo    Disorder of bone density and structure, unspecified     Epilepsy (HCC)     Hyperlipidemia     Hypopituitarism (HCC)     Mild intellectual disabilities     Psychiatric disorder     bipolar       Past Surgical History:   Procedure Laterality Date    EPIDURAL BLOCK INJECTION N/A 8/23/2023    Procedure: L3-L4  LUMBAR epidural steroid injection (74710);  Surgeon: Adam Hayden, DO;   Location: [facility] MAIN OR;  Service: Pain Management        No family history on file.  I have reviewed and agree with the history as documented.    E-Cigarette/Vaping    E-Cigarette Use Never User      E-Cigarette/Vaping Substances    Nicotine No     THC No     CBD No     Flavoring No     Other No     Unknown No      Social History     Tobacco Use    Smoking status: Never    Smokeless tobacco: Never   Vaping Use    Vaping status: Never Used   Substance Use Topics    Alcohol use: Not Currently    Drug use: Not Currently       Review of Systems    Physical Exam  Physical Exam  Vitals reviewed.   Cardiovascular:      Rate and Rhythm: Normal rate.   Abdominal:      General: There is no distension.      Tenderness: There is abdominal tenderness. There is no guarding or rebound.   Musculoskeletal:         General: No deformity.      Cervical back: Neck supple.   Skin:     General: Skin is warm and dry.   Neurological:      Mental Status: She is alert.   Psychiatric:         Mood and Affect: Affect is flat.         Speech: Speech is delayed.         Behavior: Behavior is slowed. Behavior is cooperative.         Thought Content: Thought content does not include homicidal or suicidal ideation. Thought content does not include homicidal or suicidal plan.         Vital Signs  ED Triage Vitals [02/04/24 2155]   Temperature Pulse Respirations Blood Pressure SpO2   98 °F (36.7 °C) 77 18 134/90 98 %      Temp Source Heart Rate Source Patient Position - Orthostatic VS BP Location FiO2 (%)   Temporal Monitor Sitting Left arm --      Pain Score       --           Vitals:    02/04/24 2155 02/05/24 0249 02/05/24 0730 02/05/24 0931   BP: 134/90 125/84 121/73 128/63   Pulse: 77 66 74 83   Patient Position - Orthostatic VS: Sitting Sitting           Visual Acuity      ED Medications  Medications   trimethobenzamide (TIGAN) IM injection 200 mg (200 mg Intramuscular Given 2/5/24 0536)       Diagnostic Studies  Results Reviewed        Procedure Component Value Units Date/Time    FLU/RSV/COVID - if FLU/RSV clinically relevant [[id number]]  (Normal) Collected: 02/05/24 0536    Lab Status: Final result Specimen: Nares from Nose Updated: 02/05/24 0620     SARS-CoV-2 Negative     INFLUENZA A PCR Negative     INFLUENZA B PCR Negative     RSV PCR Negative    Narrative:      FOR PEDIATRIC PATIENTS - copy/paste COVID Guidelines URL to browser: [email or web address]    SARS-CoV-2 assay is a Nucleic Acid Amplification assay intended for the  qualitative detection of nucleic acid from SARS-CoV-2 in nasopharyngeal  swabs. Results are for the presumptive identification of SARS-CoV-2 RNA.    Positive results are indicative of infection with SARS-CoV-2, the virus  causing COVID-19, but do not rule out bacterial infection or co-infection  with other viruses. Laboratories within the [place] and its  territories are required to report all positive results to the appropriate  public health authorities. Negative results do not preclude SARS-CoV-2  infection and should not be used as the sole basis for treatment or other  patient management decisions. Negative results must be combined with  clinical observations, patient history, and epidemiological information.  This test has not been FDA cleared or approved.    This test has been authorized by FDA under an Emergency Use Authorization  (EUA). This test is only authorized for the duration of time the  declaration that circumstances exist justifying the authorization of the  emergency use of an in vitro diagnostic tests for detection of SARS-CoV-2  virus and/or diagnosis of COVID-19 infection under section 564(b)(1) of  the Act, 21 U.S.C. 360bbb-3(b)(1), unless the authorization is terminated  or revoked sooner. The test has been validated but independent review by FDA  and CLIA is pending.    Test performed using [employer]: This RT-PCR assay targets  N2,  a region unique to SARS-CoV-2. A conserved region in the E-gene was chosen  for pan-Sarbecovirus detection which includes SARS-CoV-2.    According to CMS-2020-01-R, this platform meets the definition of high-throughput technology.    Fingerstick Glucose (POCT) [[id number]]  (Normal) Collected: 02/05/24 0534    Lab Status: Final result Updated: 02/05/24 0535     POC Glucose 84 mg/dl     Rapid drug screen, urine [[id number]]  (Normal) Collected: 02/05/24 0021    Lab Status: Final result Specimen: Urine, Other Updated: 02/05/24 0315     Amph/Meth UR Negative     Barbiturate Ur Negative     Benzodiazepine Urine Negative     Cocaine Urine Negative     Methadone Urine Negative     Opiate Urine Negative     PCP Ur Negative     THC Urine Negative     Oxycodone Urine Negative    Narrative:      FOR MEDICAL PURPOSES ONLY.   IF CONFIRMATION NEEDED PLEASE CONTACT THE LAB WITHIN 5 DAYS.    Drug Screen Cutoff Levels:  AMPHETAMINE/METHAMPHETAMINES  1000 ng/mL  BARBITURATES     200 ng/mL  BENZODIAZEPINES     200 ng/mL  COCAINE      300 ng/mL  METHADONE      300 ng/mL  OPIATES      300 ng/mL  PHENCYCLIDINE     25 ng/mL  THC       50 ng/mL  OXYCODONE      100 ng/mL    POCT pregnancy, urine [[id number]]  (Normal) Resulted: 02/05/24 0021    Lab Status: Final result Updated: 02/05/24 0021     EXT Preg Test, Ur Negative     Control Valid    POCT alcohol breath test [[id number]]  (Normal) Resulted: 02/05/24 0001    Lab Status: Final result Updated: 02/05/24 0001     EXTBreath Alcohol 0.000                   CT chest abdomen pelvis wo contrast   Final Result by Carmelo Pollock MD (02/05 0212)      1.  No radiopaque abnormality resembling a thumbtack   2.  Possible lesion within the pancreatic body. Suggest follow-up MRI. However, given current history, chest and abdominal x-rays should be performed prior. Alternatively, multiphase CT can be performed   3.  Abnormal appearance of the skeletal system as described. The appearance is not  typical of any syndromic abnormality. This may be a result of a prior period of malnutrition/deficiency within adolescence, followed by normal period, which has resulted    in a dysmorphic portion of bone adjacent to more normal-appearing bone      This study was marked in EPIC for significant notification..            Workstation performed: [id number]         XR abdomen 1 view kub   Final Result by Donal Cardenas MD (02/05 1023)      No radiopaque foreign bodies or other acute abdominal abnormalities.               Workstation performed: [id number]         XR chest 1 view portable   Final Result by Donal Cardenas MD (02/05 1018)      No radiopaque foreign bodies or other acute cardiopulmonary findings.            Workstation performed: [id number]                    Procedures  Procedures         ED Course  ED Course as of 02/05/24 2116   Sun Feb 04, 2024   2339 Do not appreciate any clear radiopaque foreign body.  Considering significant questionable history from the patient, will obtain CT imaging to more carefully evaluate this.   Mon Feb 05, 2024   0257 Patient CT did not find any areas that would be concerning for foreign body.  Does demonstrate findings concerning for potential pancreatic lesion I discussed with the patient the need for continued follow-up and repeat imaging.    Again discussed options regarding continued evaluation and treatment with the patient.  Patient now states that she does not wish to sign into the hospital voluntarily.  I do not believe patient meets criteria for involuntary admission to the hospital though she may benefit from voluntary admission.    Patient is medically cleared for psychiatric admission if she desires to sign in all entirely.                               SBIRT 22yo+      Flowsheet Row Most Recent Value   Initial Alcohol Screen: US AUDIT-C     1. How often do you have a drink containing alcohol? 0 Filed at: 02/04/2024 2208   2. How many drinks containing  alcohol do you have on a typical day you are drinking?  0 Filed at: 02/04/2024 2203   3b. FEMALE Any Age, or MALE 65+: How often do you have 4 or more drinks on one occassion? 0 Filed at: 02/04/2024 2203   Audit-C Score 0 Filed at: 02/04/2024 2203   FABY: How many times in the past year have you...    Used an illegal drug or used a prescription medication for non-medical reasons? Never Filed at: 02/04/2024 2203                      Medical Decision Making  Amount and/or Complexity of Data Reviewed  Labs: ordered.  Radiology: ordered.    Risk  Prescription drug management.             Disposition  Final diagnoses:   History of bipolar disorder   Abnormal CT scan     Time reflects when diagnosis was documented in both MDM as applicable and the Disposition within this note       Time User Action Codes Description Comment    2/5/2024  9:58 AM Elis Finn [Z86.59] History of bipolar disorder     2/5/2024  9:59 AM Elis Finn [R93.89] Abnormal CT scan           ED Disposition       ED Disposition   Discharge    Condition   Good    Date/Time   Mon Feb 5, 2024 0956    Comment   Yaritza Dunne discharge to home/self care.             Follow-up Information       Follow up With Specialties Details Why Contact Info    Luis Felipe Hankins MD Family Medicine Schedule an appointment as soon as possible for a visit  As needed, to follow up [address] [place], [place] [id number] [phone]      your psychiatrist  Schedule an appointment as soon as possible for a visit  to follow up on further medication adjustment             Discharge Medication List as of 2/5/2024  9:59 AM        CONTINUE these medications which have NOT CHANGED    Details   acetaminophen (TYLENOL) 325 mg tablet Take 650 mg by mouth every 6 (six) hours as needed for mild pain, Historical Med      albuterol (PROVENTIL HFA,VENTOLIN HFA) 90 mcg/act inhaler Inhale 2 puffs every 6 (six) hours as needed for wheezing, Historical  Med      ARIPiprazole (ABILIFY) 15 mg tablet Take 15 mg by mouth daily, Starting Thu 3/17/2022, Historical Med      !! clonazePAM (KlonoPIN) 0.5 mg tablet Take 0.5 mg by mouth daily at bedtime, Historical Med      !! clonazePAM (KlonoPIN) 1 mg tablet Take 1 mg by mouth daily Daily @ 3pm., Historical Med      divalproex sodium (DEPAKOTE ER) 500 mg 24 hr tablet Take 1,000 mg by mouth daily at bedtime, Historical Med      docusate sodium (COLACE) 100 mg capsule Take 100 mg by mouth daily, Historical Med      furosemide (LASIX) 20 mg tablet Take 20 mg by mouth daily, Historical Med      !! hydrocortisone (CORTEF) 10 mg tablet 10 mg daily Take one half (0.5) tablets by mouth daily at 3pm., Starting Tue 6/6/2023, Historical Med      !! HYDROCORTISONE PO Take 15 mg by mouth daily with breakfast, Historical Med      ibuprofen (MOTRIN) 200 mg tablet Take 200 mg by mouth every 6 (six) hours as needed for mild pain, Historical Med      !! levothyroxine 112 mcg tablet Take by mouth Take 3 tablets by mouth twice a week on Saturday and Sunday., Historical Med      !! levothyroxine 112 mcg tablet Take by mouth daily Take 2 tablets by mouth Monday - Friday., Historical Med      lidocaine (Lidoderm) 5 % Apply 1 patch topically over 12 hours daily as needed (pain) Remove & Discard patch within 12 hours or as directed by MD, Starting Fri 9/1/2023, Normal      meclizine (ANTIVERT) 25 mg tablet Take 25 mg by mouth every 12 (twelve) hours as needed for dizziness, Historical Med      meloxicam (MOBIC) 15 mg tablet Take 1 tablet (15 mg total) by mouth daily as needed for moderate pain, Starting Thu 6/8/2023, Normal      Multiple Vitamin (MULTIVITAMIN) tablet Take 1 tablet by mouth daily, Historical Med      mupirocin (BACTROBAN) 2 % ointment Apply 1 application topically 3 (three) times a day, Historical Med      Refresh Tears 0.5 % SOLN Starting Thu 5/25/2023, Historical Med      !! sertraline (ZOLOFT) 100 mg tablet Take 100 mg by mouth  daily, Historical Med      !! sertraline (ZOLOFT) 50 mg tablet Take 50 mg by mouth daily at bedtime, Historical Med      triamcinolone (KENALOG) 0.1 % cream Apply topically 2 (two) times a day as needed (ezcema), Historical Med      Calcium Carb-Cholecalciferol (CALCIUM 600-D PO) Starting Tue 6/6/2023, Historical Med      Calcium Carbonate-Vitamin D 500-125 MG-UNIT TABS Take 1 tablet by mouth 2 (two) times a day, Historical Med      Cholecalciferol (VITAMIN D) 50 MCG (2000 UT) tablet Take 2,000 Units by mouth daily, Historical Med      Omega-3 Fatty Acids (FISH OIL) 1,000 mg Take 1,000 mg by mouth 3 (three) times a day, Historical Med      QUEtiapine (SEROquel XR) 50 mg Starting Tue 6/6/2023, Historical Med      QUEtiapine (SEROquel) 50 mg tablet Take 100 mg by mouth daily at bedtime, Historical Med       !! - Potential duplicate medications found. Please discuss with provider.          No discharge procedures on file.    PDMP Review       None            ED Provider  Electronically Signed by             Marvin Lobo MD  02/05/24 4095

## 2024-02-21 ENCOUNTER — APPOINTMENT (EMERGENCY)
Dept: RADIOLOGY | Facility: HOSPITAL | Age: 44
End: 2024-02-21
Payer: MEDICARE

## 2024-02-21 ENCOUNTER — HOSPITAL ENCOUNTER (EMERGENCY)
Facility: HOSPITAL | Age: 44
End: 2024-02-22
Attending: EMERGENCY MEDICINE
Payer: MEDICARE

## 2024-02-21 DIAGNOSIS — S90.456A: ICD-10-CM

## 2024-02-21 DIAGNOSIS — S00.91XA: ICD-10-CM

## 2024-02-21 DIAGNOSIS — S69.92XA WRIST INJURY, LEFT, INITIAL ENCOUNTER: ICD-10-CM

## 2024-02-21 DIAGNOSIS — Z00.8 MEDICAL CLEARANCE FOR PSYCHIATRIC ADMISSION: ICD-10-CM

## 2024-02-21 DIAGNOSIS — S10.91XA: ICD-10-CM

## 2024-02-21 DIAGNOSIS — T14.91XA SUICIDAL BEHAVIOR WITH ATTEMPTED SELF-INJURY (HCC): Primary | ICD-10-CM

## 2024-02-21 LAB
EXT PREGNANCY TEST URINE: NEGATIVE
EXT. CONTROL: NORMAL

## 2024-02-21 PROCEDURE — 96372 THER/PROPH/DIAG INJ SC/IM: CPT

## 2024-02-21 PROCEDURE — 90471 IMMUNIZATION ADMIN: CPT

## 2024-02-21 PROCEDURE — 73110 X-RAY EXAM OF WRIST: CPT

## 2024-02-21 PROCEDURE — 73502 X-RAY EXAM HIP UNI 2-3 VIEWS: CPT

## 2024-02-21 PROCEDURE — 99285 EMERGENCY DEPT VISIT HI MDM: CPT

## 2024-02-21 PROCEDURE — 81025 URINE PREGNANCY TEST: CPT | Performed by: EMERGENCY MEDICINE

## 2024-02-21 PROCEDURE — 73130 X-RAY EXAM OF HAND: CPT

## 2024-02-21 PROCEDURE — 99285 EMERGENCY DEPT VISIT HI MDM: CPT | Performed by: EMERGENCY MEDICINE

## 2024-02-21 PROCEDURE — 73630 X-RAY EXAM OF FOOT: CPT

## 2024-02-21 PROCEDURE — 90715 TDAP VACCINE 7 YRS/> IM: CPT | Performed by: EMERGENCY MEDICINE

## 2024-02-21 RX ORDER — GINSENG 100 MG
1 CAPSULE ORAL ONCE
Status: COMPLETED | OUTPATIENT
Start: 2024-02-21 | End: 2024-02-21

## 2024-02-21 RX ORDER — ONDANSETRON 4 MG/1
4 TABLET, ORALLY DISINTEGRATING ORAL ONCE
Status: COMPLETED | OUTPATIENT
Start: 2024-02-21 | End: 2024-02-21

## 2024-02-21 RX ADMIN — ONDANSETRON 4 MG: 4 TABLET, ORALLY DISINTEGRATING ORAL at 23:13

## 2024-02-21 RX ADMIN — TETANUS TOXOID, REDUCED DIPHTHERIA TOXOID AND ACELLULAR PERTUSSIS VACCINE, ADSORBED 0.5 ML: 5; 2.5; 8; 8; 2.5 SUSPENSION INTRAMUSCULAR at 22:24

## 2024-02-21 RX ADMIN — BACITRACIN ZINC 1 LARGE APPLICATION: 500 OINTMENT TOPICAL at 22:27

## 2024-02-21 NOTE — LETTER
[facility] EMERGENCY DEPARTMENT  [address]  [place] [address]  Dept: [phone]      EMTALA TRANSFER CONSENT    NAME Yaritza Dunne                             1980                              MRN [id number]    I have been informed of my rights regarding examination, treatment, and transfer   by Dr. Dee Hernandez DO    Benefits: Specialized equipment and/or services available at the receiving facility (Include comment)________________________    Risks: Potential for delay in receiving treatment      Consent for Transfer:  I acknowledge that my medical condition has been evaluated and explained to me by the emergency department physician or other qualified medical person and/or my attending physician, who has recommended that I be transferred to the service of  Accepting Physician: Dr. Zimmerman at Accepting Facility Name, City & State : [address] [address]. The above potential benefits of such transfer, the potential risks associated with such transfer, and the probable risks of not being transferred have been explained to me, and I fully understand them.  The doctor has explained that, in my case, the benefits of transfer outweigh the risks.  I agree to be transferred.    I authorize the performance of emergency medical procedures and treatments upon me in both transit and upon arrival at the receiving facility.  Additionally, I authorize the release of any and all medical records to the receiving facility and request they be transported with me, if possible.  I understand that the safest mode of transportation during a medical emergency is an ambulance and that the Hospital advocates the use of this mode of transport. Risks of traveling to the receiving facility by car, including absence of medical control, life sustaining equipment, such as oxygen, and medical personnel has been explained to me and I fully understand them.    (MJ CORRECT  BOX BELOW)  [X]  I consent to the stated transfer and to be transported by ambulance/helicopter.  [  ]  I consent to the stated transfer, but refuse transportation by ambulance and accept full responsibility for my transportation by car.  I understand the risks of non-ambulance transfers and I exonerate the Hospital and its staff from any deterioration in my condition that results from this refusal.    X___________________________________________    DATE  24  TIME________  Signature of patient or legally responsible individual signing on patient behalf           RELATIONSHIP TO PATIENT_________________________                  Provider Certification    NAME Yaritza Dunne                             1980                              MRN [id number]    A medical screening exam was performed on the above named patient.  Based on the examination:    Condition Necessitating Transfer The primary encounter diagnosis was Suicidal behavior with attempted self-injury (HCC). Diagnoses of Foreign body of toe, Wrist injury, left, initial encounter, Abrasion of multiple sites of head and neck, and Medical clearance for psychiatric admission were also pertinent to this visit.    Patient Condition: The patient has been stabilized such that within reasonable medical probability, no material deterioration of the patient condition or the condition of the unborn child(elfego) is likely to result from the transfer    Reason for Transfer: Level of Care needed not available at this facility    Transfer Requirements: Facility -[address], [address] [place] [address]   Space available and qualified personnel available for treatment as acknowledged by LEXA Faulkner, [phone]  Agreed to accept transfer and to provide appropriate medical treatment as acknowledged by       Dr. Zimmerman  Appropriate medical records of the examination and treatment of the patient are provided at the time of transfer   STAFF INITIAL WHEN  COMPLETED _______  Transfer will be performed by qualified personnel from              and appropriate transfer equipment as required, including the use of necessary and appropriate life support measures.    Provider Certification: I have examined the patient and explained the following risks and benefits of being transferred/refusing transfer to the patient/family:         Based on these reasonable risks and benefits to the patient and/or the unborn child(elfego), and based upon the information available at the time of the patient’s examination, I certify that the medical benefits reasonably to be expected from the provision of appropriate medical treatments at another medical facility outweigh the increasing risks, if any, to the individual’s medical condition, and in the case of labor to the unborn child, from effecting the transfer.    X____________________________________________ DATE 02/22/24        TIME_______      ORIGINAL - SEND TO MEDICAL RECORDS   COPY - SEND WITH PATIENT DURING TRANSFER

## 2024-02-22 ENCOUNTER — HOSPITAL ENCOUNTER (INPATIENT)
Facility: HOSPITAL | Age: 44
LOS: 6 days | Discharge: HOME | DRG: 885 | End: 2024-02-28
Attending: PSYCHIATRY & NEUROLOGY | Admitting: STUDENT IN AN ORGANIZED HEALTH CARE EDUCATION/TRAINING PROGRAM
Payer: MEDICARE

## 2024-02-22 VITALS
SYSTOLIC BLOOD PRESSURE: 130 MMHG | DIASTOLIC BLOOD PRESSURE: 75 MMHG | RESPIRATION RATE: 18 BRPM | TEMPERATURE: 98.1 F | OXYGEN SATURATION: 100 % | HEART RATE: 86 BPM

## 2024-02-22 DIAGNOSIS — Z00.8 MEDICAL CLEARANCE FOR PSYCHIATRIC ADMISSION: ICD-10-CM

## 2024-02-22 DIAGNOSIS — F63.81 INTERMITTENT EXPLOSIVE DISORDER: ICD-10-CM

## 2024-02-22 DIAGNOSIS — F39 UNSPECIFIED MOOD (AFFECTIVE) DISORDER (HCC): ICD-10-CM

## 2024-02-22 DIAGNOSIS — E56.9 VITAMIN DEFICIENCY: ICD-10-CM

## 2024-02-22 DIAGNOSIS — K21.9 GASTROESOPHAGEAL REFLUX DISEASE WITHOUT ESOPHAGITIS: ICD-10-CM

## 2024-02-22 DIAGNOSIS — E27.40 ADRENOCORTICAL INSUFFICIENCY (HCC): ICD-10-CM

## 2024-02-22 DIAGNOSIS — S91.109A OPEN TOE WOUND: Primary | ICD-10-CM

## 2024-02-22 DIAGNOSIS — E03.9 HYPOTHYROIDISM, UNSPECIFIED TYPE: ICD-10-CM

## 2024-02-22 LAB
AMPHETAMINES SERPL QL SCN: NEGATIVE
BARBITURATES UR QL: NEGATIVE
BENZODIAZ UR QL: NEGATIVE
COCAINE UR QL: NEGATIVE
ETHANOL EXG-MCNC: 0 MG/DL
METHADONE UR QL: NEGATIVE
OPIATES UR QL SCN: NEGATIVE
OXYCODONE+OXYMORPHONE UR QL SCN: NEGATIVE
PCP UR QL: NEGATIVE
THC UR QL: NEGATIVE

## 2024-02-22 PROCEDURE — GZHZZZZ GROUP PSYCHOTHERAPY: ICD-10-PCS | Performed by: STUDENT IN AN ORGANIZED HEALTH CARE EDUCATION/TRAINING PROGRAM

## 2024-02-22 PROCEDURE — 82075 ASSAY OF BREATH ETHANOL: CPT | Performed by: EMERGENCY MEDICINE

## 2024-02-22 PROCEDURE — 80307 DRUG TEST PRSMV CHEM ANLYZR: CPT | Performed by: EMERGENCY MEDICINE

## 2024-02-22 PROCEDURE — GZ59ZZZ INDIVIDUAL PSYCHOTHERAPY, PSYCHOPHYSIOLOGICAL: ICD-10-PCS | Performed by: STUDENT IN AN ORGANIZED HEALTH CARE EDUCATION/TRAINING PROGRAM

## 2024-02-22 RX ORDER — MAGNESIUM HYDROXIDE/ALUMINUM HYDROXICE/SIMETHICONE 120; 1200; 1200 MG/30ML; MG/30ML; MG/30ML
30 SUSPENSION ORAL EVERY 4 HOURS PRN
Status: DISCONTINUED | OUTPATIENT
Start: 2024-02-22 | End: 2024-02-28 | Stop reason: HOSPADM

## 2024-02-22 RX ORDER — ACETAMINOPHEN 325 MG/1
650 TABLET ORAL ONCE
Status: COMPLETED | OUTPATIENT
Start: 2024-02-22 | End: 2024-02-22

## 2024-02-22 RX ORDER — HYDROCORTISONE 10 MG/1
15 TABLET ORAL
Status: CANCELLED | OUTPATIENT
Start: 2024-02-23

## 2024-02-22 RX ORDER — DIVALPROEX SODIUM 500 MG/1
1000 TABLET, EXTENDED RELEASE ORAL
Status: DISCONTINUED | OUTPATIENT
Start: 2024-02-22 | End: 2024-02-24

## 2024-02-22 RX ORDER — POLYETHYLENE GLYCOL 3350 17 G/17G
17 POWDER, FOR SOLUTION ORAL DAILY PRN
Status: DISCONTINUED | OUTPATIENT
Start: 2024-02-22 | End: 2024-02-28 | Stop reason: HOSPADM

## 2024-02-22 RX ORDER — LEVOTHYROXINE SODIUM 112 UG/1
336 TABLET ORAL 2 TIMES WEEKLY
Status: DISCONTINUED | OUTPATIENT
Start: 2024-02-24 | End: 2024-02-28 | Stop reason: HOSPADM

## 2024-02-22 RX ORDER — OLANZAPINE 2.5 MG/1
5 TABLET, FILM COATED ORAL
Status: CANCELLED | OUTPATIENT
Start: 2024-02-22

## 2024-02-22 RX ORDER — HYDROXYZINE HYDROCHLORIDE 25 MG/1
50 TABLET, FILM COATED ORAL
Status: CANCELLED | OUTPATIENT
Start: 2024-02-22

## 2024-02-22 RX ORDER — CLONAZEPAM 0.5 MG/1
0.5 TABLET ORAL
Status: DISCONTINUED | OUTPATIENT
Start: 2024-02-22 | End: 2024-02-23

## 2024-02-22 RX ORDER — HYDROXYZINE HYDROCHLORIDE 25 MG/1
25 TABLET, FILM COATED ORAL
Status: DISCONTINUED | OUTPATIENT
Start: 2024-02-22 | End: 2024-02-28 | Stop reason: HOSPADM

## 2024-02-22 RX ORDER — LEVOTHYROXINE SODIUM 112 UG/1
224 TABLET ORAL
Status: CANCELLED | OUTPATIENT
Start: 2024-02-23

## 2024-02-22 RX ORDER — FUROSEMIDE 40 MG/1
20 TABLET ORAL DAILY
Status: DISCONTINUED | OUTPATIENT
Start: 2024-02-22 | End: 2024-02-22 | Stop reason: HOSPADM

## 2024-02-22 RX ORDER — DIVALPROEX SODIUM 500 MG/1
1000 TABLET, EXTENDED RELEASE ORAL
Status: DISCONTINUED | OUTPATIENT
Start: 2024-02-22 | End: 2024-02-22 | Stop reason: HOSPADM

## 2024-02-22 RX ORDER — LANOLIN ALCOHOL/MO/W.PET/CERES
3 CREAM (GRAM) TOPICAL
Status: CANCELLED | OUTPATIENT
Start: 2024-02-22

## 2024-02-22 RX ORDER — LORAZEPAM 2 MG/ML
2 INJECTION INTRAMUSCULAR EVERY 6 HOURS PRN
Status: CANCELLED | OUTPATIENT
Start: 2024-02-22

## 2024-02-22 RX ORDER — LORAZEPAM 2 MG/ML
2 INJECTION INTRAMUSCULAR EVERY 6 HOURS PRN
Status: DISCONTINUED | OUTPATIENT
Start: 2024-02-22 | End: 2024-02-28 | Stop reason: HOSPADM

## 2024-02-22 RX ORDER — OLANZAPINE 10 MG/2ML
5 INJECTION, POWDER, FOR SOLUTION INTRAMUSCULAR
Status: CANCELLED | OUTPATIENT
Start: 2024-02-22

## 2024-02-22 RX ORDER — SERTRALINE HYDROCHLORIDE 100 MG/1
100 TABLET, FILM COATED ORAL DAILY
Status: DISCONTINUED | OUTPATIENT
Start: 2024-02-23 | End: 2024-02-26

## 2024-02-22 RX ORDER — HYDROXYZINE 50 MG/1
50 TABLET, FILM COATED ORAL
Status: DISCONTINUED | OUTPATIENT
Start: 2024-02-22 | End: 2024-02-28 | Stop reason: HOSPADM

## 2024-02-22 RX ORDER — LANOLIN ALCOHOL/MO/W.PET/CERES
3 CREAM (GRAM) TOPICAL
Status: DISCONTINUED | OUTPATIENT
Start: 2024-02-22 | End: 2024-02-28 | Stop reason: HOSPADM

## 2024-02-22 RX ORDER — LEVOTHYROXINE SODIUM 112 UG/1
336 TABLET ORAL 2 TIMES WEEKLY
Status: CANCELLED | OUTPATIENT
Start: 2024-02-24

## 2024-02-22 RX ORDER — OLANZAPINE 10 MG/2ML
5 INJECTION, POWDER, FOR SOLUTION INTRAMUSCULAR
Status: DISCONTINUED | OUTPATIENT
Start: 2024-02-22 | End: 2024-02-28 | Stop reason: HOSPADM

## 2024-02-22 RX ORDER — OLANZAPINE 10 MG/1
10 TABLET ORAL
Status: DISCONTINUED | OUTPATIENT
Start: 2024-02-22 | End: 2024-02-28 | Stop reason: HOSPADM

## 2024-02-22 RX ORDER — LEVOTHYROXINE SODIUM 112 UG/1
336 TABLET ORAL 2 TIMES WEEKLY
Status: DISCONTINUED | OUTPATIENT
Start: 2024-02-24 | End: 2024-02-22 | Stop reason: HOSPADM

## 2024-02-22 RX ORDER — OLANZAPINE 2.5 MG/1
2.5 TABLET, FILM COATED ORAL
Status: CANCELLED | OUTPATIENT
Start: 2024-02-22

## 2024-02-22 RX ORDER — HYDROCORTISONE 10 MG/1
15 TABLET ORAL
Status: DISCONTINUED | OUTPATIENT
Start: 2024-02-22 | End: 2024-02-22 | Stop reason: HOSPADM

## 2024-02-22 RX ORDER — ACETAMINOPHEN 325 MG/1
650 TABLET ORAL EVERY 4 HOURS PRN
Status: DISCONTINUED | OUTPATIENT
Start: 2024-02-22 | End: 2024-02-28 | Stop reason: HOSPADM

## 2024-02-22 RX ORDER — MAGNESIUM HYDROXIDE/ALUMINUM HYDROXICE/SIMETHICONE 120; 1200; 1200 MG/30ML; MG/30ML; MG/30ML
30 SUSPENSION ORAL EVERY 4 HOURS PRN
Status: CANCELLED | OUTPATIENT
Start: 2024-02-22

## 2024-02-22 RX ORDER — FUROSEMIDE 20 MG/1
20 TABLET ORAL DAILY
Status: DISCONTINUED | OUTPATIENT
Start: 2024-02-23 | End: 2024-02-28 | Stop reason: HOSPADM

## 2024-02-22 RX ORDER — ACETAMINOPHEN 325 MG/1
975 TABLET ORAL EVERY 6 HOURS PRN
Status: CANCELLED | OUTPATIENT
Start: 2024-02-22

## 2024-02-22 RX ORDER — ACETAMINOPHEN 325 MG/1
975 TABLET ORAL EVERY 6 HOURS PRN
Status: DISCONTINUED | OUTPATIENT
Start: 2024-02-22 | End: 2024-02-28 | Stop reason: HOSPADM

## 2024-02-22 RX ORDER — HYDROCORTISONE 10 MG/1
5 TABLET ORAL DAILY
Status: DISCONTINUED | OUTPATIENT
Start: 2024-02-22 | End: 2024-02-22 | Stop reason: HOSPADM

## 2024-02-22 RX ORDER — CLONAZEPAM 0.5 MG/1
0.5 TABLET ORAL
Status: CANCELLED | OUTPATIENT
Start: 2024-02-22

## 2024-02-22 RX ORDER — CLONAZEPAM 1 MG/1
1 TABLET ORAL DAILY
Status: DISCONTINUED | OUTPATIENT
Start: 2024-02-23 | End: 2024-02-23

## 2024-02-22 RX ORDER — DIVALPROEX SODIUM 500 MG/1
1000 TABLET, EXTENDED RELEASE ORAL
Status: CANCELLED | OUTPATIENT
Start: 2024-02-22

## 2024-02-22 RX ORDER — AMOXICILLIN 250 MG
1 CAPSULE ORAL DAILY PRN
Status: CANCELLED | OUTPATIENT
Start: 2024-02-22

## 2024-02-22 RX ORDER — ARIPIPRAZOLE 15 MG/1
15 TABLET ORAL DAILY
Status: DISCONTINUED | OUTPATIENT
Start: 2024-02-23 | End: 2024-02-28 | Stop reason: HOSPADM

## 2024-02-22 RX ORDER — HYDROXYZINE HYDROCHLORIDE 25 MG/1
100 TABLET, FILM COATED ORAL
Status: CANCELLED | OUTPATIENT
Start: 2024-02-22

## 2024-02-22 RX ORDER — AMOXICILLIN 250 MG
1 CAPSULE ORAL DAILY PRN
Status: DISCONTINUED | OUTPATIENT
Start: 2024-02-22 | End: 2024-02-28 | Stop reason: HOSPADM

## 2024-02-22 RX ORDER — ACETAMINOPHEN 325 MG/1
650 TABLET ORAL EVERY 6 HOURS PRN
Status: DISCONTINUED | OUTPATIENT
Start: 2024-02-22 | End: 2024-02-28 | Stop reason: HOSPADM

## 2024-02-22 RX ORDER — DIPHENHYDRAMINE HYDROCHLORIDE 50 MG/ML
50 INJECTION INTRAMUSCULAR; INTRAVENOUS EVERY 6 HOURS PRN
Status: CANCELLED | OUTPATIENT
Start: 2024-02-22

## 2024-02-22 RX ORDER — HYDROXYZINE 50 MG/1
100 TABLET, FILM COATED ORAL
Status: DISCONTINUED | OUTPATIENT
Start: 2024-02-22 | End: 2024-02-28 | Stop reason: HOSPADM

## 2024-02-22 RX ORDER — CLONAZEPAM 0.5 MG/1
1 TABLET ORAL DAILY
Status: CANCELLED | OUTPATIENT
Start: 2024-02-22

## 2024-02-22 RX ORDER — OLANZAPINE 5 MG/1
5 TABLET ORAL
Status: DISCONTINUED | OUTPATIENT
Start: 2024-02-22 | End: 2024-02-28 | Stop reason: HOSPADM

## 2024-02-22 RX ORDER — FUROSEMIDE 40 MG/1
20 TABLET ORAL DAILY
Status: CANCELLED | OUTPATIENT
Start: 2024-02-23

## 2024-02-22 RX ORDER — POLYETHYLENE GLYCOL 3350 17 G/17G
17 POWDER, FOR SOLUTION ORAL DAILY PRN
Status: CANCELLED | OUTPATIENT
Start: 2024-02-22

## 2024-02-22 RX ORDER — CLONAZEPAM 0.5 MG/1
0.5 TABLET ORAL
Status: DISCONTINUED | OUTPATIENT
Start: 2024-02-22 | End: 2024-02-22 | Stop reason: HOSPADM

## 2024-02-22 RX ORDER — HYDROCORTISONE 10 MG/1
15 TABLET ORAL
Status: DISCONTINUED | OUTPATIENT
Start: 2024-02-23 | End: 2024-02-28 | Stop reason: HOSPADM

## 2024-02-22 RX ORDER — OLANZAPINE 2.5 MG/1
10 TABLET, FILM COATED ORAL
Status: CANCELLED | OUTPATIENT
Start: 2024-02-22

## 2024-02-22 RX ORDER — LEVOTHYROXINE SODIUM 112 UG/1
224 TABLET ORAL DAILY
Status: DISCONTINUED | OUTPATIENT
Start: 2024-02-22 | End: 2024-02-22

## 2024-02-22 RX ORDER — HYDROCORTISONE 10 MG/1
5 TABLET ORAL DAILY
Status: CANCELLED | OUTPATIENT
Start: 2024-02-22

## 2024-02-22 RX ORDER — CLONAZEPAM 0.5 MG/1
1 TABLET ORAL DAILY
Status: DISCONTINUED | OUTPATIENT
Start: 2024-02-22 | End: 2024-02-22 | Stop reason: HOSPADM

## 2024-02-22 RX ORDER — LEVOTHYROXINE SODIUM 0.1 MG/1
300 TABLET ORAL
Status: DISCONTINUED | OUTPATIENT
Start: 2024-02-22 | End: 2024-02-22

## 2024-02-22 RX ORDER — DIPHENHYDRAMINE HYDROCHLORIDE 50 MG/ML
50 INJECTION INTRAMUSCULAR; INTRAVENOUS EVERY 6 HOURS PRN
Status: DISCONTINUED | OUTPATIENT
Start: 2024-02-22 | End: 2024-02-28 | Stop reason: HOSPADM

## 2024-02-22 RX ORDER — ACETAMINOPHEN 325 MG/1
650 TABLET ORAL EVERY 6 HOURS PRN
Status: CANCELLED | OUTPATIENT
Start: 2024-02-22

## 2024-02-22 RX ORDER — OLANZAPINE 10 MG/2ML
10 INJECTION, POWDER, FOR SOLUTION INTRAMUSCULAR
Status: DISCONTINUED | OUTPATIENT
Start: 2024-02-22 | End: 2024-02-28 | Stop reason: HOSPADM

## 2024-02-22 RX ORDER — LEVOTHYROXINE SODIUM 112 UG/1
224 TABLET ORAL
Status: DISCONTINUED | OUTPATIENT
Start: 2024-02-22 | End: 2024-02-22 | Stop reason: HOSPADM

## 2024-02-22 RX ORDER — ARIPIPRAZOLE 5 MG/1
15 TABLET ORAL DAILY
Status: DISCONTINUED | OUTPATIENT
Start: 2024-02-22 | End: 2024-02-22 | Stop reason: HOSPADM

## 2024-02-22 RX ORDER — OLANZAPINE 2.5 MG/1
2.5 TABLET, FILM COATED ORAL
Status: DISCONTINUED | OUTPATIENT
Start: 2024-02-22 | End: 2024-02-28 | Stop reason: HOSPADM

## 2024-02-22 RX ORDER — HYDROXYZINE HYDROCHLORIDE 25 MG/1
25 TABLET, FILM COATED ORAL
Status: CANCELLED | OUTPATIENT
Start: 2024-02-22

## 2024-02-22 RX ORDER — ACETAMINOPHEN 325 MG/1
650 TABLET ORAL EVERY 4 HOURS PRN
Status: CANCELLED | OUTPATIENT
Start: 2024-02-22

## 2024-02-22 RX ORDER — OLANZAPINE 10 MG/2ML
10 INJECTION, POWDER, FOR SOLUTION INTRAMUSCULAR
Status: CANCELLED | OUTPATIENT
Start: 2024-02-22

## 2024-02-22 RX ORDER — HYDROCORTISONE 10 MG/1
5 TABLET ORAL DAILY
Status: DISCONTINUED | OUTPATIENT
Start: 2024-02-23 | End: 2024-02-28 | Stop reason: HOSPADM

## 2024-02-22 RX ORDER — LEVOTHYROXINE SODIUM 112 UG/1
224 TABLET ORAL
Status: DISCONTINUED | OUTPATIENT
Start: 2024-02-23 | End: 2024-02-28 | Stop reason: HOSPADM

## 2024-02-22 RX ADMIN — HYDROCORTISONE 5 MG: 10 TABLET ORAL at 15:37

## 2024-02-22 RX ADMIN — SERTRALINE HYDROCHLORIDE 100 MG: 50 TABLET ORAL at 09:46

## 2024-02-22 RX ADMIN — FUROSEMIDE 20 MG: 40 TABLET ORAL at 09:52

## 2024-02-22 RX ADMIN — ACETAMINOPHEN 650 MG: 325 TABLET, FILM COATED ORAL at 00:35

## 2024-02-22 RX ADMIN — ARIPIPRAZOLE 15 MG: 10 TABLET ORAL at 09:46

## 2024-02-22 RX ADMIN — HYDROCORTISONE 15 MG: 10 TABLET ORAL at 08:24

## 2024-02-22 RX ADMIN — LEVOTHYROXINE SODIUM 224 MCG: 112 TABLET ORAL at 08:24

## 2024-02-22 RX ADMIN — CLONAZEPAM 1 MG: 0.5 TABLET ORAL at 15:37

## 2024-02-22 RX ADMIN — ACETAMINOPHEN 650 MG: 325 TABLET, FILM COATED ORAL at 13:12

## 2024-02-22 RX ADMIN — DIVALPROEX SODIUM 1000 MG: 500 TABLET, EXTENDED RELEASE ORAL at 21:40

## 2024-02-22 RX ADMIN — CLONAZEPAM 0.5 MG: 0.5 TABLET ORAL at 21:41

## 2024-02-22 RX ADMIN — SERTRALINE HYDROCHLORIDE 50 MG: 50 TABLET ORAL at 21:41

## 2024-02-22 NOTE — ED PROVIDER NOTES
History  Chief Complaint   Patient presents with    Laceration     Patient arrives to the ER via EMS with complaints of scratches to neck and toes. Bruising to left elbow, left wrist, and right hip pain. Pt. punched a window today. Pt denies SI/HI. + N/V. Pt calm and cooperative. Pt from [facility] @ [address] [place] [address].      43-year-old female presents to the emergency room with multiple complaints after becoming upset with the resident at the group home that she lives at and becoming agitated.    Per the group home staff, the patient injured her hip by bumping into furniture and subsequently punched a window with her left hand.  Patient walked on glass, injuring her feet.  Following this, patient took shards of glass and attempted to cut her neck, incurring several scratches but no lacerations.  There is no active bleeding since EMS arrived.    Patient states she was angry with her roommate but refuses to provide further information.  Patient states she does not want to hurt herself anymore.  Patient lives at a group home and they petition an involuntary admission to the hospital.    Impression and plan: anger with suicidal statements and actions.  302 initiated by the patient's group home which has not arrived yet.  Patient states she is amenable to sign into the hospital though it is unclear if patient has capacity.  Will have crisis evaluate the patient regarding continued disposition.  Regarding patient's injuries, plain film imaging obtained of patient's left hand and wrist, right hip, and bilateral feet.  On review, there appears to be a small foreign body in the left great toe that likely represents a glass shard from her injury.  I cannot appreciate this clinically and I do not believe there is a need to attempt retrieval considering the size.  Discussed this with the patient, local wound care applied, patient's tetanus vaccine updated as it has been greater than 5 years.  Patient is  medically cleared for psychiatric evaluation and treatment.          Prior to Admission Medications   Prescriptions Last Dose Informant Patient Reported? Taking?   ARIPiprazole (ABILIFY) 15 mg tablet  Care Giver, Self Yes No   Sig: Take 15 mg by mouth daily   Calcium Carb-Cholecalciferol (CALCIUM 600-D PO)  Self, Care Giver Yes No   Calcium Carbonate-Vitamin D 500-125 MG-UNIT TABS  Care Giver, Self Yes No   Sig: Take 1 tablet by mouth 2 (two) times a day   Patient not taking: Reported on 2/5/2024   Cholecalciferol (VITAMIN D) 50 MCG (2000 UT) tablet  Care Giver, Self Yes No   Sig: Take 2,000 Units by mouth daily   Patient not taking: Reported on 2/5/2024   HYDROCORTISONE PO  Care Giver, Self Yes No   Sig: Take 15 mg by mouth daily with breakfast   Multiple Vitamin (MULTIVITAMIN) tablet  Care Giver, Self Yes No   Sig: Take 1 tablet by mouth daily   Omega-3 Fatty Acids (FISH OIL) 1,000 mg  Care Giver, Self Yes No   Sig: Take 1,000 mg by mouth 3 (three) times a day   Patient not taking: Reported on 2/5/2024   QUEtiapine (SEROquel XR) 50 mg  Self, Care Giver Yes No   Patient not taking: Reported on 2/5/2024   QUEtiapine (SEROquel) 50 mg tablet  Care Giver, Self Yes No   Sig: Take 100 mg by mouth daily at bedtime   Patient not taking: Reported on 2/5/2024   Refresh Tears 0.5 % SOLN  Self, Care Giver Yes No   acetaminophen (TYLENOL) 325 mg tablet  Care Giver, Self Yes No   Sig: Take 650 mg by mouth every 6 (six) hours as needed for mild pain   albuterol (PROVENTIL HFA,VENTOLIN HFA) 90 mcg/act inhaler  Care Giver, Self Yes No   Sig: Inhale 2 puffs every 6 (six) hours as needed for wheezing   clonazePAM (KlonoPIN) 0.5 mg tablet  Care Giver, Self Yes No   Sig: Take 0.5 mg by mouth daily at bedtime   clonazePAM (KlonoPIN) 1 mg tablet  Care Giver, Self Yes No   Sig: Take 1 mg by mouth daily Daily @ 3pm.   divalproex sodium (DEPAKOTE ER) 500 mg 24 hr tablet  Care Giver, Self Yes No   Sig: Take 1,000 mg by mouth daily at bedtime    docusate sodium (COLACE) 100 mg capsule  Care Giver, Self Yes No   Sig: Take 100 mg by mouth daily   furosemide (LASIX) 20 mg tablet  Care Giver, Self Yes No   Sig: Take 20 mg by mouth daily   hydrocortisone (CORTEF) 10 mg tablet  Self, Care Giver Yes No   Sig: 10 mg daily Take one half (0.5) tablets by mouth daily at 3pm.   ibuprofen (MOTRIN) 200 mg tablet  Care Giver, Self Yes No   Sig: Take 200 mg by mouth every 6 (six) hours as needed for mild pain   levothyroxine 112 mcg tablet  Care Giver, Self Yes No   Sig: Take by mouth Take 3 tablets by mouth twice a week on Saturday and Sunday.   levothyroxine 112 mcg tablet   Yes No   Sig: Take by mouth daily Take 2 tablets by mouth Monday - Friday.   lidocaine (Lidoderm) 5 %  Self, Care Giver No No   Sig: Apply 1 patch topically over 12 hours daily as needed (pain) Remove & Discard patch within 12 hours or as directed by MD   meclizine (ANTIVERT) 25 mg tablet  Care Giver, Self Yes No   Sig: Take 25 mg by mouth every 12 (twelve) hours as needed for dizziness   meloxicam (MOBIC) 15 mg tablet  Self, Care Giver No No   Sig: Take 1 tablet (15 mg total) by mouth daily as needed for moderate pain   mupirocin (BACTROBAN) 2 % ointment  Care Giver, Self Yes No   Sig: Apply 1 application topically 3 (three) times a day   sertraline (ZOLOFT) 100 mg tablet  Care Giver, Self Yes No   Sig: Take 100 mg by mouth daily   sertraline (ZOLOFT) 50 mg tablet  Care Giver, Self Yes No   Sig: Take 50 mg by mouth daily at bedtime   triamcinolone (KENALOG) 0.1 % cream  Care Giver, Self Yes No   Sig: Apply topically 2 (two) times a day as needed (ezcema)      Facility-Administered Medications: None       Past Medical History:   Diagnosis Date    Adrenocortical insufficiency (HCC)     Bipolar disorder (HCC)     Disease of thyroid gland     hypo    Disorder of bone density and structure, unspecified     Epilepsy (HCC)     Hyperlipidemia     Hypopituitarism (HCC)     Mild intellectual disabilities      Psychiatric disorder     bipolar       Past Surgical History:   Procedure Laterality Date    EPIDURAL BLOCK INJECTION N/A 8/23/2023    Procedure: L3-L4  LUMBAR epidural steroid injection (49754);  Surgeon: Adam Hayden DO;  Location: [facility] MAIN OR;  Service: Pain Management        History reviewed. No pertinent family history.  I have reviewed and agree with the history as documented.    E-Cigarette/Vaping    E-Cigarette Use Never User      E-Cigarette/Vaping Substances    Nicotine No     THC No     CBD No     Flavoring No     Other No     Unknown No      Social History     Tobacco Use    Smoking status: Never    Smokeless tobacco: Never   Vaping Use    Vaping status: Never Used   Substance Use Topics    Alcohol use: Not Currently    Drug use: Not Currently       Review of Systems    Physical Exam  Physical Exam  Vitals reviewed.   HENT:      Head: Atraumatic.   Eyes:      Pupils: Pupils are equal, round, and reactive to light.   Neck:      Vascular: No carotid bruit.      Trachea: Trachea normal.        Comments: Multiple superficial linear abrasions without active bleeding.  These do not require surgical closure.    No stridor.  No contusions.  Cardiovascular:      Rate and Rhythm: Normal rate.   Abdominal:      General: There is no distension.   Musculoskeletal:         General: Tenderness and signs of injury present. No deformity.      Cervical back: Neck supple. Signs of trauma present. No rigidity or crepitus. No spinous process tenderness.        Legs:       Comments: Chronic deformities of bilateral feet.  Abrasions noted.  No foreign bodies appreciated with palpation.  Tenderness mainly over the area of the medial right ankle.  Palpable DP pulses with appropriate capillary refill.  Sensory is intact in the dermatomes of the foot.  Normal motor including dorsiflexion plantarflexion of the great toe.    Normal inspection of the left hand and the wrist.  Tenderness diffusely over the left wrist.  No  focal area of tenderness.  2+ radial pulse appropriate capillary refill.  Normal sensory and motor examination of the hand.   Skin:     General: Skin is warm and dry.   Neurological:      General: No focal deficit present.      Mental Status: She is alert.   Psychiatric:         Mood and Affect: Mood normal. Affect is blunt and flat.         Speech: Speech is rapid and pressured.         Thought Content: Thought content does not include homicidal or suicidal ideation. Thought content does not include homicidal or suicidal plan.         Vital Signs  ED Triage Vitals   Temperature Pulse Respirations Blood Pressure SpO2   02/21/24 2040 02/21/24 2040 02/21/24 2040 02/21/24 2040 02/21/24 2040   98 °F (36.7 °C) 79 20 146/93 98 %      Temp src Heart Rate Source Patient Position - Orthostatic VS BP Location FiO2 (%)   -- 02/22/24 0706 02/22/24 0706 02/22/24 0706 --    Monitor Lying Right arm       Pain Score       02/22/24 0035       2           Vitals:    02/21/24 2040 02/22/24 0706 02/22/24 1113   BP: 146/93 112/60 126/79   Pulse: 79 79 75   Patient Position - Orthostatic VS:  Lying          Visual Acuity      ED Medications  Medications   ARIPiprazole (ABILIFY) tablet 15 mg (15 mg Oral Given 2/22/24 0946)   clonazePAM (KlonoPIN) tablet 0.5 mg (has no administration in time range)   clonazePAM (KlonoPIN) tablet 1 mg (has no administration in time range)   divalproex sodium (DEPAKOTE ER) 24 hr tablet 1,000 mg (has no administration in time range)   furosemide (LASIX) tablet 20 mg (20 mg Oral Given 2/22/24 0952)   hydrocortisone (CORTEF) tablet 15 mg (15 mg Oral Given 2/22/24 0824)   hydrocortisone (CORTEF) tablet 5 mg (has no administration in time range)   sertraline (ZOLOFT) tablet 100 mg (100 mg Oral Given 2/22/24 0946)   sertraline (ZOLOFT) tablet 50 mg (has no administration in time range)   levothyroxine tablet 336 mcg (has no administration in time range)   levothyroxine tablet 224 mcg (224 mcg Oral Given 2/22/24  0824)   bacitracin topical ointment 1 large application (1 large application Topical Given 2/21/24 2227)   tetanus-diphtheria-acellular pertussis (BOOSTRIX) IM injection 0.5 mL (0.5 mL Intramuscular Given 2/21/24 2224)   ondansetron (ZOFRAN-ODT) dispersible tablet 4 mg (4 mg Oral Given 2/21/24 2313)   acetaminophen (TYLENOL) tablet 650 mg (650 mg Oral Given 2/22/24 0035)   acetaminophen (TYLENOL) tablet 650 mg (650 mg Oral Given 2/22/24 1312)       Diagnostic Studies  Results Reviewed       Procedure Component Value Units Date/Time    Rapid drug screen, urine [[id number]]  (Normal) Collected: 02/22/24 0814    Lab Status: Final result Specimen: Urine, Catheter Updated: 02/22/24 0840     Amph/Meth UR Negative     Barbiturate Ur Negative     Benzodiazepine Urine Negative     Cocaine Urine Negative     Methadone Urine Negative     Opiate Urine Negative     PCP Ur Negative     THC Urine Negative     Oxycodone Urine Negative    Narrative:      FOR MEDICAL PURPOSES ONLY.   IF CONFIRMATION NEEDED PLEASE CONTACT THE LAB WITHIN 5 DAYS.    Drug Screen Cutoff Levels:  AMPHETAMINE/METHAMPHETAMINES  1000 ng/mL  BARBITURATES     200 ng/mL  BENZODIAZEPINES     200 ng/mL  COCAINE      300 ng/mL  METHADONE      300 ng/mL  OPIATES      300 ng/mL  PHENCYCLIDINE     25 ng/mL  THC       50 ng/mL  OXYCODONE      100 ng/mL    POCT alcohol breath test [[id number]]  (Normal) Resulted: 02/22/24 0817    Lab Status: Final result Updated: 02/22/24 0817     EXTBreath Alcohol 0.00    POCT pregnancy, urine [[id number]]  (Normal) Resulted: 02/21/24 2200    Lab Status: Final result Updated: 02/21/24 2200     EXT Preg Test, Ur Negative     Control Valid                   XR wrist 3+ views LEFT   ED Interpretation by Marvin Lobo MD (02/22 0000)   No acute findings.      Final Result by Randy Cho MD (02/22 1309)      No acute osseous abnormality.            Workstation performed: [id number]         XR hip/pelv 2-3 vws right if performed   ED  Interpretation by Marvin Lobo MD (02/22 0003)   No acute findings.      Final Result by Randy Cho MD (02/22 1310)      No acute osseous abnormality.      Degenerative changes as described.            Workstation performed: [id number]         XR foot 3+ views RIGHT   ED Interpretation by Marvin Lobo MD (02/22 0005)   No acute findings.      Final Result by Randy Cho MD (02/22 1313)      No acute osseous abnormality.      Workstation performed: [id number]         XR foot 3+ views LEFT   ED Interpretation by Marvin Lobo MD (02/22 0004)   Abnormal   Possible radiopaque foreign body at the great toe not appreciable on clinical examination      Final Result by Randy Cho MD (02/22 1315)      No acute osseous abnormality. Ovoid soft tissue radiodensity measuring 1 mm in the plantar aspect of the first digit, possibly a tiny foreign body.      Workstation performed: [id number]         XR hand 3+ views LEFT   ED Interpretation by Marvin Lobo MD (02/22 0018)   Unclear radio      Final Result by Randy Cho MD (02/22 1307)      No acute osseous abnormality.      Workstation performed: [id number]                    Procedures  Procedures         ED Course                               SBIRT 20yo+      Flowsheet Row Most Recent Value   Initial Alcohol Screen: US AUDIT-C     1. How often do you have a drink containing alcohol? 0 Filed at: 02/22/2024 1007   2. How many drinks containing alcohol do you have on a typical day you are drinking?  0 Filed at: 02/22/2024 1007   3a. Male UNDER 65: How often do you have five or more drinks on one occasion? 0 Filed at: 02/22/2024 1007   3b. FEMALE Any Age, or MALE 65+: How often do you have 4 or more drinks on one occassion? 0 Filed at: 02/22/2024 1007   Audit-C Score 0 Filed at: 02/22/2024 1007   FABY: How many times in the past year have you...    Used an illegal drug or used a prescription medication for non-medical reasons? Never Filed at:  02/22/2024 1007                      Medical Decision Making  Amount and/or Complexity of Data Reviewed  Labs: ordered.  Radiology: ordered and independent interpretation performed.    Risk  OTC drugs.  Prescription drug management.  Decision regarding hospitalization.             Disposition  Final diagnoses:   Foreign body of toe   Suicidal behavior with attempted self-injury (HCC)   Wrist injury, left, initial encounter   Abrasion of multiple sites of head and neck     Time reflects when diagnosis was documented in both MDM as applicable and the Disposition within this note       Time User Action Codes Description Comment    2/22/2024 12:13 AM Marvin Lobo [S90.456A] Foreign body of toe     2/22/2024 12:13 AM Marvin Lobo [R45.89] Suicidal behavior without attempted self-injury     2/22/2024 12:13 AM Marvin Lobo Remove [R45.89] Suicidal behavior without attempted self-injury     2/22/2024 12:13 AM Marvin Lobo [T14.91XA] Suicidal behavior with attempted self-injury (HCC)     2/22/2024 12:14 AM Marvin Lobo Modify [S90.456A] Foreign body of toe     2/22/2024 12:14 AM Marvin Lobo Modify [T14.91XA] Suicidal behavior with attempted self-injury (HCC)     2/22/2024 12:14 AM Marvin Lobo [S69.92XA] Wrist injury, left, initial encounter     2/22/2024 12:14 AM Marvin Lobo [S00.91XA,  S10.91XA] Abrasion of multiple sites of head and neck     2/22/2024 10:57 AM Baudilio Zimmerman Add [Z00.8] Medical clearance for psychiatric admission           ED Disposition       ED Disposition   Transfer to Behavioral Health Condition   --    Date/Time   Thu Feb 22, 2024 1139    Comment   Yaritza Dunne should be transferred out to  and has been medically cleared.               MD Documentation      Flowsheet Row Most Recent Value   Patient Condition The patient has been stabilized such that within reasonable medical probability, no material deterioration of the patient condition or the condition of the unborn  child(elfego) is likely to result from the transfer   Reason for Transfer Level of Care needed not available at this facility   Benefits of Transfer Specialized equipment and/or services available at the receiving facility (Include comment)________________________   Risks of Transfer Potential for delay in receiving treatment   Accepting Physician Dr. Zimmerman   Accepting Facility Name, [facility] [address], [address] [facility] [address]    (Name & Tel number) LEXA Faulkner, [phone]   Sending MD Dr. Hernandez          RN Documentation      Flowsheet Row Most Recent Value   Accepting Facility Name, [facility] [address], [address] [facility] [address]    (Name & Tel number) LEXA Faulkner, [phone]   Transfer Date 02/22/24          Follow-up Information    None         Patient's Medications   Discharge Prescriptions    No medications on file       No discharge procedures on file.    PDMP Review       None            ED Provider  Electronically Signed by             Marvin Lobo MD  02/22/24 9186

## 2024-02-22 NOTE — NURSING NOTE
"Patient is a 43 year old female 302 admitted from [facility] ED. According to ED note, patient reportedly \"became extremely angry and agitated at a house mate, broke a window in the room and dragged the piece of glass across her throat and puncture marks with blood are seen. Patient has a diagnosis of Bipolar II, DID and epilepsy. Laura was last hospitalized last week and continues to state that she will hurt herself and stated she will kill herself.\" Patient denies SI upon admission to the unit, states \"I just say those things, I don't mean them.\" Patient has superficial cuts across her neck, patient states from \"scratching with her fingernails\". Patient also complaining of pain in her left wrist and wants to go to medical after inpatient psych stay. Patient reports allergies to Haldol and Thioridazine. Patient also denies HI, SIB, auditory and visual hallucinations. Calm and cooperative with admission process. Medications reconciled with group home med list. Given a dinner tray and oriented to unit. Patient is a fall risk due to limited mobility and apparent right leg deformity. Denies any unmet needs at this time. Q7 safety checks began.  "

## 2024-02-22 NOTE — ED NOTES
"CW received 302 from [facility].     302 was petitioned by [facility] staff member alleging, \"Laura became extremely angry and agitated at a housemate, Laura broke the window in the room and dragged the piece of glass across her throat and puncture marks with blood are seen. Laura has a diagnosis of Bipolar II, DID and has epilepsy. Laura was last hospitalized last week and continues to state that she will hurt herself and stated she will kill herself.\"     CW placed call to [facility] to advise, 302 was sent to [facility] and pt arrived at [facility]. CW also advised [facility], pt's first name was spelled incorrectly on the original 302.    TDS, CW  "
"Pt presents w/a 302 petitioned by group home staff member. CW read and reviewed 302 w/pt. Pt confirms the events which took place in the 302. CW read and reviewed 302 w/pt. Pt states, \"I got angry w/[place], my house mate and I did try to hurt myself w/the glass.\" Pt denies active suicidality and no HI's or AVH's. Pt reports having good appetite and \"sometimes may want to eat more.\" Pt denies issues w/sleep. Pt does not know what her dx is. As per 302 petition, pt suffers from Bipolar II and DID. Pt appears to have slight to moderate ID. Pt does not report any hx of trauma and or abuse. Pt is calm, cooperative, and maintains fair eye contact. Pt engages in conversation. CW and pt discussed IP tx and pt does not appear to understand. Pt states, \"I just want to go upstairs and get some rest.\" CW attempted to explain pt will need to be transferred to a different facility but pt states, \"No they told me I could go upstairs.\" CW advised pt, CW would work on proper placement for pt to receive tx. Pt states, \"I may need my medicine changed too.\" CW advised pt, pt would receive the proper tx and pt appears receptive. 302 was completed by ED doctor due to pt's mental capacity.   Pt will return to group home following IP tx.   302 sent clinicals to [facility].     TDS, CW      "
Breakfast order placed.     Rina Dan, RN  02/22/24 0819    
CW at bedside.     Rina Dan, BYRON  02/22/24 0904    
CW received TT from [facility]    Patient is accepted at [address].  Patient is accepted by Dr. Zimmerman per [facility]     Transportation is arranged with TBD.    Transportation is scheduled for TBD.   Patient may go to the floor at [facility].          Nurse report is to be called to [phone] prior to patient transfer.     TDS, LEXA  
CW sent clinicals to [facility] for review.    TDS, CW  
Clinicals to be reviewed by DERRICK    TDS, CW  
LEXA received call from [facility], [phone]. CW provided [facility] w/updates.    TDS, CW  
LEXA sent completed 302 documents to [facility] county office via fax.    TDS, CW  
Lunch tray delivered.     Rina Dan, RN  02/22/24 9840    
Patient transported to X-ray     Hemalatha Owen RN  02/21/24 4482    
Pt's primary is Medicare - No pre-cert required    Insurance Authorization for admission:   Phone call placed to [place] Medicaid  Phone number: [phone]     Spoke to Yvon     ** days approved.  Level of care: **.  Review on **.   Authorization # UNABLE TO PROVIDE ANY INFO OR REQUEST PRIOR AUTH W/O [facility] FACILITY PIN #. CW requested to speak w/a supervisor, advised there is nobody else to speak with w/o the PIN #.         Eligibility Verification System checked - ([phone]).  Online system / automated system indicates: **    Insurance Authorization for Transportation:    Phone call placed to **.   Phone number **.   Spoke to **.   Authorization #: **    LEXA CANCHOLA     
show

## 2024-02-22 NOTE — PLAN OF CARE
Problem: SELF HARM/SUICIDALITY  Goal: Will have no self-injury during hospital stay  Description: INTERVENTIONS:  - Q 15 MINUTES: Routine safety checks  - Q WAKING SHIFT & PRN: Assess risk to determine if routine checks are adequate to maintain patient safety  - Encourage patient to participate actively in care by formulating a plan to combat response to suicidal ideation, identify supports and resources  Outcome: Progressing     Problem: BEHAVIOR  Goal: Pt/Family maintain appropriate behavior and adhere to behavioral management agreement, if implemented  Description: INTERVENTIONS:  - Assess the family dynamic   - Encourage verbalization of thoughts and concerns in a socially appropriate manner  - Assess patient/family's coping skills and non-compliant behavior (including use of illegal substances).  - Utilize positive, consistent limit setting strategies supporting safety of patient, staff and others  - Initiate consult with Case Management, Spiritual Care or other ancillary services as appropriate  - If a patient's/visitor's behavior jeopardizes the safety of the patient, staff, or others, refer to organization procedure.   - Notify Security of behavior or suspected illegal substances which indicate the need for search of the patient and/or belongings  - Encourage participation in the decision making process about a behavioral management agreement; implement if patient meets criteria  Outcome: Progressing     Problem: INVOLUNTARY ADMIT  Goal: Will cooperate with staff recommendations and doctor's orders and will demonstrate appropriate behavior  Description: INTERVENTIONS:  - Treat underlying conditions and offer medication as ordered  - Educate regarding involuntary admission procedures and rules  - Utilize positive consistent limit setting strategies to support patient and staff safety  Outcome: Progressing     Problem: SELF CARE DEFICIT  Goal: Return ADL status to a safe level of function  Description:  INTERVENTIONS:  - Administer medication as ordered  - Assess ADL deficits and provide assistive devices as needed  - Obtain PT/OT consults as needed  - Assist and instruct patient to increase activity and self care as tolerated  Outcome: Progressing     Problem: Ineffective Coping  Goal: Identifies ineffective coping skills  Outcome: Progressing  Goal: Demonstrates healthy coping skills  Outcome: Progressing     Problem: Risk for Violence/Aggression Toward Others  Goal: Treatment Goal: Refrain from acts of violence/aggression during length of stay, and demonstrate improved impulse control at the time of discharge  Outcome: Progressing     Problem: Ineffective Coping  Goal: Cooperates with admission process  Description: Interventions:   - Complete admission process  Outcome: Completed

## 2024-02-23 PROBLEM — E03.9 HYPOTHYROIDISM: Status: ACTIVE | Noted: 2024-02-23

## 2024-02-23 PROBLEM — F63.81 INTERMITTENT EXPLOSIVE DISORDER: Status: ACTIVE | Noted: 2024-02-23

## 2024-02-23 PROBLEM — E23.0 HYPOPITUITARISM (HCC): Status: ACTIVE | Noted: 2024-02-23

## 2024-02-23 PROBLEM — E78.5 DYSLIPIDEMIA: Status: ACTIVE | Noted: 2024-02-23

## 2024-02-23 PROBLEM — R94.31 T WAVE INVERSION IN EKG: Status: ACTIVE | Noted: 2024-02-23

## 2024-02-23 PROBLEM — E27.40 ADRENOCORTICAL INSUFFICIENCY (HCC): Status: ACTIVE | Noted: 2024-02-23

## 2024-02-23 PROBLEM — F39 UNSPECIFIED MOOD (AFFECTIVE) DISORDER (HCC): Status: ACTIVE | Noted: 2024-02-23

## 2024-02-23 PROBLEM — F79 INTELLECTUAL DISABILITY: Status: ACTIVE | Noted: 2024-02-23

## 2024-02-23 PROBLEM — G40.909 EPILEPSY (HCC): Status: ACTIVE | Noted: 2024-02-23

## 2024-02-23 PROBLEM — E66.9 OBESITY: Status: ACTIVE | Noted: 2024-02-23

## 2024-02-23 PROBLEM — S91.109A OPEN TOE WOUND: Status: ACTIVE | Noted: 2024-02-23

## 2024-02-23 PROBLEM — Z00.8 MEDICAL CLEARANCE FOR PSYCHIATRIC ADMISSION: Status: ACTIVE | Noted: 2024-02-23

## 2024-02-23 PROBLEM — R60.9 EDEMA: Status: ACTIVE | Noted: 2024-02-23

## 2024-02-23 LAB
25(OH)D3 SERPL-MCNC: 32 NG/ML (ref 30–100)
ALBUMIN SERPL BCP-MCNC: 3.4 G/DL (ref 3.5–5)
ALP SERPL-CCNC: 48 U/L (ref 34–104)
ALT SERPL W P-5'-P-CCNC: 11 U/L (ref 7–52)
ANION GAP SERPL CALCULATED.3IONS-SCNC: 7 MMOL/L
AST SERPL W P-5'-P-CCNC: 10 U/L (ref 13–39)
ATRIAL RATE: 72 BPM
ATRIAL RATE: 73 BPM
BASOPHILS # BLD AUTO: 0.01 THOUSANDS/ÂΜL (ref 0–0.1)
BASOPHILS NFR BLD AUTO: 0 % (ref 0–1)
BILIRUB SERPL-MCNC: 0.32 MG/DL (ref 0.2–1)
BUN SERPL-MCNC: 15 MG/DL (ref 5–25)
CALCIUM ALBUM COR SERPL-MCNC: 9.6 MG/DL (ref 8.3–10.1)
CALCIUM SERPL-MCNC: 9.1 MG/DL (ref 8.4–10.2)
CHLORIDE SERPL-SCNC: 104 MMOL/L (ref 96–108)
CHOLEST SERPL-MCNC: 149 MG/DL
CO2 SERPL-SCNC: 30 MMOL/L (ref 21–32)
CREAT SERPL-MCNC: 0.56 MG/DL (ref 0.6–1.3)
EOSINOPHIL # BLD AUTO: 0.02 THOUSAND/ÂΜL (ref 0–0.61)
EOSINOPHIL NFR BLD AUTO: 0 % (ref 0–6)
ERYTHROCYTE [DISTWIDTH] IN BLOOD BY AUTOMATED COUNT: 12.6 % (ref 11.6–15.1)
GFR SERPL CREATININE-BSD FRML MDRD: 113 ML/MIN/1.73SQ M
GLUCOSE P FAST SERPL-MCNC: 87 MG/DL (ref 65–99)
GLUCOSE SERPL-MCNC: 87 MG/DL (ref 65–140)
HCT VFR BLD AUTO: 35.4 % (ref 34.8–46.1)
HDLC SERPL-MCNC: 42 MG/DL
HGB BLD-MCNC: 11.8 G/DL (ref 11.5–15.4)
IMM GRANULOCYTES # BLD AUTO: 0.02 THOUSAND/UL (ref 0–0.2)
IMM GRANULOCYTES NFR BLD AUTO: 0 % (ref 0–2)
LDLC SERPL CALC-MCNC: 80 MG/DL (ref 0–100)
LYMPHOCYTES # BLD AUTO: 2.4 THOUSANDS/ÂΜL (ref 0.6–4.47)
LYMPHOCYTES NFR BLD AUTO: 48 % (ref 14–44)
MAGNESIUM SERPL-MCNC: 1.8 MG/DL (ref 1.9–2.7)
MCH RBC QN AUTO: 28.9 PG (ref 26.8–34.3)
MCHC RBC AUTO-ENTMCNC: 33.3 G/DL (ref 31.4–37.4)
MCV RBC AUTO: 87 FL (ref 82–98)
MONOCYTES # BLD AUTO: 0.3 THOUSAND/ÂΜL (ref 0.17–1.22)
MONOCYTES NFR BLD AUTO: 6 % (ref 4–12)
NEUTROPHILS # BLD AUTO: 2.35 THOUSANDS/ÂΜL (ref 1.85–7.62)
NEUTS SEG NFR BLD AUTO: 46 % (ref 43–75)
NONHDLC SERPL-MCNC: 107 MG/DL
NRBC BLD AUTO-RTO: 0 /100 WBCS
P AXIS: 44 DEGREES
P AXIS: 48 DEGREES
PHOSPHATE SERPL-MCNC: 4.6 MG/DL (ref 2.7–4.5)
PLATELET # BLD AUTO: 182 THOUSANDS/UL (ref 149–390)
PMV BLD AUTO: 10.8 FL (ref 8.9–12.7)
POTASSIUM SERPL-SCNC: 4.2 MMOL/L (ref 3.5–5.3)
PR INTERVAL: 176 MS
PR INTERVAL: 180 MS
PROT SERPL-MCNC: 5.3 G/DL (ref 6.4–8.4)
QRS AXIS: 27 DEGREES
QRS AXIS: 28 DEGREES
QRSD INTERVAL: 108 MS
QRSD INTERVAL: 110 MS
QT INTERVAL: 382 MS
QT INTERVAL: 388 MS
QTC INTERVAL: 420 MS
QTC INTERVAL: 424 MS
RBC # BLD AUTO: 4.09 MILLION/UL (ref 3.81–5.12)
SODIUM SERPL-SCNC: 141 MMOL/L (ref 135–147)
T WAVE AXIS: 16 DEGREES
T WAVE AXIS: 33 DEGREES
T4 FREE SERPL-MCNC: 1.02 NG/DL (ref 0.61–1.12)
TREPONEMA PALLIDUM IGG+IGM AB [PRESENCE] IN SERUM OR PLASMA BY IMMUNOASSAY: NORMAL
TRIGL SERPL-MCNC: 134 MG/DL
TSH SERPL DL<=0.05 MIU/L-ACNC: <0.01 UIU/ML (ref 0.45–4.5)
VALPROATE SERPL-MCNC: 61 UG/ML (ref 50–100)
VENTRICULAR RATE: 72 BPM
VENTRICULAR RATE: 73 BPM
VIT B12 SERPL-MCNC: 350 PG/ML (ref 180–914)
WBC # BLD AUTO: 5.1 THOUSAND/UL (ref 4.31–10.16)

## 2024-02-23 PROCEDURE — 93010 ELECTROCARDIOGRAM REPORT: CPT | Performed by: INTERNAL MEDICINE

## 2024-02-23 PROCEDURE — 84443 ASSAY THYROID STIM HORMONE: CPT | Performed by: STUDENT IN AN ORGANIZED HEALTH CARE EDUCATION/TRAINING PROGRAM

## 2024-02-23 PROCEDURE — 93005 ELECTROCARDIOGRAM TRACING: CPT

## 2024-02-23 PROCEDURE — 83735 ASSAY OF MAGNESIUM: CPT | Performed by: STUDENT IN AN ORGANIZED HEALTH CARE EDUCATION/TRAINING PROGRAM

## 2024-02-23 PROCEDURE — 99222 1ST HOSP IP/OBS MODERATE 55: CPT | Performed by: NURSE PRACTITIONER

## 2024-02-23 PROCEDURE — 80164 ASSAY DIPROPYLACETIC ACD TOT: CPT | Performed by: STUDENT IN AN ORGANIZED HEALTH CARE EDUCATION/TRAINING PROGRAM

## 2024-02-23 PROCEDURE — 84100 ASSAY OF PHOSPHORUS: CPT | Performed by: STUDENT IN AN ORGANIZED HEALTH CARE EDUCATION/TRAINING PROGRAM

## 2024-02-23 PROCEDURE — 86780 TREPONEMA PALLIDUM: CPT | Performed by: STUDENT IN AN ORGANIZED HEALTH CARE EDUCATION/TRAINING PROGRAM

## 2024-02-23 PROCEDURE — 82306 VITAMIN D 25 HYDROXY: CPT | Performed by: STUDENT IN AN ORGANIZED HEALTH CARE EDUCATION/TRAINING PROGRAM

## 2024-02-23 PROCEDURE — 85025 COMPLETE CBC W/AUTO DIFF WBC: CPT | Performed by: STUDENT IN AN ORGANIZED HEALTH CARE EDUCATION/TRAINING PROGRAM

## 2024-02-23 PROCEDURE — 82607 VITAMIN B-12: CPT | Performed by: STUDENT IN AN ORGANIZED HEALTH CARE EDUCATION/TRAINING PROGRAM

## 2024-02-23 PROCEDURE — 99223 1ST HOSP IP/OBS HIGH 75: CPT | Performed by: STUDENT IN AN ORGANIZED HEALTH CARE EDUCATION/TRAINING PROGRAM

## 2024-02-23 PROCEDURE — 84439 ASSAY OF FREE THYROXINE: CPT | Performed by: STUDENT IN AN ORGANIZED HEALTH CARE EDUCATION/TRAINING PROGRAM

## 2024-02-23 PROCEDURE — 80061 LIPID PANEL: CPT | Performed by: STUDENT IN AN ORGANIZED HEALTH CARE EDUCATION/TRAINING PROGRAM

## 2024-02-23 PROCEDURE — 80053 COMPREHEN METABOLIC PANEL: CPT | Performed by: STUDENT IN AN ORGANIZED HEALTH CARE EDUCATION/TRAINING PROGRAM

## 2024-02-23 RX ORDER — CLONAZEPAM 0.5 MG/1
0.5 TABLET ORAL 2 TIMES DAILY
Status: DISCONTINUED | OUTPATIENT
Start: 2024-02-23 | End: 2024-02-28 | Stop reason: HOSPADM

## 2024-02-23 RX ORDER — CHLORAL HYDRATE 500 MG
1000 CAPSULE ORAL 3 TIMES DAILY
Status: DISCONTINUED | OUTPATIENT
Start: 2024-02-23 | End: 2024-02-28 | Stop reason: HOSPADM

## 2024-02-23 RX ORDER — LANOLIN ALCOHOL/MO/W.PET/CERES
3 CREAM (GRAM) TOPICAL
Status: CANCELLED | OUTPATIENT
Start: 2024-02-23

## 2024-02-23 RX ORDER — LANOLIN ALCOHOL/MO/W.PET/CERES
400 CREAM (GRAM) TOPICAL 2 TIMES DAILY
Status: DISCONTINUED | OUTPATIENT
Start: 2024-02-23 | End: 2024-02-28 | Stop reason: HOSPADM

## 2024-02-23 RX ORDER — GINSENG 100 MG
1 CAPSULE ORAL 2 TIMES DAILY
Status: DISCONTINUED | OUTPATIENT
Start: 2024-02-23 | End: 2024-02-23

## 2024-02-23 RX ORDER — BACITRACIN, NEOMYCIN, POLYMYXIN B 400; 3.5; 5 [USP'U]/G; MG/G; [USP'U]/G
1 OINTMENT TOPICAL 2 TIMES DAILY
Status: DISCONTINUED | OUTPATIENT
Start: 2024-02-23 | End: 2024-02-23

## 2024-02-23 RX ORDER — LANOLIN ALCOHOL/MO/W.PET/CERES
1 CREAM (GRAM) TOPICAL 2 TIMES DAILY WITH MEALS
Status: DISCONTINUED | OUTPATIENT
Start: 2024-02-23 | End: 2024-02-28 | Stop reason: HOSPADM

## 2024-02-23 RX ORDER — DOCUSATE SODIUM 100 MG/1
100 CAPSULE, LIQUID FILLED ORAL DAILY
Status: DISCONTINUED | OUTPATIENT
Start: 2024-02-23 | End: 2024-02-28 | Stop reason: HOSPADM

## 2024-02-23 RX ORDER — MELATONIN
2000 DAILY
Status: DISCONTINUED | OUTPATIENT
Start: 2024-02-23 | End: 2024-02-28 | Stop reason: HOSPADM

## 2024-02-23 RX ORDER — ALBUTEROL SULFATE 90 UG/1
2 AEROSOL, METERED RESPIRATORY (INHALATION) EVERY 4 HOURS PRN
Status: DISCONTINUED | OUTPATIENT
Start: 2024-02-23 | End: 2024-02-28 | Stop reason: HOSPADM

## 2024-02-23 RX ADMIN — LEVOTHYROXINE SODIUM 224 MCG: 112 TABLET ORAL at 06:32

## 2024-02-23 RX ADMIN — SERTRALINE HYDROCHLORIDE 100 MG: 100 TABLET ORAL at 08:16

## 2024-02-23 RX ADMIN — FUROSEMIDE 20 MG: 20 TABLET ORAL at 08:16

## 2024-02-23 RX ADMIN — HYDROCORTISONE 15 MG: 10 TABLET ORAL at 08:15

## 2024-02-23 RX ADMIN — ACETAMINOPHEN 975 MG: 325 TABLET ORAL at 19:45

## 2024-02-23 RX ADMIN — ACETAMINOPHEN 975 MG: 325 TABLET ORAL at 08:50

## 2024-02-23 RX ADMIN — DOCUSATE SODIUM 100 MG: 100 CAPSULE, LIQUID FILLED ORAL at 11:39

## 2024-02-23 RX ADMIN — DIVALPROEX SODIUM 1000 MG: 500 TABLET, EXTENDED RELEASE ORAL at 21:37

## 2024-02-23 RX ADMIN — OMEGA-3 FATTY ACIDS CAP 1000 MG 1000 MG: 1000 CAP at 16:27

## 2024-02-23 RX ADMIN — OMEGA-3 FATTY ACIDS CAP 1000 MG 1000 MG: 1000 CAP at 21:37

## 2024-02-23 RX ADMIN — BACITRACIN ZINC, NEOMYCIN, POLYMYXIN B 1 SMALL APPLICATION: 400; 3.5; 5 OINTMENT TOPICAL at 11:43

## 2024-02-23 RX ADMIN — CHOLECALCIFEROL TAB 25 MCG (1000 UNIT) 2000 UNITS: 25 TAB at 11:39

## 2024-02-23 RX ADMIN — ARIPIPRAZOLE 15 MG: 15 TABLET ORAL at 08:16

## 2024-02-23 RX ADMIN — SERTRALINE HYDROCHLORIDE 50 MG: 50 TABLET ORAL at 21:37

## 2024-02-23 RX ADMIN — CLONAZEPAM 0.5 MG: 0.5 TABLET ORAL at 17:34

## 2024-02-23 RX ADMIN — Medication 1 TABLET: at 16:27

## 2024-02-23 RX ADMIN — HYDROCORTISONE 5 MG: 10 TABLET ORAL at 16:00

## 2024-02-23 RX ADMIN — OMEGA-3 FATTY ACIDS CAP 1000 MG 1000 MG: 1000 CAP at 11:39

## 2024-02-23 RX ADMIN — Medication 400 MG: at 17:34

## 2024-02-23 RX ADMIN — Medication 400 MG: at 11:39

## 2024-02-23 NOTE — PLAN OF CARE
Problem: DISCHARGE PLANNING  Goal: Discharge to home or other facility with appropriate resources  Description: INTERVENTIONS:  - Identify barriers to discharge w/patient and caregiver  - Arrange for needed discharge resources and transportation as appropriate  - Identify discharge learning needs (meds, wound care, etc.)  - Arrange for interpretive services to assist at discharge as needed  - Refer to Case Management Department for coordinating discharge planning if the patient needs post-hospital services based on physician/advanced practitioner order or complex needs related to functional status, cognitive ability, or social support system  Outcome: Progressing     Problem: SELF HARM/SUICIDALITY  Goal: Will have no self-injury during hospital stay  Description: INTERVENTIONS:  - Q 15 MINUTES: Routine safety checks  - Q WAKING SHIFT & PRN: Assess risk to determine if routine checks are adequate to maintain patient safety  - Encourage patient to participate actively in care by formulating a plan to combat response to suicidal ideation, identify supports and resources  Outcome: Progressing     Problem: BEHAVIOR  Goal: Pt/Family maintain appropriate behavior and adhere to behavioral management agreement, if implemented  Description: INTERVENTIONS:  - Assess the family dynamic   - Encourage verbalization of thoughts and concerns in a socially appropriate manner  - Assess patient/family's coping skills and non-compliant behavior (including use of illegal substances).  - Utilize positive, consistent limit setting strategies supporting safety of patient, staff and others  - Initiate consult with Case Management, Spiritual Care or other ancillary services as appropriate  - If a patient's/visitor's behavior jeopardizes the safety of the patient, staff, or others, refer to organization procedure.   - Notify Security of behavior or suspected illegal substances which indicate the need for search of the patient and/or  belongings  - Encourage participation in the decision making process about a behavioral management agreement; implement if patient meets criteria  Outcome: Progressing     Problem: INVOLUNTARY ADMIT  Goal: Will cooperate with staff recommendations and doctor's orders and will demonstrate appropriate behavior  Description: INTERVENTIONS:  - Treat underlying conditions and offer medication as ordered  - Educate regarding involuntary admission procedures and rules  - Utilize positive consistent limit setting strategies to support patient and staff safety  Outcome: Progressing     Problem: SELF CARE DEFICIT  Goal: Return ADL status to a safe level of function  Description: INTERVENTIONS:  - Administer medication as ordered  - Assess ADL deficits and provide assistive devices as needed  - Obtain PT/OT consults as needed  - Assist and instruct patient to increase activity and self care as tolerated  Outcome: Progressing     Problem: Ineffective Coping  Goal: Identifies ineffective coping skills  Outcome: Progressing  Goal: Demonstrates healthy coping skills  Outcome: Progressing  Goal: Participates in unit activities  Description: Interventions:  - Provide therapeutic environment   - Provide required programming   - Redirect inappropriate behaviors   Outcome: Progressing     Problem: Risk for Violence/Aggression Toward Others  Goal: Treatment Goal: Refrain from acts of violence/aggression during length of stay, and demonstrate improved impulse control at the time of discharge  Outcome: Progressing

## 2024-02-23 NOTE — NURSING NOTE
"Pt repeatedly at nursing station making numerous requests, tearful when redirected. Increasingly anxious and offered medication; pt declined, stated \"I will flip out if I take an anxiety med.\"  "

## 2024-02-23 NOTE — ASSESSMENT & PLAN NOTE
"Superior aspect of the left great toe with a healing wound with mild erythema   Xray noted \"No acute osseous abnormality. Ovoid soft tissue radiodensity measuring 1 mm in the plantar aspect of the first digit, possibly a tiny foreign body.\"  She does have some tenderness although no palpable foreign body noted   Add neosporin BID for 7 days   Will consult podiatry for further evaluation   "

## 2024-02-23 NOTE — NURSING NOTE
PT received in while in bed under her cover sleeping, PT later in the shift came due to increased stimulation unit almost in tearful for fears and anxiety.  Assured safety and continues rounding while in the unit. PT compliant with HS scheduled meds, no PRN needed at this time.

## 2024-02-23 NOTE — ASSESSMENT & PLAN NOTE
Patient is medically cleared for admission to U and treatment of underlying psychiatric illness based on available results  No acute medical needs. Medicine will sign off. Please call with additional questions or concerns
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
## 2024-02-23 NOTE — CONSULTS
[facility] - Consultation    Patient Information:   Yaritza Dunne 43 y.o. female MRN: [id number]  Unit/Bed#: [facility] 385-01 Encounter: [id number]  PCP: Luis Felipe Hankins MD  Date of Admission:  2/22/2024  Date of Consultation: 02/23/24  Requesting Physician: Baudilio Zimmerman MD      ASSESSMENT:    Yaritza Dunne is a 43 y.o. female with:    L hallux wound    PLAN:    Pt seen and evaluated   Recommend mupirocin DSD to hallux wound to be changed daily  Recommend oral abx for 7-10 days   Recommend surgical shoe to LF  Xrays reviewed - negative for any fractures, very small possible foreign body to deep plantar aspect of L hallux. No podiatric intervention indicated as this time  No further podiatric intervention as this time. Podiatry to sign off   Rest of care per primary team.    Antibiotics: oral 7-10 days  Weight Bearing Status: WBAT    SUBJECTIVE    History of Present Illness:    Yaritza Dunne is a 43 y.o. female with past medical history listed below who presents with LF pain and ulceration.     Review of Systems:    12 point review of systems is negative unless otherwise specified in the above HPI.    Past Medical and Surgical History:     Past Medical History:   Diagnosis Date    Adrenocortical insufficiency (HCC)     Bipolar disorder (HCC)     Disease of thyroid gland     hypo    Disorder of bone density and structure, unspecified     Epilepsy (HCC)     Hyperlipidemia     Hypopituitarism (HCC)     Mild intellectual disabilities     Psychiatric disorder     bipolar       Past Surgical History:   Procedure Laterality Date    EPIDURAL BLOCK INJECTION N/A 8/23/2023    Procedure: L3-L4  LUMBAR epidural steroid injection ([id number]);  Surgeon: Adam Hayden DO;  Location: [facility] MAIN OR;  Service: Pain Management        Meds/Allergies:    Medications Prior to Admission   Medication    acetaminophen (TYLENOL) 325 mg tablet    albuterol (PROVENTIL HFA,VENTOLIN HFA) 90 mcg/act inhaler    ARIPiprazole (ABILIFY) 15  "mg tablet    Calcium Carb-Cholecalciferol (CALCIUM 600-D PO)    clonazePAM (KlonoPIN) 0.5 mg tablet    clonazePAM (KlonoPIN) 1 mg tablet    divalproex sodium (DEPAKOTE ER) 500 mg 24 hr tablet    docusate sodium (COLACE) 100 mg capsule    furosemide (LASIX) 20 mg tablet    HYDROCORTISONE PO    ibuprofen (MOTRIN) 200 mg tablet    levothyroxine 112 mcg tablet    levothyroxine 112 mcg tablet    lidocaine (Lidoderm) 5 %    meclizine (ANTIVERT) 25 mg tablet    meloxicam (MOBIC) 15 mg tablet    Multiple Vitamin (MULTIVITAMIN) tablet    mupirocin (BACTROBAN) 2 % ointment    Refresh Tears 0.5 % SOLN    sertraline (ZOLOFT) 100 mg tablet    sertraline (ZOLOFT) 50 mg tablet    triamcinolone (KENALOG) 0.1 % cream    Calcium Carbonate-Vitamin D 500-125 MG-UNIT TABS    Cholecalciferol (VITAMIN D) 50 MCG (2000 UT) tablet    hydrocortisone (CORTEF) 10 mg tablet    Omega-3 Fatty Acids (FISH OIL) 1,000 mg    QUEtiapine (SEROquel XR) 50 mg    QUEtiapine (SEROquel) 50 mg tablet       Allergies   Allergen Reactions    Haldol [Haloperidol] Other (See Comments)     dyskenesia    Thioridazine        Social History:     Marital Status: Single    Substance Use History:   Social History     Substance and Sexual Activity   Alcohol Use Not Currently     Social History     Tobacco Use   Smoking Status Never   Smokeless Tobacco Never     Social History     Substance and Sexual Activity   Drug Use Not Currently       Family History:    History reviewed. No pertinent family history.      OBJECTIVE:    Vitals:   Blood Pressure: 116/72 (02/22/24 2025)  Pulse: 67 (02/22/24 2025)  Temperature: 98.3 °F (36.8 °C) (02/22/24 2025)  Temp Source: Temporal (02/22/24 2025)  Respirations: 16 (02/22/24 2025)  Height: 5' 5\" (165.1 cm) (02/22/24 1747)  Weight - Scale: 129 kg (285 lb) (02/22/24 1747)  SpO2: 98 % (02/22/24 2025)    Physical Exam:     General Appearance: Alert, cooperative, no distress.  HEENT: Head normocephalic, atraumatic, without obvious " abnormality.  Heart: Normal rate and rhythm.  Lungs: Non-labored breathing. No respiratory distress.  Abdomen: Without distension.  Psychiatric: AAOx3  Lower Extremity:  Vascular:   DP/PT pedal pulses on the left are present. DP/PT pedal pulses on the right are present. CRT brisk at the digits. Pedal hair is present. trace edema noted at bilateral lower extremities. Skin temperature is within normal limits bilaterally.    Musculoskeletal:  MMT is 5/5 in all muscle compartments bilaterally.     Dermatological:    LE Wound Exam:   Wound (1) located dorsal L hallux proximal to IPJ,   without sinus tracking or undermining. Wound bed appears granular with minimal drainage. The wound base is 75% red/granular, 25% yellow/fibrous, 0% black/necrotic. Wound depth is partial thickness. Malodor is not noticed. Wound edge appears epithelialized. Radha-wound skin appears erythematous. Probe to bone is negative . Signs of infection are present at this time.     Neurological:  Gross sensation is intact. Protective sensation is intact. Patient Denies numbness and/or paresthesias.    Clinical Images 02/23/24:      Additional Data:     Lab Results: I have personally reviewed pertinent labs including:    Results from last 7 days   Lab Units 02/23/24  0650   WBC Thousand/uL 5.10   HEMOGLOBIN g/dL 11.8   HEMATOCRIT % 35.4   PLATELETS Thousands/uL 182   NEUTROS PCT % 46   LYMPHS PCT % 48*   MONOS PCT % 6   EOS PCT % 0     Results from last 7 days   Lab Units 02/23/24  0649   POTASSIUM mmol/L 4.2   CHLORIDE mmol/L 104   CO2 mmol/L 30   BUN mg/dL 15   CREATININE mg/dL 0.56*   CALCIUM mg/dL 9.1   ALK PHOS U/L 48   ALT U/L 11   AST U/L 10*           Cultures: I have personally reviewed pertinent cultures including:              Imaging: I have personally reviewed pertinent films in PACS.  EKG, Pathology, and Other Studies: I have personally reviewed pertinent reports.
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
## 2024-02-23 NOTE — TREATMENT PLAN
TREATMENT PLAN REVIEW - Behavioral Health Yaritza Dunne 43 y.o. 1980 female MRN: [id number]    [facility] 3P BEHAVIORAL HLTH Room / Bed: [facility] 385/[facility] 385-01 Encounter: [id number]          Admit Date/Time:  2/22/2024  5:44 PM    Treatment Team:   MD Sarah Sparks LPC Isaiah Ortiz David McCoy, RN Jaidah Smith Marc Schneider, RN Danielle Merk Karissa Marie Kormandy, CRNP    Diagnosis: Principal Problem:    Unspecified mood (affective) disorder (HCC)  Active Problems:    Intermittent explosive disorder    Intellectual disability      Patient Strengths/Assets: family ties, negotiates basic needs    Patient Barriers/Limitations: limited support system, patient is on an involuntary commitment, poor insight, poor interpersonal skills, poor self-care    Short Term Goals: decrease in suicidal thoughts, decrease in self abusive behaviors, decrease in level of agitation, ability to stay safe on the unit, ability to stay free of restraints, improvement in insight    Long Term Goals: stabilization of mood, free of suicidal thoughts, free of homicidal thoughts, no self abusive behavior, improvement in reasoning ability    Progress Towards Goals: continue psychiatric medications as prescribed    Recommended Treatment: medication management, patient medication education, group therapy, milieu therapy, continued Behavioral Health psychiatric evaluation/assessment process    Treatment Frequency: daily medication monitoring, group and milieu therapy daily, monitoring through interdisciplinary rounds, monitoring through weekly patient care conferences    Expected Discharge Date:  10 days    Discharge Plan: referral for outpatient medication management with a psychiatrist, referral for outpatient psychotherapy    Treatment Plan Created/Updated By: Baudilio Zimmerman MD

## 2024-02-23 NOTE — CASE MANAGEMENT
SHANEKA had [facility] called about 303 petition. SHANEKA requested hearing be at 9:30 on Monday. [facility] representative shared she would attempt to coordinate Monday morning when attorneys are in.

## 2024-02-23 NOTE — ASSESSMENT & PLAN NOTE
Diffuse T wave inversion in V1-6  No prior EKGs for comparision   No chest pain  Recommend outpatient follow-up with cardiology

## 2024-02-23 NOTE — H&P
"Psychiatric Evaluation - Behavioral Health   Yaritza Dunne 43 y.o. female MRN: [id number]  Unit/Bed#: [facility] 385-01 Encounter: [id number]    Assessment/Plan   Principal Problem:    Unspecified mood (affective) disorder (HCC)  Active Problems:    Intellectual disability    Intermittent explosive disorder    Medical clearance for psychiatric admission    Epilepsy (HCC)    Adrenocortical insufficiency (HCC)    Hypothyroidism    Obesity    Dyslipidemia    Edema    Open toe wound    T wave inversion in EKG  Plan:   - Check blood Valproate level   - Reduce night-time clonazepam dose from 1mg to 0.5mg to reduce potential risk for paradoxical aggression  - Continue all other medications     All current active medications have been reviewed  Encourage group therapy, milieu therapy and occupational therapy  Behavioral Health checks every 7 minutes  Will observe if safe for discharge once 72 hour notice expires  Medical management per SLIM.  Discharge planning: Patient admitted on 302.  Collateral information as available.      Sissy Sewell 02/23/24     ------------------------------------------    Chief Complaint: My medication is not working, it needs a change.      History of Present Illness     Yaritza Dunne is a 43 y.o. female with a history of Bipolar Disorder type II and Epilepsy and mild intellectual disabilities  who was admitted to the inpatient psychiatric unit on a involuntary 302 commitment basis due to violent behavior and self-abusive behavior.    Symptoms prior to admission included difficulty sleeping, anger outbursts, difficulty controlling anger, and aggressive behavior. Stressors preceding admission included social difficulties and issues getting along with roommate at group home .    Yaritza states that she is here because her medication is not working and she needs a medication 'detox'. She reports she is having more \"blowouts\". Patient describes incident on 2/21 at her group home, when her roommate at her " "group home made her angry, leading her to punch a glass window. When asked if she injured herself during this incident, the patient responds \"I don't think so\". Per ER note, the group home reports that patient walked over the shattered glass and cut her feet, as well as took a shard of glass and attempted to cut her neck leading to several superficial abrasions over the neck. The group home initiated a 302 for this reason. When asked about the abrasions on her neck, the patient maintains that she did it because she 'was really mad' and continues to deny any suicidal ideation. When asked about reasons leading up to this outburst, patient repeats 'I just have a temper'. Patient reports that outside of her 'blowouts', she is feeling good and regularly participates in group activities at her group home.     The patient is unhappy because she was woken up overnight by patient in the next room, who had a loud outburst. Patient reports this caused her to stay up at night feeling very anxious and overwhelmed, and sat in the hallway crying. She denies a history of feeling anxious, and says her anxiety was related to feeling unsafe on this floor overnight. She repeats that her medication is not working and she needs it to be changed, and would like to go to the medical floor for this adjustment. Patient adds that she has not been sleeping very well as of late, tossing and turning and going to the bathroom several times overnight. She asks if she can have Nyquil before bed, as that has helped her in the past.     Patient unable to provide a full prior medical history. She does recall that she had 'a stroke at age 4, I turned blue', but does not remember the work up or diagnosis she received at this time. She reports she grew up in [place], living with her mother who still lives there, and has been living in different group homes which led her to move to [place]. Patient reports she has had >5 psychiatric admissions, all for 'the same' " reason as the current. Patient recently in ED (2/4) after getting angry at her group home and ingesting the plastic part of a thumbtack after removing the metal piece. At the time, she also denied intent to harm self, and did it during an episode of anger toward her roommate.     Psychiatric Review Of Systems:  sleep: yes, tossing and turning or going to the bathroom frequently.   appetite changes: no  weight changes: no  energy/anergy: no  interest/pleasure/anhedonia: no  somatic symptoms: no  anxiety/panic: no  opal: no  guilty/hopeless: no  self injurious behavior/risky behavior: Not at this time, but did exhibit self injurious behavior at group home, leading to current admission.     Historical Information     Past Psychiatric History:   Current outpatient providers: Dr. Luis Felipe Hankins, [facility]   Inpatient Admissions: Yes  Past Suicide attempts: Unable to obtain  Past Violent behavior: Yes  Past Psychiatric medication trial: Unable to obtain4    Substance Abuse History:  E-Cigarette/Vaping    E-Cigarette Use Never User       E-Cigarette/Vaping Substances    Nicotine No     THC No     CBD No     Flavoring No     Other No     Unknown No        Social History       Tobacco History       Smoking Status  Never      Smokeless Tobacco Use  Never              Alcohol History       Alcohol Use Status  Not Currently              Drug Use       Drug Use Status  Not Currently              Sexual Activity       Sexually Active  Not Currently              Activities of Daily Living    Not Asked                 Additional Substance Use Detail       Questions Responses    Problems Due to Past Use of Alcohol? No    Problems Due to Past Use of Substances? No    Substance Use Assessment Denies substance use within the past 12 months    Alcohol Use Frequency Denies use in past 12 months    Cannabis frequency Never used    Comment:  Never used on 2/22/2024     Heroin Frequency Denies use in past 12 months    Cocaine  frequency Never used    Comment:  Never used on 2/22/2024     Crack Cocaine Frequency Denies use in past 12 months    Methamphetamine Frequency Denies use in past 12 months    Narcotic Frequency Denies use in past 12 months    Benzodiazepine Frequency Denies use in past 12 months    Amphetamine frequency Denies use in past 12 months    Barbituate Frequency Denies use use in past 12 months    Inhalant frequency Never used    Comment:  Never used on 2/22/2024     Hallucinogen frequency Never used    Comment:  Never used on 2/22/2024     Ecstasy frequency Never used    Comment:  Never used on 2/22/2024     Other drug frequency Never used    Comment:  Never used on 2/22/2024     Opiate frequency Denies use in past 12 months    Last reviewed by Lissa Munoz RN on 2/22/2024          I have assessed this patient for substance use within the past 12 months    Alcohol use: denies use  Recreational drug use:   Cocaine:  denies use  Heroin:  denies use  Marijuana:  denies use  Other drugs: denies use   Longest clean time: not applicable  History of Inpatient/Outpatient rehabilitation program: no  Smoking history: denies use      Family Psychiatric History:   Unable to obtain    Social History:  Education: unable to obtain  Learning Disabilities: mild intellectual disability  Marital History: single  Children: none  Living Arrangement: lives in a group home  Occupational History: unemployed  Functioning Relationships: good relationship with mother  Legal History: none   History: None      Traumatic History:   Abuse:  Unable to obtain     Past Medical History:  Past Medical History:   Diagnosis Date    Adrenocortical insufficiency (HCC)     Bipolar disorder (HCC)     Disease of thyroid gland     hypo    Disorder of bone density and structure, unspecified     Epilepsy (HCC)     Hyperlipidemia     Hypopituitarism (HCC)     Mild intellectual disabilities     Psychiatric disorder     bipolar     History of Seizures:  "Has formal diagnosis, unable to obtain history   History of Head injury with loss of consciousness:  Patient reports history of cerebrovascular event at age 4.     Medical Review Of Systems:  Pertinent items are noted in HPI.    Meds/Allergies   Allergies   Allergen Reactions    Haldol [Haloperidol] Other (See Comments)     dyskenesia    Thioridazine        Objective   Vital signs in last 24 hours:  Temp:  [97.8 °F (36.6 °C)-98.3 °F (36.8 °C)] 98.3 °F (36.8 °C)  HR:  [65-86] 67  Resp:  [16-18] 16  BP: (116-130)/(68-75) 116/72    No intake or output data in the 24 hours ending 02/23/24 1206    Mental Status Evaluation:  Appearance:  dressed in hospital attire   Behavior:  pleasant, calm, guarded   Speech:  normal rate and volume   Mood:  \"Tired but feeling fine\"   Affect:  blunted   Thought Process:  perseverative, decreased rate of thoughts   Associations: perseverative   Thought Content:  no overt delusions   Perceptual Disturbances: none, does not appear responding to internal stimuli   Risk Potential: Suicidal ideation -  Act of self harm prior to admission, denies suicidal ideation.   Homicidal ideation - None  Potential for aggression - Yes, due to poor impulse control   Sensorium:  oriented to person, place, and time/date   Memory:  recent and remote memory: unable to assess due to lack of cooperation, says to \"ask her mother\".    Consciousness:  alert and awake   Attention/Concentration: attention span and concentration appear shorter than expected for age   Insight:  limited   Judgment: limited   Gait/Station: Right valgus knee    Motor Activity: no abnormal movements     Laboratory Results: I have personally reviewed all pertinent laboratory/tests results    Most Recent Labs:   Lab Results   Component Value Date    WBC 5.10 02/23/2024    RBC 4.09 02/23/2024    HGB 11.8 02/23/2024    HCT 35.4 02/23/2024     02/23/2024    RDW 12.6 02/23/2024    NEUTROABS 2.35 02/23/2024    SODIUM 141 02/23/2024    K " 4.2 02/23/2024     02/23/2024    CO2 30 02/23/2024    BUN 15 02/23/2024    CREATININE 0.56 (L) 02/23/2024    GLUC 87 02/23/2024    CALCIUM 9.1 02/23/2024    AST 10 (L) 02/23/2024    ALT 11 02/23/2024    ALKPHOS 48 02/23/2024    TP 5.3 (L) 02/23/2024    ALB 3.4 (L) 02/23/2024    TBILI 0.32 02/23/2024    CHOLESTEROL 149 02/23/2024    HDL 42 (L) 02/23/2024    TRIG 134 02/23/2024    LDLCALC 80 02/23/2024    NONHDLC 107 02/23/2024    VUQ3QCSLFNSM <0.010 (L) 02/23/2024    FREET4 1.02 02/23/2024     Drug Screen:   Lab Results   Component Value Date    AMPMETHUR Negative 02/22/2024    BARBTUR Negative 02/22/2024    BDZUR Negative 02/22/2024    THCUR Negative 02/22/2024    COCAINEUR Negative 02/22/2024    METHADONEUR Negative 02/22/2024    OPIATEUR Negative 02/22/2024    PCPUR Negative 02/22/2024       Imaging Studies: XR foot 3+ views LEFT    Result Date: 2/22/2024  Narrative: XR FOOT 3+ VW LEFT INDICATION: injury eval foreign body. COMPARISON: None FINDINGS: No acute fracture or dislocation. Chronic deformity in the distal tibia and fifth metatarsal. Degenerative changes at the ankle joint. No lytic or blastic osseous lesion. Mild dorsal soft tissue swelling. There is a 1 mm ovoid soft tissue radiodensity in the plantar aspect of the first digit.     Impression: No acute osseous abnormality. Ovoid soft tissue radiodensity measuring 1 mm in the plantar aspect of the first digit, possibly a tiny foreign body. Workstation performed: [id number]     XR foot 3+ views RIGHT    Result Date: 2/22/2024  Narrative: XR FOOT 3+ VW RIGHT INDICATION: injury - eval foreign body. COMPARISON: None FINDINGS: No acute fracture or dislocation. Unfused apophysis at the base of the fifth proximal phalanx in the calcaneus Degenerative changes in the ankle joint. No lytic or blastic osseous lesion. Mild dorsal soft tissue swelling. No radiopaque foreign body.     Impression: No acute osseous abnormality. Workstation performed: [id number]      XR hip/pelv 2-3 vws right if performed    Result Date: 2/22/2024  Narrative: XR HIP/PELV 2-3 VWS RIGHT W PELVIS IF PERFORMED INDICATION: injury. COMPARISON: Left hip radiographs 6/8/2023. FINDINGS: No acute fracture or dislocation. Moderate bilateral hip joint osteoarthritis. No lytic or blastic osseous lesion. Unremarkable soft tissues. Unremarkable visualized lumbar spine.     Impression: No acute osseous abnormality. Degenerative changes as described. Workstation performed: [id number]     XR wrist 3+ views LEFT    Result Date: 2/22/2024  Narrative: XR WRIST 3+ VW LEFT INDICATION: injury. COMPARISON: None FINDINGS: No acute fracture or dislocation. Partially unfused distal radial and ulnar apophyses. No significant degenerative changes. No lytic or blastic osseous lesion. Mild dorsal soft tissue swelling. No radiopaque foreign bodies.     Impression: No acute osseous abnormality. Workstation performed: [id number]     XR hand 3+ views LEFT    Result Date: 2/22/2024  Narrative: XR HAND 3+ VW LEFT INDICATION: injury. COMPARISON: None For the purposes of institution wide universal language the following terms will apply: (thumb=1st digit/finger, index finger=2nd digit/finger, long finger=3rd digit/finger, ring=4th digit/finger and small finger=5th digit/finger) FINDINGS: No acute fracture or dislocation. Partially unfused apophysis at the base of the first metacarpal No significant degenerative changes. No lytic or blastic osseous lesion. Mild dorsal soft tissue swelling. No radiopaque foreign body.     Impression: No acute osseous abnormality. Workstation performed: [id number]     XR abdomen 1 view kub    Result Date: 2/5/2024  Narrative: XR ABDOMEN 1 VIEW KUB INDICATION: Patient reports ingesting a thumbtack. Evaluation for foreign body. COMPARISON: CT of the chest, abdomen, and pelvis from the same day. FINDINGS: No dilated or inflamed loops of small or large bowel. Gas and stool in the colon extends to the  rectum. No evidence of pneumoperitoneum on this supine study.  Upright or left lateral decubitus imaging is more sensitive to detect subtle free air in the appropriate setting. No pathologic calcification or soft tissue mass. Clear lung bases. Symmetrically demineralized appearance of the iliac crest apophyses--chronic. No acute osseous abnormalities. No radiopaque foreign bodies.     Impression: No radiopaque foreign bodies or other acute abdominal abnormalities. Workstation performed: [id number]     XR chest 1 view portable    Result Date: 2/5/2024  Narrative: XR CHEST PORTABLE INDICATION: Patient reports ingesting a thumbtack. Evaluation for foreign body. COMPARISON: CT of the chest, abdomen, and pelvis from the same day. Pelvic radiographs dated 6/8/2023. FINDINGS: Clear lungs. No pneumothorax or pleural effusion. Normal cardiomediastinal silhouette. Bones are unremarkable for age. Normal upper abdomen. No radiopaque foreign body is evident.     Impression: No radiopaque foreign bodies or other acute cardiopulmonary findings. Workstation performed: [id number]     CT chest abdomen pelvis wo contrast    Result Date: 2/5/2024  Narrative: CT CHEST, ABDOMEN AND PELVIS WITHOUT IV CONTRAST INDICATION: reported swallowed thumbtack. COMPARISON: None. TECHNIQUE: CT examination of the chest, abdomen and pelvis was performed without intravenous contrast. Multiplanar 2D reformatted images were created from the source data. This examination, like all CT scans performed in the [facility], was performed utilizing techniques to minimize radiation dose exposure, including the use of iterative reconstruction and automated exposure control. Radiation dose length product (DLP) for this visit: 1288 mGy-cm Enteric Contrast: Not administered. FINDINGS: CHEST LUNGS: Lungs are clear. No tracheal or endobronchial lesion. PLEURA: Unremarkable. HEART/GREAT VESSELS: Heart is unremarkable for patient's age. No thoracic  aortic aneurysm. MEDIASTINUM AND CLARY: Unremarkable. CHEST WALL AND LOWER NECK: Unremarkable. ABDOMEN LIVER/BILIARY TREE: Unremarkable. GALLBLADDER: No calcified gallstones. No pericholecystic inflammatory change. SPLEEN: Unremarkable. PANCREAS: There is a 2 cm area of relative hypodense parenchyma within the pancreatic body series 2/95. No atrophy. No duct dilation. ADRENAL GLANDS: Unremarkable. KIDNEYS/URETERS: Unremarkable. No hydronephrosis. STOMACH AND BOWEL: Unremarkable. APPENDIX: Normal. ABDOMINOPELVIC CAVITY: No ascites. No pneumoperitoneum. No lymphadenopathy. VESSELS: Unremarkable for patient's age. PELVIS REPRODUCTIVE ORGANS: Post hysterectomy. URINARY BLADDER: Decompressed ABDOMINAL WALL/INGUINAL REGIONS: Unremarkable. BONES: Abnormal appearance throughout the skeleton, with areas of normal appearing cortex adjacent to overlying and more lucent appearing bone, this is exemplified in the inferior pubic rami when referencing series 601/97. This is apparent throughout the  pelvis, spine, and the sternum.     Impression: 1.  No radiopaque abnormality resembling a thumbtack 2.  Possible lesion within the pancreatic body. Suggest follow-up MRI. However, given current history, chest and abdominal x-rays should be performed prior. Alternatively, multiphase CT can be performed 3.  Abnormal appearance of the skeletal system as described. The appearance is not typical of any syndromic abnormality. This may be a result of a prior period of malnutrition/deficiency within adolescence, followed by normal period, which has resulted in a dysmorphic portion of bone adjacent to more normal-appearing bone This study was marked in EPIC for significant notification.. Workstation performed: [id number]       Code Status: Level 1 - Full Code  Advance Directive and Living Will: <no information>      Patient Strengths/Assets: cooperative, compliant with medication    Patient Barriers/Limitations: poor insight, history of angry  outbursts.     Risks / Benefits of Treatment:    Risks, benefits, and possible side effects of medications explained to patient and patient verbalizes understanding and agreement for treatment.    Counseling / Coordination of Care:    Total floor / unit time spent today 45 minutes. Greater than 50% of total time was spent with the patient and / or family counseling and / or coordination of care. A description of the counseling / coordination of care:   Patient's presentation on admission and proposed treatment plan discussed with treatment team.  Diagnosis, medication changes and treatment plan reviewed with patient.  Events leading to admission reviewed with patient.  Importance of medication and treatment compliance reviewed with patient.  Supportive therapy provided to patient.    Inpatient Psychiatric Certification:    Estimated length of stay: 5 midnights    Estimated length of stay: More than 2 midnights.    Sissy Sewell 02/23/24   MS-3 [facility]

## 2024-02-23 NOTE — ASSESSMENT & PLAN NOTE
Chronic lower extremity edema  Continue home lasix 20 mg daily  Encourage ambulation and leg elevation

## 2024-02-23 NOTE — NURSING NOTE
Wound dressing change applied to L toe. Wound bed is on superior aspect of toe, appears free of odor and drainage. There is a dry scab with no bleeding. Edges are well approximated. Periwound is mildly erythemic, clean and intact and normal temp. Pt tolerated dressing change without difficulty. There is a small one inch scratch on lateral plane of toe. Pt denies pain at site.

## 2024-02-23 NOTE — NURSING NOTE
"Pt verbally denies SI, HI and A/V hallucinations; verbalizes moderate anxiety. Content is preoccupied with medical condition, wrist, legs, sore throat. Expressed desire to \"go to medical\". RN informed pt medical provider will consult with her and discuss those issues. Presents with BLLE edema +4, unsteady gait. Fall safety education discussed with pt, pt states she feels steady walking typically. Pt is limited and displays neediness, frequently interrupted RN while another pt was being met with. No signs of irritability, psychosis or thoughts to self harm.           "

## 2024-02-23 NOTE — NURSING NOTE
R immobilizer placed. Pt educated in how to use. Assisted with standing up and ambulating safety.

## 2024-02-23 NOTE — CASE MANAGEMENT
"Admission Status    Status of admission [address] [place] [address]     Patient Intake   Address to discharge to [address], [place].      Living Arrangement Resides at [facility].     Can patient return home    Patient's Telephone Number [phone]    Patient's e-mail Address None   Insurance MEDICARE/MEDICARE A AND B   [address]   PCP Luis Felipe Hankins MD     General - Family Medicine    [phone]       Education \"\"   Type of work Unemployed,     History None   Access to Firearms None   Marital Status/Children single   Spirituality/Catholic [employer]   Transportation Resides at group [facility]   Preferred Pharmacy      Patient History   Presenting Problem Became upset with another resident at the group [facility] where she lives.     Per the [facility] staff, the patient injured her hip by bumping into furniture and subsequently punched a window with her left hand.  Patient walked on glass, injuring her feet.  Following this, patient took shards of glass and attempted to cut her neck, incurring several scratches but no lacerations.   Stressor/Trigger Interpersonal conflict with peer   Treatment History    Current psychiatrist/therapist Has a psychiatrist, is unable to recall provider name..    ACT/ICM \"I don't know\"   Family History of Mental Health \"I don't know you'll have to ask my mom\"   Suicide Attempts Denies   Legal Issues None reported   Trauma/Psychosocial loss \"I don't know you'll have to ask my mom\"     Substance Abuse Assessment   UDS: negative  Audit Score: 0  Nicotine/Tobacco: None   Substance First use Last Use and amount Frequency Amount Used How long Longest period of sobriety and when Method of use   THC   Denies         Heroin   Denies         Cocaine   Denies         ETOH   Denies         Meth   Denies         Benzos   Denies         Other:   Denies         History of LENIN Denies   Family History of LENIN Unsure   Prior Inpatient LENIN " Treatment Denies   Current Outpatient treatment Denies   Response to Referral Denies     Referrals/ROIs   Referrals Needed PCP, group home, mother   ROIs Signed Yes

## 2024-02-23 NOTE — NURSING NOTE
Received written consent from pt that they will allow RN and all relevant staff to discuss all treatment issues without restriction with [facility]. Signed at 12:25.    Phone number: [phone].

## 2024-02-24 LAB — S PYO DNA THROAT QL NAA+PROBE: NOT DETECTED

## 2024-02-24 PROCEDURE — 99232 SBSQ HOSP IP/OBS MODERATE 35: CPT | Performed by: STUDENT IN AN ORGANIZED HEALTH CARE EDUCATION/TRAINING PROGRAM

## 2024-02-24 PROCEDURE — 87651 STREP A DNA AMP PROBE: CPT | Performed by: NURSE PRACTITIONER

## 2024-02-24 RX ORDER — CEPHALEXIN 500 MG/1
500 CAPSULE ORAL EVERY 6 HOURS SCHEDULED
Status: DISCONTINUED | OUTPATIENT
Start: 2024-02-24 | End: 2024-02-28 | Stop reason: HOSPADM

## 2024-02-24 RX ORDER — SACCHAROMYCES BOULARDII 250 MG
250 CAPSULE ORAL 2 TIMES DAILY
Status: DISCONTINUED | OUTPATIENT
Start: 2024-02-24 | End: 2024-02-28 | Stop reason: HOSPADM

## 2024-02-24 RX ADMIN — Medication 400 MG: at 08:50

## 2024-02-24 RX ADMIN — B-COMPLEX W/ C & FOLIC ACID TAB 1 TABLET: TAB at 08:50

## 2024-02-24 RX ADMIN — LEVOTHYROXINE SODIUM 336 MCG: 112 TABLET ORAL at 06:22

## 2024-02-24 RX ADMIN — CLONAZEPAM 0.5 MG: 0.5 TABLET ORAL at 17:42

## 2024-02-24 RX ADMIN — Medication 1 SPRAY: at 10:02

## 2024-02-24 RX ADMIN — HYDROCORTISONE 15 MG: 10 TABLET ORAL at 08:49

## 2024-02-24 RX ADMIN — HYDROCORTISONE 5 MG: 10 TABLET ORAL at 15:48

## 2024-02-24 RX ADMIN — Medication 400 MG: at 17:42

## 2024-02-24 RX ADMIN — SERTRALINE HYDROCHLORIDE 50 MG: 50 TABLET ORAL at 21:06

## 2024-02-24 RX ADMIN — SERTRALINE HYDROCHLORIDE 100 MG: 100 TABLET ORAL at 08:49

## 2024-02-24 RX ADMIN — OMEGA-3 FATTY ACIDS CAP 1000 MG 1000 MG: 1000 CAP at 15:48

## 2024-02-24 RX ADMIN — CLONAZEPAM 0.5 MG: 0.5 TABLET ORAL at 08:50

## 2024-02-24 RX ADMIN — OMEGA-3 FATTY ACIDS CAP 1000 MG 1000 MG: 1000 CAP at 08:49

## 2024-02-24 RX ADMIN — DOCUSATE SODIUM 100 MG: 100 CAPSULE, LIQUID FILLED ORAL at 08:50

## 2024-02-24 RX ADMIN — ALBUTEROL SULFATE 2 PUFF: 90 AEROSOL, METERED RESPIRATORY (INHALATION) at 06:24

## 2024-02-24 RX ADMIN — FUROSEMIDE 20 MG: 20 TABLET ORAL at 08:49

## 2024-02-24 RX ADMIN — Medication 250 MG: at 17:49

## 2024-02-24 RX ADMIN — OMEGA-3 FATTY ACIDS CAP 1000 MG 1000 MG: 1000 CAP at 21:07

## 2024-02-24 RX ADMIN — Medication 1 TABLET: at 08:50

## 2024-02-24 RX ADMIN — MUPIROCIN 1 APPLICATION: 20 OINTMENT TOPICAL at 08:50

## 2024-02-24 RX ADMIN — ALBUTEROL SULFATE 2 PUFF: 90 AEROSOL, METERED RESPIRATORY (INHALATION) at 01:54

## 2024-02-24 RX ADMIN — CHOLECALCIFEROL TAB 25 MCG (1000 UNIT) 2000 UNITS: 25 TAB at 08:50

## 2024-02-24 RX ADMIN — ACETAMINOPHEN 650 MG: 325 TABLET ORAL at 15:48

## 2024-02-24 RX ADMIN — ARIPIPRAZOLE 15 MG: 15 TABLET ORAL at 08:50

## 2024-02-24 RX ADMIN — Medication 1 TABLET: at 17:45

## 2024-02-24 RX ADMIN — ACETAMINOPHEN 650 MG: 325 TABLET ORAL at 09:50

## 2024-02-24 RX ADMIN — CEPHALEXIN 500 MG: 500 CAPSULE ORAL at 13:51

## 2024-02-24 RX ADMIN — CEPHALEXIN 500 MG: 500 CAPSULE ORAL at 17:42

## 2024-02-24 RX ADMIN — DIVALPROEX SODIUM 1250 MG: 500 TABLET, EXTENDED RELEASE ORAL at 21:07

## 2024-02-24 RX ADMIN — Medication 250 MG: at 13:51

## 2024-02-24 RX ADMIN — CYANOCOBALAMIN TAB 1000 MCG 1000 MCG: 1000 TAB at 13:54

## 2024-02-24 NOTE — NURSING NOTE
Patient was visible out of their room most of the evening. Somatic and anxious. Denies depression, SI, HI, and hallucinations.    Complained of throat discomfort. Temperature within normal limits and denies any issues with airway. Medication compliant. Retreated to bed on their own without any issues.

## 2024-02-24 NOTE — NURSING NOTE
Patient encouraged to retreated to bed several times. Patient instead colored in their bed and the patient lounge quietly throughout the night. Patient offered PRN sleep medication but they refused.

## 2024-02-24 NOTE — QUICK NOTE
Notified by nursing regarding patient's complaint of sore throat and difficulty swallowing.  Strep throat negative.  Added Chloraseptic spray for comfort.  Initially ordered soft diet and swallow eval, however, patient declined.    Additionally, reviewed podiatry's recommendations.  Will add Keflex for 7 days for left foot wound and possible cellulitis.

## 2024-02-24 NOTE — PROGRESS NOTES
Admission Self Assessment was given to patient to complete, sign and return to this therapist.    Patient was reminded to complete the Admission Self Assessment form.

## 2024-02-24 NOTE — NURSING NOTE
Yaritza is child-like, somatically preoccupied, repetitive, circumstantial, perseverative, and frequently coming to staff with requests and questions. She is preoccupied with needing surgery for her left wrist but stops speaking about it when reminded of results. She uses a splint for the left wrist which has some swelling. Right knee brace applied for leg deformity. She complains of sore throat claiming she has strep, Strep test performed with negative result. Chloraseptic throat spray ordered and given with positive effect, tylenol given for throat pain, effective. Wound care to left toe complete, wound is scabbing. Pt walks with limped, altered gait, visible on the milieu watching tv at this time.

## 2024-02-24 NOTE — NURSING NOTE
Patient originally requested medication for sleep. As this writer was preparing medication patient then expressed that they did not need the sleep medication but rather they have asthma and are in need of their inhaler. Patient requested and received PRN albuterol inhaler for wheezing at 0154. At 0254 patient reports inhaler helped, medication effective.

## 2024-02-24 NOTE — PLAN OF CARE
Problem: SELF HARM/SUICIDALITY  Goal: Will have no self-injury during hospital stay  Description: INTERVENTIONS:  - Q 15 MINUTES: Routine safety checks  - Q WAKING SHIFT & PRN: Assess risk to determine if routine checks are adequate to maintain patient safety  - Encourage patient to participate actively in care by formulating a plan to combat response to suicidal ideation, identify supports and resources  Outcome: Progressing     Problem: BEHAVIOR  Goal: Pt/Family maintain appropriate behavior and adhere to behavioral management agreement, if implemented  Description: INTERVENTIONS:  - Assess the family dynamic   - Encourage verbalization of thoughts and concerns in a socially appropriate manner  - Assess patient/family's coping skills and non-compliant behavior (including use of illegal substances).  - Utilize positive, consistent limit setting strategies supporting safety of patient, staff and others  - Initiate consult with Case Management, Spiritual Care or other ancillary services as appropriate  - If a patient's/visitor's behavior jeopardizes the safety of the patient, staff, or others, refer to organization procedure.   - Notify Security of behavior or suspected illegal substances which indicate the need for search of the patient and/or belongings  - Encourage participation in the decision making process about a behavioral management agreement; implement if patient meets criteria  Outcome: Progressing     Problem: INVOLUNTARY ADMIT  Goal: Will cooperate with staff recommendations and doctor's orders and will demonstrate appropriate behavior  Description: INTERVENTIONS:  - Treat underlying conditions and offer medication as ordered  - Educate regarding involuntary admission procedures and rules  - Utilize positive consistent limit setting strategies to support patient and staff safety  Outcome: Progressing     Problem: SELF CARE DEFICIT  Goal: Return ADL status to a safe level of function  Description:  INTERVENTIONS:  - Administer medication as ordered  - Assess ADL deficits and provide assistive devices as needed  - Obtain PT/OT consults as needed  - Assist and instruct patient to increase activity and self care as tolerated  Outcome: Progressing     Problem: Ineffective Coping  Goal: Identifies ineffective coping skills  Outcome: Progressing  Goal: Demonstrates healthy coping skills  Outcome: Progressing     Problem: Risk for Violence/Aggression Toward Others  Goal: Treatment Goal: Refrain from acts of violence/aggression during length of stay, and demonstrate improved impulse control at the time of discharge  Outcome: Progressing

## 2024-02-24 NOTE — PROGRESS NOTES
"Progress Note - Behavioral Health   Yaritza Dunne 43 y.o. female MRN: [id number]  Unit/Bed#: [facility] 374-02 Encounter: [id number]    Assessment/Plan   Principal Problem:    Unspecified mood (affective) disorder (HCC)  Active Problems:    Intermittent explosive disorder    Intellectual disability    Medical clearance for psychiatric admission    Epilepsy (HCC)    Adrenocortical insufficiency (HCC)    Hypothyroidism    Obesity    Dyslipidemia    Edema    Open toe wound    T wave inversion in EKG    Recommended Treatment:   Increase Depakote to 1250 mg at bedtime for mood stabilization.  CMP/CBC/VPA level in 2/26/2024  Continue current medications.  Group, individual, milieu therapy.  Continue behavioral health checks per routine.  Medical management per SLIM.  Discharge planning    ----------------------------------------      Subjective: Per nursing report Patient seen by podiatry team patient seen by podiatry team recommended an oral antibiotic for patient seen by podiatry team and started on antibiotic for foot wound.  She has been somewhat intrusive, making numerous requests and nursing station, somewhat labile and tearful at times although able to be redirected.  Given in the immobilizer for her right leg.    On examination today she is pleasant and cooperative albeit somewhat perseverative on discharge and receiving \"wrist surgery\".  She states that she feels as though \"the medication adjustments are doing great\".  She reports that she continues to have intermittent periods in which she feels sad and down although feels that she has not had any \"blowouts\" while on the unit\".  No issues with irritability or agitation.  She states that she slept somewhat better last night, some intermittent awakenings due to getting adjusted to the unit and being in a new area.  Reports that appetite levels are stable and adequate.  Denies any auditory or visual hallucinations at this time although does state that she had seen \"some " "strange lights and pictures outside my window\".  Denies any SI, HI, AVH presently.    Behavior over the last 24 hours:  unchanged  Sleep: normal  Appetite: normal  Medication side effects: No  ROS: no complaints    Mental Status Evaluation:  Appearance:  marginal hygiene   Behavior:  cooperative   Speech:  slow, soft   Mood:  \"Good\"   Affect:  blunted, mood-incongruent   Thought Process:  decreased rate of thoughts, slowing of thoughts   Associations: concrete associations   Thought Content:  no overt delusions, ruminations   Perceptual Disturbances: no auditory hallucinations, no visual hallucinations, does not appear responding to internal stimuli   Risk Potential: Suicidal ideation - None at present  Homicidal ideation - None at present  Potential for aggression - Yes, due to acute psychosis   Sensorium:  oriented to person, place, and time/date   Memory:  recent and remote memory grossly intact   Consciousness:  alert and awake   Attention/Concentration: decreased concentration and decreased attention span   Insight:  limited   Judgment: limited   Gait/Station: normal gait/station   Motor Activity: no abnormal movements     Medications: all current active meds have been reviewed.  Current Facility-Administered Medications   Medication Dose Route Frequency Provider Last Rate    acetaminophen  650 mg Oral Q6H PRN Baudilio Zimmerman MD      acetaminophen  650 mg Oral Q4H PRN Baudilio Zimmerman MD      acetaminophen  975 mg Oral Q6H PRN Baudilio Zimmerman MD      albuterol  2 puff Inhalation Q4H PRN LENO Hollins      aluminum-magnesium hydroxide-simethicone  30 mL Oral Q4H PRN Baudilio Zimmerman MD      ARIPiprazole  15 mg Oral Daily Baudilio Zimmerman MD      Artificial Tears  1 drop Both Eyes Q3H PRN Baudilio Zimmerman MD      calcium carbonate-vitamin D  1 tablet Oral BID With Meals LENO Hollins      cholecalciferol  2,000 Units Oral Daily LENO Hollins      clonazePAM  0.5 mg Oral BID Baudilio Zimmerman MD      " hydrOXYzine HCL  50 mg Oral Q6H PRN Max 4/day Baudilio Zimmerman MD      Or    diphenhydrAMINE  50 mg Intramuscular Q6H PRN Baudilio Zimmerman MD      divalproex sodium  1,000 mg Oral HS Baudilio Zimmerman MD      docusate sodium  100 mg Oral Daily LENO Hollins      fish oil  1,000 mg Oral TID LENO Hollins      furosemide  20 mg Oral Daily Baudilio Zimmerman MD      hydrocortisone  15 mg Oral Daily With Breakfast Baudilio Zimmerman MD      hydrocortisone  5 mg Oral Daily Baudilio Zimmerman MD      hydrOXYzine HCL  100 mg Oral Q6H PRN Max 4/day Baudilio Zimmerman MD      Or    LORazepam  2 mg Intramuscular Q6H PRN Baudilio Zimmerman MD      hydrOXYzine HCL  25 mg Oral Q6H PRN Max 4/day Baudilio Zimmerman MD      levothyroxine  224 mcg Oral Once per day on Monday Tuesday Wednesday Thursday Friday Baudilio Zimmerman MD      levothyroxine  336 mcg Oral Once per day on Sunday Saturday Baudilio Zimmerman MD      magnesium Oxide  400 mg Oral BID LENO Hollins      melatonin  3 mg Oral HS PRN Baudilio Zimmerman MD      multivitamin stress formula  1 tablet Oral Daily LENO Hollins      mupirocin   Topical Daily Radha Brown DPM      OLANZapine  10 mg Oral Q3H PRN Max 3/day Baudilio Zimmerman MD      Or    OLANZapine  10 mg Intramuscular Q3H PRN Max 3/day Baudilio Zimmerman MD      OLANZapine  5 mg Oral Q3H PRN Max 6/day Baudilio Zimmerman MD      Or    OLANZapine  5 mg Intramuscular Q3H PRN Max 6/day Baudilio Zimmerman MD      OLANZapine  2.5 mg Oral Q3H PRN Max 8/day Baudilio Zimmerman MD      phenol  1 spray Mouth/Throat Q2H PRN LENO Hollins      polyethylene glycol  17 g Oral Daily PRN Baudilio Zimmerman MD      senna-docusate sodium  1 tablet Oral Daily PRN Baudilio Zimmerman MD      sertraline  100 mg Oral Daily Baudilio Zimmerman MD      sertraline  50 mg Oral HS Baudilio Zimmerman MD         Labs: I have personally reviewed all pertinent laboratory/tests results  Most Recent Labs:   Lab Results   Component Value Date    WBC 5.10 02/23/2024    RBC 4.09 02/23/2024     HGB 11.8 02/23/2024    HCT 35.4 02/23/2024     02/23/2024    RDW 12.6 02/23/2024    NEUTROABS 2.35 02/23/2024    SODIUM 141 02/23/2024    K 4.2 02/23/2024     02/23/2024    CO2 30 02/23/2024    BUN 15 02/23/2024    CREATININE 0.56 (L) 02/23/2024    GLUC 87 02/23/2024    CALCIUM 9.1 02/23/2024    AST 10 (L) 02/23/2024    ALT 11 02/23/2024    ALKPHOS 48 02/23/2024    TP 5.3 (L) 02/23/2024    ALB 3.4 (L) 02/23/2024    TBILI 0.32 02/23/2024    CHOLESTEROL 149 02/23/2024    HDL 42 (L) 02/23/2024    TRIG 134 02/23/2024    LDLCALC 80 02/23/2024    NONHDLC 107 02/23/2024    VALPROICTOT 61 02/23/2024    [id number] <0.010 (L) 02/23/2024    FREET4 1.02 02/23/2024    SYPHILISAB Non-reactive 02/23/2024       Progress Toward Goals: progressing    Risks / Benefits of Treatment:    Risks, benefits, and possible side effects of medications explained to patient and patient verbalizes understanding and agreement for treatment.    Counseling / Coordination of Care:    Total floor / unit time spent today 25 minutes. Greater than 50% of total time was spent with the patient and / or family counseling and / or coordination of care. A description of counseling / coordination of care:  Patient's progress discussed with staff in treatment team meeting.  Medications, treatment progress and treatment plan reviewed with patient.  Reassurance and supportive therapy provided.  Encouraged participation in milieu and group therapy on the unit.    Baudilio Zimmerman MD 02/24/24'

## 2024-02-25 PROCEDURE — 99232 SBSQ HOSP IP/OBS MODERATE 35: CPT | Performed by: STUDENT IN AN ORGANIZED HEALTH CARE EDUCATION/TRAINING PROGRAM

## 2024-02-25 RX ORDER — FLUTICASONE PROPIONATE 50 MCG
1 SPRAY, SUSPENSION (ML) NASAL DAILY
Status: DISCONTINUED | OUTPATIENT
Start: 2024-02-25 | End: 2024-02-28 | Stop reason: HOSPADM

## 2024-02-25 RX ORDER — PSEUDOEPHEDRINE HCL 30 MG
30 TABLET ORAL EVERY 6 HOURS PRN
Status: COMPLETED | OUTPATIENT
Start: 2024-02-25 | End: 2024-02-26

## 2024-02-25 RX ADMIN — B-COMPLEX W/ C & FOLIC ACID TAB 1 TABLET: TAB at 08:46

## 2024-02-25 RX ADMIN — CEPHALEXIN 500 MG: 500 CAPSULE ORAL at 06:08

## 2024-02-25 RX ADMIN — CHOLECALCIFEROL TAB 25 MCG (1000 UNIT) 2000 UNITS: 25 TAB at 08:46

## 2024-02-25 RX ADMIN — DIVALPROEX SODIUM 1250 MG: 500 TABLET, EXTENDED RELEASE ORAL at 21:10

## 2024-02-25 RX ADMIN — CLONAZEPAM 0.5 MG: 0.5 TABLET ORAL at 08:46

## 2024-02-25 RX ADMIN — HYDROCORTISONE 15 MG: 10 TABLET ORAL at 08:45

## 2024-02-25 RX ADMIN — Medication 250 MG: at 17:23

## 2024-02-25 RX ADMIN — LEVOTHYROXINE SODIUM 336 MCG: 112 TABLET ORAL at 06:08

## 2024-02-25 RX ADMIN — ALBUTEROL SULFATE 2 PUFF: 90 AEROSOL, METERED RESPIRATORY (INHALATION) at 08:45

## 2024-02-25 RX ADMIN — CLONAZEPAM 0.5 MG: 0.5 TABLET ORAL at 17:23

## 2024-02-25 RX ADMIN — ARIPIPRAZOLE 15 MG: 15 TABLET ORAL at 08:46

## 2024-02-25 RX ADMIN — CEPHALEXIN 500 MG: 500 CAPSULE ORAL at 13:06

## 2024-02-25 RX ADMIN — PSEUDOEPHEDRINE HCL 30 MG: 30 TABLET, FILM COATED ORAL at 22:02

## 2024-02-25 RX ADMIN — CEPHALEXIN 500 MG: 500 CAPSULE ORAL at 23:02

## 2024-02-25 RX ADMIN — HYDROCORTISONE 5 MG: 10 TABLET ORAL at 15:38

## 2024-02-25 RX ADMIN — DOCUSATE SODIUM 100 MG: 100 CAPSULE, LIQUID FILLED ORAL at 08:46

## 2024-02-25 RX ADMIN — Medication 400 MG: at 08:46

## 2024-02-25 RX ADMIN — FUROSEMIDE 20 MG: 20 TABLET ORAL at 08:46

## 2024-02-25 RX ADMIN — CEPHALEXIN 500 MG: 500 CAPSULE ORAL at 17:23

## 2024-02-25 RX ADMIN — OMEGA-3 FATTY ACIDS CAP 1000 MG 1000 MG: 1000 CAP at 15:39

## 2024-02-25 RX ADMIN — MUPIROCIN 1 APPLICATION: 20 OINTMENT TOPICAL at 08:46

## 2024-02-25 RX ADMIN — CYANOCOBALAMIN TAB 1000 MCG 1000 MCG: 1000 TAB at 08:46

## 2024-02-25 RX ADMIN — SERTRALINE HYDROCHLORIDE 50 MG: 50 TABLET ORAL at 21:11

## 2024-02-25 RX ADMIN — Medication 250 MG: at 08:46

## 2024-02-25 RX ADMIN — SERTRALINE HYDROCHLORIDE 100 MG: 100 TABLET ORAL at 08:46

## 2024-02-25 RX ADMIN — Medication 400 MG: at 17:23

## 2024-02-25 RX ADMIN — OMEGA-3 FATTY ACIDS CAP 1000 MG 1000 MG: 1000 CAP at 21:10

## 2024-02-25 RX ADMIN — ALUMINUM HYDROXIDE, MAGNESIUM HYDROXIDE, AND DIMETHICONE 30 ML: 200; 20; 200 SUSPENSION ORAL at 18:05

## 2024-02-25 RX ADMIN — OMEGA-3 FATTY ACIDS CAP 1000 MG 1000 MG: 1000 CAP at 08:46

## 2024-02-25 RX ADMIN — Medication 1 TABLET: at 17:25

## 2024-02-25 RX ADMIN — FLUTICASONE PROPIONATE 1 SPRAY: 50 SPRAY, METERED NASAL at 22:02

## 2024-02-25 RX ADMIN — Medication 1 TABLET: at 08:46

## 2024-02-25 RX ADMIN — CEPHALEXIN 500 MG: 500 CAPSULE ORAL at 00:19

## 2024-02-25 NOTE — NURSING NOTE
Patient was visible out of their room this evening. Retreated to bed early on in the evening on their own without any issues. Denies depression, anxiety, SI, HI, or hallucinations of any kind. Medication compliant. Denies any unmet needs or concerns at this time.

## 2024-02-25 NOTE — NURSING NOTE
Pt upset over flat soda. Perseverating on soda and complaining of nausea and upset stomach. Given maalox at 1805 for symptoms. Intervention effective at 1900 reassessment. She denies depression, anxiety, SI/HI/AVH. She is increasingly somatically preoccupied and labile with a tearful and dysphoric mood complaining of nasal congestion and headache. Patient had several crying spells asking others for hugs. She says she is sad that another peer is wearing green scrubs and that another peer is like a father to her. She has limited coping skills and says she acts like this when not feeling well. Medical provider notified and ordered fluticasone for congestion symptoms. Patient offered atarax but refused. Given PRN sudafed for congestion at 2202.    Later, patient seen coloring with peer in dayroom no longer upset. Sudafed effective for congestion at 2233.

## 2024-02-25 NOTE — PROGRESS NOTES
"Progress Note - Behavioral Health   Yaritza Dunne 43 y.o. female MRN: [id number]  Unit/Bed#: [facility] 374-02 Encounter: [id number]    Assessment/Plan   Principal Problem:    Unspecified mood (affective) disorder (HCC)  Active Problems:    Intermittent explosive disorder    Intellectual disability    Medical clearance for psychiatric admission    Epilepsy (HCC)    Adrenocortical insufficiency (HCC)    Hypothyroidism    Obesity    Dyslipidemia    Edema    Open toe wound    T wave inversion in EKG    Recommended Treatment:   VPA level and CBC/CMP pending for tomorrow evening.  Continue current medications.  Group, individual, milieu therapy.  Continue behavioral health checks per routine.  Medical management per SLIM.  Discharge planning    ----------------------------------------      Subjective: Per nursing report patient has been pleasant and interactive in the milieu.  Brighter in terms of her affect.  Compliant with medications and meals.  Attending ADLs appropriately.    She reports that her mood is \"pretty good today\".  She is brighter in her affect, smiling, laughing at times during the conversation.  She states \"I want to go back to my group home\".  States that she is looking forward to seeing staff and other peers there.  She reports that she feels as though the medication adjustments \"have done really well\".  She states that she had no difficulty initiating or maintaining sleep last night.  Notes that her energy levels are stable and adequate.  Has not had any outbursts or \"blow outs\" today.  States that her mood has been stable overall.  No adverse side effects to the increased Depakote.  She denies any SI, HI, AVH.    Behavior over the last 24 hours:  unchanged  Sleep: normal  Appetite: normal  Medication side effects: No  ROS: no complaints and all other systems are negative    Mental Status Evaluation:  Appearance:  casually dressed, adequate grooming   Behavior:  cooperative, calm   Speech:  increased volume " "  Mood:  \" good\".   Affect:  brighter   Thought Process:  linear, concrete   Associations: concrete associations   Thought Content:  no overt delusions   Perceptual Disturbances: no auditory hallucinations, no visual hallucinations, does not appear responding to internal stimuli   Risk Potential: Suicidal ideation - None at present  Homicidal ideation - None at present  Potential for aggression - No   Sensorium:  oriented to person, place, and time/date   Memory:  recent and remote memory grossly intact   Consciousness:  alert and awake   Attention/Concentration: decreased concentration and decreased attention span   Insight:  limited   Judgment: limited   Gait/Station: slow gait   Motor Activity: no abnormal movements     Medications: all current active meds have been reviewed and continue current psychiatric medications.  Current Facility-Administered Medications   Medication Dose Route Frequency Provider Last Rate    acetaminophen  650 mg Oral Q6H PRN Baudilio Zimmerman MD      acetaminophen  650 mg Oral Q4H PRN Baudilio Zimmerman MD      acetaminophen  975 mg Oral Q6H PRN Baudilio Zimmerman MD      albuterol  2 puff Inhalation Q4H PRN LENO Hollins      aluminum-magnesium hydroxide-simethicone  30 mL Oral Q4H PRN Baudilio Zimmerman MD      ARIPiprazole  15 mg Oral Daily Baudilio Zimmerman MD      Artificial Tears  1 drop Both Eyes Q3H PRN Baudilio Zimmerman MD      calcium carbonate-vitamin D  1 tablet Oral BID With Meals LENO Hollins      cephalexin  500 mg Oral Q6H [facility] LENO Hollins      cholecalciferol  2,000 Units Oral Daily LENO Hollins      clonazePAM  0.5 mg Oral BID Baudilio Zimmerman MD      cyanocobalamin  1,000 mcg Oral Daily LENO Hollins      hydrOXYzine HCL  50 mg Oral Q6H PRN Max 4/day Baudilio Zimmerman MD      Or    diphenhydrAMINE  50 mg Intramuscular Q6H PRN Baudilio Zimmerman MD      divalproex sodium  1,250 mg Oral HS Baudilio Zimmerman MD      docusate sodium  100 mg Oral Daily " LENO Hollins      fish oil  1,000 mg Oral TID LENO Hollins      furosemide  20 mg Oral Daily Baudilio Zimmerman MD      hydrocortisone  15 mg Oral Daily With Breakfast Baudilio Zimmerman MD      hydrocortisone  5 mg Oral Daily Baudilio Zimmerman MD      hydrOXYzine HCL  100 mg Oral Q6H PRN Max 4/day Baudilio Zimmerman MD      Or    LORazepam  2 mg Intramuscular Q6H PRN Baudilio Zimmerman MD      hydrOXYzine HCL  25 mg Oral Q6H PRN Max 4/day Baudilio Zimmerman MD      levothyroxine  224 mcg Oral Once per day on Monday Tuesday Wednesday Thursday Friday Baudilio Zimmerman MD      levothyroxine  336 mcg Oral Once per day on Sunday Saturday Baudilio Zimmerman MD      magnesium Oxide  400 mg Oral BID LENO Hollins      melatonin  3 mg Oral HS PRN Baudilio Zimmerman MD      multivitamin stress formula  1 tablet Oral Daily LENO Hollins      mupirocin   Topical Daily Radha Brown DPM      OLANZapine  10 mg Oral Q3H PRN Max 3/day Baudilio Zimmerman MD      Or    OLANZapine  10 mg Intramuscular Q3H PRN Max 3/day Baudilio Zimmerman MD      OLANZapine  5 mg Oral Q3H PRN Max 6/day Baudilio Zimmerman MD      Or    OLANZapine  5 mg Intramuscular Q3H PRN Max 6/day Baudilio Zimmerman MD      OLANZapine  2.5 mg Oral Q3H PRN Max 8/day Baudilio Zimmerman MD      phenol  1 spray Mouth/Throat Q2H PRN LENO Hollins      polyethylene glycol  17 g Oral Daily PRN Baudilio Zimmerman MD      saccharomyces boulardii  250 mg Oral BID LENO Hollins      senna-docusate sodium  1 tablet Oral Daily PRN Baudilio Zimmerman MD      sertraline  100 mg Oral Daily Baudilio Zimmerman MD      sertraline  50 mg Oral HS Baudilio Zimmerman MD         Labs: I have personally reviewed all pertinent laboratory/tests results  Most Recent Labs:   Lab Results   Component Value Date    WBC 5.10 02/23/2024    RBC 4.09 02/23/2024    HGB 11.8 02/23/2024    HCT 35.4 02/23/2024     02/23/2024    RDW 12.6 02/23/2024    NEUTROABS 2.35 02/23/2024    SODIUM 141 02/23/2024    K 4.2  02/23/2024     02/23/2024    CO2 30 02/23/2024    BUN 15 02/23/2024    CREATININE 0.56 (L) 02/23/2024    GLUC 87 02/23/2024    CALCIUM 9.1 02/23/2024    AST 10 (L) 02/23/2024    ALT 11 02/23/2024    ALKPHOS 48 02/23/2024    TP 5.3 (L) 02/23/2024    ALB 3.4 (L) 02/23/2024    TBILI 0.32 02/23/2024    CHOLESTEROL 149 02/23/2024    HDL 42 (L) 02/23/2024    TRIG 134 02/23/2024    LDLCALC 80 02/23/2024    NONHDLC 107 02/23/2024    VALPROICTOT 61 02/23/2024    [id number] <0.010 (L) 02/23/2024    FREET4 1.02 02/23/2024    SYPHILISAB Non-reactive 02/23/2024       Progress Toward Goals: progressing    Risks / Benefits of Treatment:    Risks, benefits, and possible side effects of medications explained to patient and patient verbalizes understanding and agreement for treatment.    Counseling / Coordination of Care:    Total floor / unit time spent today 25 minutes. Greater than 50% of total time was spent with the patient and / or family counseling and / or coordination of care. A description of counseling / coordination of care:  Patient's progress discussed with staff in treatment team meeting.  Medications, treatment progress and treatment plan reviewed with patient.  Reassurance and supportive therapy provided.  Encouraged participation in milieu and group therapy on the unit.    Baudilio Zimmerman MD 02/25/24

## 2024-02-25 NOTE — NURSING NOTE
"Yaritza is calm and pleasant with an elated affect and a \"great\" mood. She is excited after learning she may be discharged this week after speaking with the psychiatrist. She is socializing with peers. She is no longer perseverative or somatically preoccupied. She denies depression, anxiety, SI/HI/AVH. Continues to have bilateral LE edema +3, encouraged to keep legs elevated which she has been doing after walking. Gait is unsteady from Right leg deformity. She is not wearing Right leg brace or left wrist splint so far today. Left toe wound care complete, continues to form scab. Pt is attending to ADL's and assisted with linen change. Given albuterol for shortness of breath, intervention effective. No unmet needs at this time.    "

## 2024-02-26 LAB
ALBUMIN SERPL BCP-MCNC: 3.9 G/DL (ref 3.5–5)
ALP SERPL-CCNC: 58 U/L (ref 34–104)
ALT SERPL W P-5'-P-CCNC: 12 U/L (ref 7–52)
ANION GAP SERPL CALCULATED.3IONS-SCNC: 5 MMOL/L
AST SERPL W P-5'-P-CCNC: 12 U/L (ref 13–39)
BASOPHILS # BLD AUTO: 0.02 THOUSANDS/ÂΜL (ref 0–0.1)
BASOPHILS NFR BLD AUTO: 0 % (ref 0–1)
BILIRUB SERPL-MCNC: 0.22 MG/DL (ref 0.2–1)
BUN SERPL-MCNC: 13 MG/DL (ref 5–25)
CALCIUM SERPL-MCNC: 8.9 MG/DL (ref 8.4–10.2)
CHLORIDE SERPL-SCNC: 103 MMOL/L (ref 96–108)
CO2 SERPL-SCNC: 31 MMOL/L (ref 21–32)
CREAT SERPL-MCNC: 0.57 MG/DL (ref 0.6–1.3)
EOSINOPHIL # BLD AUTO: 0.11 THOUSAND/ÂΜL (ref 0–0.61)
EOSINOPHIL NFR BLD AUTO: 2 % (ref 0–6)
ERYTHROCYTE [DISTWIDTH] IN BLOOD BY AUTOMATED COUNT: 12.5 % (ref 11.6–15.1)
GFR SERPL CREATININE-BSD FRML MDRD: 113 ML/MIN/1.73SQ M
GLUCOSE SERPL-MCNC: 91 MG/DL (ref 65–140)
HCT VFR BLD AUTO: 38.4 % (ref 34.8–46.1)
HGB BLD-MCNC: 12.7 G/DL (ref 11.5–15.4)
IMM GRANULOCYTES # BLD AUTO: 0.03 THOUSAND/UL (ref 0–0.2)
IMM GRANULOCYTES NFR BLD AUTO: 1 % (ref 0–2)
LYMPHOCYTES # BLD AUTO: 3.09 THOUSANDS/ÂΜL (ref 0.6–4.47)
LYMPHOCYTES NFR BLD AUTO: 48 % (ref 14–44)
MCH RBC QN AUTO: 28.9 PG (ref 26.8–34.3)
MCHC RBC AUTO-ENTMCNC: 33.1 G/DL (ref 31.4–37.4)
MCV RBC AUTO: 88 FL (ref 82–98)
MONOCYTES # BLD AUTO: 0.36 THOUSAND/ÂΜL (ref 0.17–1.22)
MONOCYTES NFR BLD AUTO: 6 % (ref 4–12)
NEUTROPHILS # BLD AUTO: 2.76 THOUSANDS/ÂΜL (ref 1.85–7.62)
NEUTS SEG NFR BLD AUTO: 43 % (ref 43–75)
NRBC BLD AUTO-RTO: 0 /100 WBCS
PLATELET # BLD AUTO: 226 THOUSANDS/UL (ref 149–390)
PMV BLD AUTO: 10.7 FL (ref 8.9–12.7)
POTASSIUM SERPL-SCNC: 4 MMOL/L (ref 3.5–5.3)
PROT SERPL-MCNC: 6.2 G/DL (ref 6.4–8.4)
RBC # BLD AUTO: 4.39 MILLION/UL (ref 3.81–5.12)
SODIUM SERPL-SCNC: 139 MMOL/L (ref 135–147)
VALPROATE SERPL-MCNC: 61 UG/ML (ref 50–100)
WBC # BLD AUTO: 6.37 THOUSAND/UL (ref 4.31–10.16)

## 2024-02-26 PROCEDURE — 85025 COMPLETE CBC W/AUTO DIFF WBC: CPT | Performed by: STUDENT IN AN ORGANIZED HEALTH CARE EDUCATION/TRAINING PROGRAM

## 2024-02-26 PROCEDURE — 80053 COMPREHEN METABOLIC PANEL: CPT | Performed by: STUDENT IN AN ORGANIZED HEALTH CARE EDUCATION/TRAINING PROGRAM

## 2024-02-26 PROCEDURE — 99232 SBSQ HOSP IP/OBS MODERATE 35: CPT | Performed by: STUDENT IN AN ORGANIZED HEALTH CARE EDUCATION/TRAINING PROGRAM

## 2024-02-26 PROCEDURE — 80164 ASSAY DIPROPYLACETIC ACD TOT: CPT | Performed by: STUDENT IN AN ORGANIZED HEALTH CARE EDUCATION/TRAINING PROGRAM

## 2024-02-26 RX ADMIN — CLONAZEPAM 0.5 MG: 0.5 TABLET ORAL at 08:09

## 2024-02-26 RX ADMIN — CEPHALEXIN 500 MG: 500 CAPSULE ORAL at 23:13

## 2024-02-26 RX ADMIN — CEPHALEXIN 500 MG: 500 CAPSULE ORAL at 17:33

## 2024-02-26 RX ADMIN — CLONAZEPAM 0.5 MG: 0.5 TABLET ORAL at 17:33

## 2024-02-26 RX ADMIN — DIVALPROEX SODIUM 1250 MG: 500 TABLET, EXTENDED RELEASE ORAL at 21:29

## 2024-02-26 RX ADMIN — SERTRALINE HYDROCHLORIDE 100 MG: 100 TABLET ORAL at 08:08

## 2024-02-26 RX ADMIN — HYDROCORTISONE 5 MG: 10 TABLET ORAL at 14:51

## 2024-02-26 RX ADMIN — FUROSEMIDE 20 MG: 20 TABLET ORAL at 08:09

## 2024-02-26 RX ADMIN — OMEGA-3 FATTY ACIDS CAP 1000 MG 1000 MG: 1000 CAP at 08:08

## 2024-02-26 RX ADMIN — OMEGA-3 FATTY ACIDS CAP 1000 MG 1000 MG: 1000 CAP at 17:33

## 2024-02-26 RX ADMIN — FLUTICASONE PROPIONATE 1 SPRAY: 50 SPRAY, METERED NASAL at 08:09

## 2024-02-26 RX ADMIN — Medication 400 MG: at 08:09

## 2024-02-26 RX ADMIN — Medication 400 MG: at 17:33

## 2024-02-26 RX ADMIN — MUPIROCIN: 20 OINTMENT TOPICAL at 08:09

## 2024-02-26 RX ADMIN — CHOLECALCIFEROL TAB 25 MCG (1000 UNIT) 2000 UNITS: 25 TAB at 08:08

## 2024-02-26 RX ADMIN — Medication 250 MG: at 08:08

## 2024-02-26 RX ADMIN — CEPHALEXIN 500 MG: 500 CAPSULE ORAL at 05:09

## 2024-02-26 RX ADMIN — Medication 1 SPRAY: at 23:13

## 2024-02-26 RX ADMIN — Medication 250 MG: at 17:33

## 2024-02-26 RX ADMIN — CYANOCOBALAMIN TAB 1000 MCG 1000 MCG: 1000 TAB at 08:09

## 2024-02-26 RX ADMIN — HYDROCORTISONE 15 MG: 10 TABLET ORAL at 08:09

## 2024-02-26 RX ADMIN — CEPHALEXIN 500 MG: 500 CAPSULE ORAL at 11:40

## 2024-02-26 RX ADMIN — OMEGA-3 FATTY ACIDS CAP 1000 MG 1000 MG: 1000 CAP at 21:29

## 2024-02-26 RX ADMIN — DOCUSATE SODIUM 100 MG: 100 CAPSULE, LIQUID FILLED ORAL at 08:09

## 2024-02-26 RX ADMIN — B-COMPLEX W/ C & FOLIC ACID TAB 1 TABLET: TAB at 08:09

## 2024-02-26 RX ADMIN — SERTRALINE HYDROCHLORIDE 50 MG: 50 TABLET ORAL at 21:29

## 2024-02-26 RX ADMIN — PSEUDOEPHEDRINE HCL 30 MG: 30 TABLET, FILM COATED ORAL at 11:12

## 2024-02-26 RX ADMIN — Medication 1 TABLET: at 17:33

## 2024-02-26 RX ADMIN — Medication 1 SPRAY: at 02:26

## 2024-02-26 RX ADMIN — ARIPIPRAZOLE 15 MG: 15 TABLET ORAL at 08:09

## 2024-02-26 RX ADMIN — LEVOTHYROXINE SODIUM 224 MCG: 112 TABLET ORAL at 05:09

## 2024-02-26 RX ADMIN — Medication 1 TABLET: at 08:09

## 2024-02-26 NOTE — NURSING NOTE
Pt observed in dayroom for meals, and resting in bed intermittently. Pt is wearing her own clothing. Medication and meal compliant. Pt denies SI/HI/AH/VH, or pain. Pt is preoccupied with receiving more donation clothing and with discharge. Pt is verbally redirectable.

## 2024-02-26 NOTE — NURSING NOTE
Guerline from pt's group home called for status/updates. GRAHAM in chart for group home. As per Guerline, this pt was on Seroquel 100mg a couple of months ago but it had been tampered down to 50mg and then discontinued due to increased weight gain. When this medication was discontinued around November of 2023, is when her moods and mental health began to decline. She also mentioned that the pt was started on Abilify but did not seem effective for her. Guerline left her contact information for when discharge is determined. She can be reached at [phone].

## 2024-02-26 NOTE — PROGRESS NOTES
"Progress Note - Behavioral Health   Yaritza Dunne 43 y.o. female MRN: [id number]  Unit/Bed#: [facility] 374-02 Encounter: [id number]    Assessment/Plan   Principal Problem:    Unspecified mood (affective) disorder (HCC)  Active Problems:    Intermittent explosive disorder    Intellectual disability    Medical clearance for psychiatric admission    Epilepsy (HCC)    Adrenocortical insufficiency (HCC)    Hypothyroidism    Obesity    Dyslipidemia    Edema    Open toe wound    T wave inversion in EKG    Recommended Treatment:   Depakote 1250 mg at bedtime for mood stabilization.  CBC/CMP/VPA level pending tomorrow.  Abilify 15 mg daily for psychosis and mood.  Klonopin 0.5 mg twice daily for anxiety.  Zoloft 150 mg daily for anxiety and depression.    All current active medications have been reviewed  Encourage group therapy, milieu therapy and occupational therapy  Behavioral Health checks every 7 minutes  Medical management per SLIM.    Commitment Status: 303 granted  ----------------------------------------      Subjective: Patient discussed in nursing report and overnight notes and charting reviewed. Per nursing report, patient has been in good behavioral control.  Yesterday she did get upset about having a flat soda.  She was noted at times to be somatically preoccupied although is able to be redirected.  Compliant with her medications and meals.    She states that yesterday she was a little bit \"sad\".  She states that she felt as though staff were being \"mean to me\" and decided to go to her room and hugged her stuffed animal Snoopy which helped her cope.  Otherwise she states that she has been doing fairly well in terms of her mood today.  Reports that she has not had any blowouts or explosive episodes.  No issues with anger or aggression.  She is sleeping better, feeling more rested at night.  Appetite levels have improved as well.  She denies any SI, HI, AVH today.  She does perseverate about discharge, hoping that she " "may be able to be discharged today stating that she is spoken over the weekend to members from her group home.  She provided testimony in [address] hearing today.    Behavior over the last 24 hours:  unchanged  Sleep: normal  Appetite: normal  Medication side effects: No  ROS: no complaints and all other systems are negative    Mental Status Evaluation:  Appearance:  casually dressed, marginal hygiene   Behavior:  cooperative, calm   Speech:  loud   Mood:  \"Sad\"   Affect:  reactive   Thought Process:  decreased rate of thoughts   Associations: concrete associations   Thought Content:  no overt delusions, ruminations   Perceptual Disturbances: no auditory hallucinations, no visual hallucinations, does not appear responding to internal stimuli   Risk Potential: Suicidal ideation - None  Homicidal ideation - None  Potential for aggression - No   Sensorium:  oriented to person, place, and time/date   Memory:  recent and remote memory grossly intact   Consciousness:  alert and awake   Attention/Concentration: attention span and concentration appear shorter than expected for age   Insight:  limited   Judgment: limited   Gait/Station: normal gait/station   Motor Activity: no abnormal movements     Medications: all current active meds have been reviewed.  Current Facility-Administered Medications   Medication Dose Route Frequency Provider Last Rate    acetaminophen  650 mg Oral Q6H PRN Baudilio Zimmerman MD      acetaminophen  650 mg Oral Q4H PRN Baudilio Zimmerman MD      acetaminophen  975 mg Oral Q6H PRN Baudilio Zimmerman MD      albuterol  2 puff Inhalation Q4H PRN LENO Hollins      aluminum-magnesium hydroxide-simethicone  30 mL Oral Q4H PRN Baudilio Zimmerman MD      ARIPiprazole  15 mg Oral Daily Baudilio Zimmerman MD      Artificial Tears  1 drop Both Eyes Q3H PRN Baudilio Zimmerman MD      calcium carbonate-vitamin D  1 tablet Oral BID With Meals LENO Hollins      cephalexin  500 mg Oral Q6H [facility] LENO Hollins "      cholecalciferol  2,000 Units Oral Daily LENO Hollins      clonazePAM  0.5 mg Oral BID Baudilio Zimmerman MD      cyanocobalamin  1,000 mcg Oral Daily LENO Hollins      hydrOXYzine HCL  50 mg Oral Q6H PRN Max 4/day Baudilio Zimmerman MD      Or    diphenhydrAMINE  50 mg Intramuscular Q6H PRN Baudilio Zimmerman MD      divalproex sodium  1,250 mg Oral HS Baudilio Zimmerman MD      docusate sodium  100 mg Oral Daily LENO Hollins      fish oil  1,000 mg Oral TID LENO Hollins      fluticasone  1 spray Each Nare Daily Christal Zayas MD      furosemide  20 mg Oral Daily Baudilio Zimmerman MD      hydrocortisone  15 mg Oral Daily With Breakfast Baudilio Zimmerman MD      hydrocortisone  5 mg Oral Daily Baudilio Zimmerman MD      hydrOXYzine HCL  100 mg Oral Q6H PRN Max 4/day Baudilio Zimmerman MD      Or    LORazepam  2 mg Intramuscular Q6H PRN Baudilio Zimmerman MD      hydrOXYzine HCL  25 mg Oral Q6H PRN Max 4/day Baudilio Zimmerman MD      levothyroxine  224 mcg Oral Once per day on Monday Tuesday Wednesday Thursday Friday Baudilio Zimmerman MD      levothyroxine  336 mcg Oral Once per day on Sunday Saturday Baudilio Zimmerman MD      magnesium Oxide  400 mg Oral BID LENO Hollins      melatonin  3 mg Oral HS PRN Baudilio Zimmerman MD      multivitamin stress formula  1 tablet Oral Daily LENO Hollins      mupirocin   Topical Daily Radha Brown DPM      OLANZapine  10 mg Oral Q3H PRN Max 3/day Baudilio Zimmerman MD      Or    OLANZapine  10 mg Intramuscular Q3H PRN Max 3/day Baudilio Zimmerman MD      OLANZapine  5 mg Oral Q3H PRN Max 6/day Baudilio Zimmerman MD      Or    OLANZapine  5 mg Intramuscular Q3H PRN Max 6/day Baudilio Zimmerman MD      OLANZapine  2.5 mg Oral Q3H PRN Max 8/day Baudilio Zimmerman MD      phenol  1 spray Mouth/Throat Q2H PRN LENO Hollins      polyethylene glycol  17 g Oral Daily PRN Baudilio Zimmerman MD      saccharomyces boulardii  250 mg Oral BID LENO Hollins       senna-docusate sodium  1 tablet Oral Daily PRN Baudilio Zimmerman MD      [START ON 2/27/2024] sertraline  150 mg Oral Daily Baudilio Zimmerman MD      sertraline  50 mg Oral HS Baudilio Zimmerman MD         Labs: I have personally reviewed all pertinent laboratory/tests results  Most Recent Labs:     Most Recent Labs:   Lab Results   Component Value Date    WBC 6.37 02/26/2024    RBC 4.39 02/26/2024    HGB 12.7 02/26/2024    HCT 38.4 02/26/2024     02/26/2024    RDW 12.5 02/26/2024    NEUTROABS 2.76 02/26/2024    SODIUM 139 02/26/2024    K 4.0 02/26/2024     02/26/2024    CO2 31 02/26/2024    BUN 13 02/26/2024    CREATININE 0.57 (L) 02/26/2024    GLUC 91 02/26/2024    CALCIUM 8.9 02/26/2024    AST 12 (L) 02/26/2024    ALT 12 02/26/2024    ALKPHOS 58 02/26/2024    TP 6.2 (L) 02/26/2024    ALB 3.9 02/26/2024    TBILI 0.22 02/26/2024    CHOLESTEROL 149 02/23/2024    HDL 42 (L) 02/23/2024    TRIG 134 02/23/2024    LDLCALC 80 02/23/2024    NONHDLC 107 02/23/2024    VALPROICTOT 61 02/23/2024    [id number] <0.010 (L) 02/23/2024    FREET4 1.02 02/23/2024    SYPHILISAB Non-reactive 02/23/2024       Progress Toward Goals: progressing    Risks / Benefits of Treatment:    Risks, benefits, and possible side effects of medications explained to patient and patient verbalizes understanding and agreement for treatment.    Counseling / Coordination of Care:    Total floor / unit time spent today 45 minutes. Greater than 50% of total time was spent with the patient and / or family counseling and / or coordination of care.     A description of counseling / coordination of care:  Patient's progress discussed with staff in treatment team meeting.  Treatment plan, treatment progress and medication changes were reviewed with nursing staff, pharmacy service, and case management in interdisciplinary treatment team meeting.  Medications, treatment progress and treatment plan reviewed with patient.  Recent stressors including family issues, medical  problems, recent medication change, and everyday stressors discussed with patient.  Educated on importance of medication and treatment compliance.  Reassurance and supportive therapy provided.  Encouraged participation in milieu and group therapy on the unit.  Time spent in providing Zoom testimony for [facility] for 303 hearing.    Baudilio Zimmerman MD 02/26/24

## 2024-02-26 NOTE — NURSING NOTE
Patient requested and received PRN chloraseptic spray for a sore throat at 0226. At 0326 patient reports that the spray helped their throat.

## 2024-02-26 NOTE — PROGRESS NOTES
02/26/24 [id number]   Team Meeting   Meeting Type Daily Rounds   Team Members Present   Team Members Present Physician;Nurse;;Other (Discipline and Name)   Physician Team Member MD Erasmo   Nursing Team Member Placido   Care Management Team Member Syed   Other (Discipline and Name) Tamara, Pharm MICHAEL Foy (therapist)   Patient/Family Present   Patient Present No   Patient's Family Present No   OTHER   Team Meeting - Additional Comments Poor sleep Sunday night.  Recieving abx for her toe. Compliant with meds and meals, attended all therapy groups this weekend. Reports increased anxiety regarding 303 hearing and discharge date. Denies SI/HI/AH/VH.  Plan to consolidate Zoloft dose to 150mg am dosing.  D/C TBD

## 2024-02-27 LAB — VALPROATE SERPL-MCNC: 60 UG/ML (ref 50–100)

## 2024-02-27 PROCEDURE — 80164 ASSAY DIPROPYLACETIC ACD TOT: CPT | Performed by: STUDENT IN AN ORGANIZED HEALTH CARE EDUCATION/TRAINING PROGRAM

## 2024-02-27 PROCEDURE — 99232 SBSQ HOSP IP/OBS MODERATE 35: CPT | Performed by: STUDENT IN AN ORGANIZED HEALTH CARE EDUCATION/TRAINING PROGRAM

## 2024-02-27 RX ADMIN — OMEGA-3 FATTY ACIDS CAP 1000 MG 1000 MG: 1000 CAP at 21:01

## 2024-02-27 RX ADMIN — CLONAZEPAM 0.5 MG: 0.5 TABLET ORAL at 17:49

## 2024-02-27 RX ADMIN — B-COMPLEX W/ C & FOLIC ACID TAB 1 TABLET: TAB at 08:03

## 2024-02-27 RX ADMIN — CHOLECALCIFEROL TAB 25 MCG (1000 UNIT) 2000 UNITS: 25 TAB at 08:03

## 2024-02-27 RX ADMIN — MUPIROCIN 1 APPLICATION: 20 OINTMENT TOPICAL at 08:02

## 2024-02-27 RX ADMIN — LEVOTHYROXINE SODIUM 224 MCG: 112 TABLET ORAL at 05:25

## 2024-02-27 RX ADMIN — CEPHALEXIN 500 MG: 500 CAPSULE ORAL at 17:49

## 2024-02-27 RX ADMIN — DIVALPROEX SODIUM 1250 MG: 500 TABLET, EXTENDED RELEASE ORAL at 21:01

## 2024-02-27 RX ADMIN — FLUTICASONE PROPIONATE 1 SPRAY: 50 SPRAY, METERED NASAL at 08:02

## 2024-02-27 RX ADMIN — ARIPIPRAZOLE 15 MG: 15 TABLET ORAL at 08:03

## 2024-02-27 RX ADMIN — Medication 250 MG: at 08:03

## 2024-02-27 RX ADMIN — CYANOCOBALAMIN TAB 1000 MCG 1000 MCG: 1000 TAB at 08:03

## 2024-02-27 RX ADMIN — Medication 250 MG: at 17:49

## 2024-02-27 RX ADMIN — Medication 400 MG: at 08:03

## 2024-02-27 RX ADMIN — OMEGA-3 FATTY ACIDS CAP 1000 MG 1000 MG: 1000 CAP at 08:03

## 2024-02-27 RX ADMIN — Medication 400 MG: at 17:49

## 2024-02-27 RX ADMIN — CLONAZEPAM 0.5 MG: 0.5 TABLET ORAL at 08:02

## 2024-02-27 RX ADMIN — SERTRALINE HYDROCHLORIDE 150 MG: 100 TABLET ORAL at 08:03

## 2024-02-27 RX ADMIN — FUROSEMIDE 20 MG: 20 TABLET ORAL at 08:02

## 2024-02-27 RX ADMIN — HYDROCORTISONE 15 MG: 10 TABLET ORAL at 08:02

## 2024-02-27 RX ADMIN — CEPHALEXIN 500 MG: 500 CAPSULE ORAL at 05:25

## 2024-02-27 RX ADMIN — CEPHALEXIN 500 MG: 500 CAPSULE ORAL at 12:55

## 2024-02-27 RX ADMIN — Medication 1 TABLET: at 08:03

## 2024-02-27 RX ADMIN — HYDROCORTISONE 5 MG: 10 TABLET ORAL at 16:12

## 2024-02-27 RX ADMIN — OMEGA-3 FATTY ACIDS CAP 1000 MG 1000 MG: 1000 CAP at 16:12

## 2024-02-27 RX ADMIN — DOCUSATE SODIUM 100 MG: 100 CAPSULE, LIQUID FILLED ORAL at 08:02

## 2024-02-27 RX ADMIN — Medication 1 TABLET: at 16:12

## 2024-02-27 NOTE — PROGRESS NOTES
Progress Note - Behavioral Health   Yaritza Dunne 43 y.o. female MRN: [id number]  Unit/Bed#: [facility] 374-02 Encounter: [id number]    Assessment/Plan   Principal Problem:    Unspecified mood (affective) disorder (HCC)  Active Problems:    Intellectual disability    Intermittent explosive disorder    Medical clearance for psychiatric admission    Epilepsy (HCC)    Adrenocortical insufficiency (HCC)    Hypothyroidism    Obesity    Dyslipidemia    Edema    Open toe wound    T wave inversion in EKG    Recommended Treatment:   - Continue Depakote 1250mg at bedtime for mood stabilization  - Repeat Depakote level, as repeat remains unchanged from initial level   - Abilify 15mg daily for psychosis and mood   - Klonopin 0.5mg BID for anxiety   - Zoloft 150mg daily for anxiety and dpression    All current active medications have been reviewed  Encourage group therapy, milieu therapy and occupational therapy  Behavioral Health checks every 7 minutes  Discharge planning  Medical management per SLIM.    Commitment Status: 303 granted   ----------------------------------------      Subjective: Patient discussed in nursing report and overnight notes and charting reviewed. Per nursing report, the patient denies SI/HI/hallucinations. Patient preoccupied with discharge but is verbally redirectable and apologetic. Compliant with meds and meals.     This morning, the patient reports feeling 'very excited about going home'. Patient perseverating on discharge day, reporting she is too excited to participate in group this morning. She reports sleeping well overnight, and has good appetite. When reminded that she came to the hospital for 'an increase in her medication' and asked if the increase has helped with her mood, she reports that it 'really helped. The patient excitedly talks about group art, and looks through her folder of paperwork to gift one to physician. She then offers coffee or yogurt or an orange for breakfast. Patient is verbally  redirectable. She adamantly denies depressed mood, SI, HI, hallucinations. She repeats that she is 'just excited to go home', and asks who will pick her up. When asked about how she will cope if she feels a 'blowout' coming on again, the patients reports that she will 'try to go back to her room to calm down or come back to [facility]'.     When asked how an apple and orange are related, the patients responds that 'they are both fruit. When asked what the phrase 'people who live in glass houses shouldn't throw stones' means, she     She adds that she feels congested this morning, and requests Flonase. She reports a history of allergies and has used Flonase in the past. Denies sore throat, headache, chills.     Behavior over the last 24 hours:  unchanged  Sleep: normal  Appetite: normal  Medication side effects: No  ROS:  Congestion and all other systems are negative    Mental Status Evaluation:  Appearance:  casually dressed, marginal hygiene   Behavior:  cooperative, calm   Speech:  loud   Mood:  improved, excited   Affect:  reactive   Thought Process:  more perseverative   Associations: concrete associations   Thought Content:  no overt delusions   Perceptual Disturbances: none   Risk Potential: Suicidal ideation - None  Homicidal ideation - None  Potential for aggression - No   Sensorium:  oriented to person, place, and time/date   Memory:  recent and remote memory grossly intact   Consciousness:  alert and awake   Attention/Concentration: attention span and concentration appear shorter than expected for age   Insight:  limited   Judgment: limited   Gait/Station: R. Valgus deformity    Motor Activity: no abnormal movements     Medications: all current active meds have been reviewed.  Current Facility-Administered Medications   Medication Dose Route Frequency Provider Last Rate    acetaminophen  650 mg Oral Q6H PRN Baudilio Zimmerman MD      acetaminophen  650 mg Oral Q4H PRN Baudilio Zimmerman MD      acetaminophen  975 mg  Oral Q6H PRN Baudilio Zimmerman MD      albuterol  2 puff Inhalation Q4H PRN LENO Hollins      aluminum-magnesium hydroxide-simethicone  30 mL Oral Q4H PRN Baudilio Zimmerman MD      ARIPiprazole  15 mg Oral Daily Baudilio Zimmerman MD      Artificial Tears  1 drop Both Eyes Q3H PRN Baudilio Zimmerman MD      calcium carbonate-vitamin D  1 tablet Oral BID With Meals LENO Hollins      cephalexin  500 mg Oral Q6H STEPHENIE LENO Hollins      cholecalciferol  2,000 Units Oral Daily LENO Hollins      clonazePAM  0.5 mg Oral BID Baudilio Zimmerman MD      cyanocobalamin  1,000 mcg Oral Daily LENO Hollins      hydrOXYzine HCL  50 mg Oral Q6H PRN Max 4/day Baudilio Zimmerman MD      Or    diphenhydrAMINE  50 mg Intramuscular Q6H PRN Baudilio Zimmerman MD      divalproex sodium  1,250 mg Oral HS Baudilio Zimmerman MD      docusate sodium  100 mg Oral Daily LENO Hollins      fish oil  1,000 mg Oral TID LENO Hollins      fluticasone  1 spray Each Nare Daily Christal Zayas MD      furosemide  20 mg Oral Daily Baudilio Zimmerman MD      hydrocortisone  15 mg Oral Daily With Breakfast Baudilio Zimmerman MD      hydrocortisone  5 mg Oral Daily Baudilio Zimmerman MD      hydrOXYzine HCL  100 mg Oral Q6H PRN Max 4/day Baudilio Zimmerman MD      Or    LORazepam  2 mg Intramuscular Q6H PRN Baudilio Zimmerman MD      hydrOXYzine HCL  25 mg Oral Q6H PRN Max 4/day Baudilio Zimmerman MD      levothyroxine  224 mcg Oral Once per day on Monday Tuesday Wednesday Thursday Friday Baudilio Zimmerman MD      levothyroxine  336 mcg Oral Once per day on Sunday Saturday Baudilio Zimmerman MD      magnesium Oxide  400 mg Oral BID LENO Hollins      melatonin  3 mg Oral HS PRN Baudilio Zimmerman MD      multivitamin stress formula  1 tablet Oral Daily LENO Hollins      mupirocin   Topical Daily Radha Brown DPM      OLANZapine  10 mg Oral Q3H PRN Max 3/day Baudilio Zimmerman MD      Or    OLANZapine  10 mg  Intramuscular Q3H PRN Max 3/day Baudilio Zimmerman MD      OLANZapine  5 mg Oral Q3H PRN Max 6/day Baudilio Zimmerman MD      Or    OLANZapine  5 mg Intramuscular Q3H PRN Max 6/day Baudilio Zimmerman MD      OLANZapine  2.5 mg Oral Q3H PRN Max 8/day Baudilio Zimmerman MD      phenol  1 spray Mouth/Throat Q2H PRN LENO Hollins      polyethylene glycol  17 g Oral Daily PRN Baudilio Zimmerman MD      saccharomyces boulardii  250 mg Oral BID LENO Hollins      senna-docusate sodium  1 tablet Oral Daily PRN Baudilio Zimmerman MD      sertraline  150 mg Oral Daily Baudilio Zimmerman MD         Labs: I have personally reviewed all pertinent laboratory/tests results  Most Recent Labs:     Most Recent Labs:   Lab Results   Component Value Date    WBC 6.37 02/26/2024    RBC 4.39 02/26/2024    HGB 12.7 02/26/2024    HCT 38.4 02/26/2024     02/26/2024    RDW 12.5 02/26/2024    NEUTROABS 2.76 02/26/2024    SODIUM 139 02/26/2024    K 4.0 02/26/2024     02/26/2024    CO2 31 02/26/2024    BUN 13 02/26/2024    CREATININE 0.57 (L) 02/26/2024    GLUC 91 02/26/2024    CALCIUM 8.9 02/26/2024    AST 12 (L) 02/26/2024    ALT 12 02/26/2024    ALKPHOS 58 02/26/2024    TP 6.2 (L) 02/26/2024    ALB 3.9 02/26/2024    TBILI 0.22 02/26/2024    CHOLESTEROL 149 02/23/2024    HDL 42 (L) 02/23/2024    TRIG 134 02/23/2024    LDLCALC 80 02/23/2024    NONHDLC 107 02/23/2024    VALPROICTOT 61 02/26/2024    [id number] <0.010 (L) 02/23/2024    FREET4 1.02 02/23/2024    SYPHILISAB Non-reactive 02/23/2024     Depakote:   Lab Results   Component Value Date    VALPROICTOT 61 02/26/2024     Drug Screen:   Lab Results   Component Value Date    AMPMETHUR Negative 02/22/2024    BARBTUR Negative 02/22/2024    BDZUR Negative 02/22/2024    THCUR Negative 02/22/2024    COCAINEUR Negative 02/22/2024    METHADONEUR Negative 02/22/2024    OPIATEUR Negative 02/22/2024    PCPUR Negative 02/22/2024       Progress Toward Goals: continues to improve, attends groups, mood is  stabilizing, discharge planning    Risks / Benefits of Treatment:    Risks, benefits, and possible side effects of medications explained to patient and patient verbalizes understanding and agreement for treatment.    Counseling / Coordination of Care:    Total floor / unit time spent today 30 minutes. Greater than 50% of total time was spent with the patient and / or family counseling and / or coordination of care.     A description of counseling / coordination of care:  Patient's progress discussed with staff in treatment team meeting.  Treatment plan, treatment progress and medication changes were reviewed with nursing staff, pharmacy service, and case management in interdisciplinary treatment team meeting.  Medications, treatment progress and treatment plan reviewed with patient.  Recent stressors including  recent angry outbursts at group home  discussed with patient.  Educated on importance of medication and treatment compliance.  Reassurance and supportive therapy provided.  Encouraged participation in milieu and group therapy on the unit.    Sissy Sewell 02/27/24  MS-3 [facility]

## 2024-02-27 NOTE — DISCHARGE INSTR - OTHER ORDERS
If you are in need of county resources:     Phone: [phone]  phone [place]: [address], [address], [place] [address]  phone [place]: [address] [facility], [place] [address]  phone [place]: [address] [facility], [address], [place] [address]    If you are experiencing a crisis:     If you or someone you know is struggling or in crisis, help is available. Call or text [email or web address] or chat [email or web address]. You can also reach Crisis Text Line by texting [facility] to 917391.      Grace, or Hilda, our Behavioral Health Nurse Navigators, will be calling you after your discharge, on the phone number that you provided.  They will be available as an additional support, if needed.   If you wish to speak with one of them, you may contact Grace at [phone] or Hilda at [phone].

## 2024-02-27 NOTE — NURSING NOTE
Pt given scheduled keflex. Requested and given PRN chloraseptic for sore throat at 2313; pt offers no further complaints.

## 2024-02-27 NOTE — NURSING NOTE
Pt observed in milieu and dayroom. Pt is social with peers and cooperative with staff. Pt is wearing their own clothing. Medication and meal compliant. Pt denies SI/HI/AH/VH, and pain. Pt's wound on right foot is dry and decreasing in size. Pt's brace is on her right knee/RLE, skin integrity intact.  Pt requires occasional verbal redirection, which is effective. No unmet needs.

## 2024-02-27 NOTE — NURSING NOTE
Pt largely withdrawn to room this evening. Pt is bright and social, has poor focus. Pt is often intrusive during conversation, but redirectable and apologetic. Pt medication adherent, requesting various medications that are either discontinued or scheduled for the morning. Pt educated, pt then asked for a given cough drop. Pt denies SI/HI/AH/VH

## 2024-02-27 NOTE — CASE MANAGEMENT
SW called patients group home to discuss discharge for Wednesday. Group home staff explained they would have coordinate transportation with their team. SHANEKA expressed pt needed behavioral specialist services. Guerline explained the group home does offer those services. SHANEKA disclosed she would await a response to coordinate transportation.

## 2024-02-28 VITALS
SYSTOLIC BLOOD PRESSURE: 136 MMHG | DIASTOLIC BLOOD PRESSURE: 71 MMHG | BODY MASS INDEX: 47.34 KG/M2 | TEMPERATURE: 97.6 F | HEART RATE: 79 BPM | HEIGHT: 65 IN | OXYGEN SATURATION: 97 % | WEIGHT: 284.1 LBS | RESPIRATION RATE: 18 BRPM

## 2024-02-28 PROBLEM — Z00.8 MEDICAL CLEARANCE FOR PSYCHIATRIC ADMISSION: Status: RESOLVED | Noted: 2024-02-23 | Resolved: 2024-02-28

## 2024-02-28 LAB
T4 FREE SERPL-MCNC: 1.32 NG/DL (ref 0.61–1.12)
TSH SERPL DL<=0.05 MIU/L-ACNC: <0.01 UIU/ML (ref 0.45–4.5)

## 2024-02-28 PROCEDURE — 84443 ASSAY THYROID STIM HORMONE: CPT | Performed by: NURSE PRACTITIONER

## 2024-02-28 PROCEDURE — 84439 ASSAY OF FREE THYROXINE: CPT | Performed by: NURSE PRACTITIONER

## 2024-02-28 PROCEDURE — 99238 HOSP IP/OBS DSCHRG MGMT 30/<: CPT | Performed by: STUDENT IN AN ORGANIZED HEALTH CARE EDUCATION/TRAINING PROGRAM

## 2024-02-28 RX ORDER — LANOLIN ALCOHOL/MO/W.PET/CERES
400 CREAM (GRAM) TOPICAL 2 TIMES DAILY
Qty: 60 TABLET | Refills: 0 | Status: SHIPPED | OUTPATIENT
Start: 2024-02-28 | End: 2024-03-29

## 2024-02-28 RX ORDER — HYDROCORTISONE 5 MG/1
5 TABLET ORAL DAILY
Qty: 30 TABLET | Refills: 0 | Status: SHIPPED | OUTPATIENT
Start: 2024-02-28 | End: 2024-03-29

## 2024-02-28 RX ORDER — ARIPIPRAZOLE 15 MG/1
15 TABLET ORAL DAILY
Qty: 30 TABLET | Refills: 0 | Status: SHIPPED | OUTPATIENT
Start: 2024-02-28 | End: 2024-03-29

## 2024-02-28 RX ORDER — CEPHALEXIN 500 MG/1
500 CAPSULE ORAL EVERY 6 HOURS SCHEDULED
Qty: 13 CAPSULE | Refills: 0 | Status: SHIPPED | OUTPATIENT
Start: 2024-02-28 | End: 2024-03-03

## 2024-02-28 RX ORDER — CLONAZEPAM 0.5 MG/1
0.5 TABLET ORAL 2 TIMES DAILY
Qty: 60 TABLET | Refills: 0 | Status: SHIPPED | OUTPATIENT
Start: 2024-02-28 | End: 2024-03-29

## 2024-02-28 RX ORDER — LANOLIN ALCOHOL/MO/W.PET/CERES
1 CREAM (GRAM) TOPICAL 2 TIMES DAILY WITH MEALS
Qty: 60 TABLET | Refills: 0 | Status: SHIPPED | OUTPATIENT
Start: 2024-02-28 | End: 2024-03-29

## 2024-02-28 RX ORDER — DIVALPROEX SODIUM 250 MG/1
1250 TABLET, EXTENDED RELEASE ORAL
Qty: 150 TABLET | Refills: 0 | Status: SHIPPED | OUTPATIENT
Start: 2024-02-28 | End: 2024-03-29

## 2024-02-28 RX ORDER — SACCHAROMYCES BOULARDII 250 MG
250 CAPSULE ORAL 2 TIMES DAILY
Qty: 60 CAPSULE | Refills: 0 | Status: SHIPPED | OUTPATIENT
Start: 2024-02-28 | End: 2024-03-29

## 2024-02-28 RX ADMIN — OMEGA-3 FATTY ACIDS CAP 1000 MG 1000 MG: 1000 CAP at 08:04

## 2024-02-28 RX ADMIN — Medication 250 MG: at 08:02

## 2024-02-28 RX ADMIN — DOCUSATE SODIUM 100 MG: 100 CAPSULE, LIQUID FILLED ORAL at 08:03

## 2024-02-28 RX ADMIN — CLONAZEPAM 0.5 MG: 0.5 TABLET ORAL at 08:06

## 2024-02-28 RX ADMIN — SERTRALINE HYDROCHLORIDE 150 MG: 100 TABLET ORAL at 08:04

## 2024-02-28 RX ADMIN — LEVOTHYROXINE SODIUM 224 MCG: 112 TABLET ORAL at 06:14

## 2024-02-28 RX ADMIN — HYDROCORTISONE 15 MG: 10 TABLET ORAL at 08:04

## 2024-02-28 RX ADMIN — CHOLECALCIFEROL TAB 25 MCG (1000 UNIT) 2000 UNITS: 25 TAB at 08:03

## 2024-02-28 RX ADMIN — MUPIROCIN 1 APPLICATION: 20 OINTMENT TOPICAL at 08:08

## 2024-02-28 RX ADMIN — ARIPIPRAZOLE 15 MG: 15 TABLET ORAL at 08:06

## 2024-02-28 RX ADMIN — Medication 400 MG: at 08:02

## 2024-02-28 RX ADMIN — CYANOCOBALAMIN TAB 1000 MCG 1000 MCG: 1000 TAB at 08:04

## 2024-02-28 RX ADMIN — CEPHALEXIN 500 MG: 500 CAPSULE ORAL at 06:14

## 2024-02-28 RX ADMIN — Medication 1 TABLET: at 08:03

## 2024-02-28 RX ADMIN — FUROSEMIDE 20 MG: 20 TABLET ORAL at 08:07

## 2024-02-28 RX ADMIN — B-COMPLEX W/ C & FOLIC ACID TAB 1 TABLET: TAB at 08:03

## 2024-02-28 RX ADMIN — FLUTICASONE PROPIONATE 1 SPRAY: 50 SPRAY, METERED NASAL at 08:08

## 2024-02-28 NOTE — DISCHARGE SUMMARY
Discharge Summary - Behavioral Health   Yaritza Dunne 43 y.o. female MRN: [id number]  Unit/Bed#: -02 Encounter: [id number]     Admission Date:   Admission Orders (From admission, onward)       Ordered        02/22/24 1747  ED TO DIFFERENT CAMPUS [facility] UNIT or INPATIENT MEDICAL UNIT to [facility] UNIT (using Discharge Readmit Navigator) - Admit Patient to  Behavioral Health Unit  Once                                Discharge Date: 2/28/2024    Attending Psychiatrist: Baudilio Zimmerman MD    Reason for Admission/HPI:   History of Present Illness     Yaritza was seen alongside the medical student.  Patient is a 43-year-old female who carries a past psychiatric history significant for bipolar 2 disorder mild intellectual disability is admitted to the unit on a 302 petition due to violent outbursts and self-harm at group home.  She attempted to cut her throat with a shard of glass after an outburst/argument with roommate.  Patient is generally a limited historian although was able to report that she has been having more frequent outbursts over the past few weeks, often triggered in which she will become aggressive, agitated and attempt to harm herself or property.  Reports having an incident in which she attempted to punch through a glass window.  She reports after an incident such as this occurs she has limited recollection and cannot recall exactly what happened.  She is open to having medications adjusted during her inpatient stay to better stabilize her symptoms.  Currently she reports some issues with poor sleep, decreased energy levels although denies any significant anhedonia or anxiety symptoms.  Denies any SI or HI presently.  No auditory or visual hallucinations reported.       Hospital Course:   Yaritza Dunne was admitted to the inpatient psychiatric unit and was monitored closely. During the hospitalization she was attending individual therapy, group therapy, milieu therapy and occupational therapy.    Patient was  admitted for suicide attempt after an outburst at the group home where she attempted to cut her throat.  Upon arrival to the unit previous medications were continued with some minimal adjustments.  Abilify 15 mg was continued for mood stabilization.  Depakote was increased to 1250 mg at bedtime for mood stabilization.  Zoloft Was continued at 150 mg daily.  Klonopin was reduced to 0.5 mg twice daily for anxiety and agitation.  After acclimating to the unit, patient's symptoms gradually improved.  She became more active in the milieu.  Interacting appropriately with staff and peers.  Attending group activities, particularly enjoying art group.  Her sleep, energy, appetite levels improved.  She was able to note overall tolerability of medications.  No EPS or abnormal side effects were reported.  Counseled on risk for liver toxicity with Depakote and risk for metabolic syndrome with antipsychotics.  Depakote was checked and found to be within therapeutic range at 66 mcg/mL prior to discharge.  Due to improvement in symptoms and her mood she was determined stable for discharge.  She adamantly denied any SI, HI, AVH.  Returned back to her group home [facility] for outpatient follow-up and residential services.    Yaritza Dunne agreed to continue medications and to follow-up with outpatient appointments.     Mental Status at time of Discharge:   Appearance:  casually dressed   Behavior:  pleasant, cooperative, calm   Speech:  loud   Mood:  euthymic   Affect:  brighter   Thought Process:  goal directed   Associations: concrete associations   Thought Content:  no overt delusions   Perceptual Disturbances: no auditory hallucinations, no visual hallucinations, does not appear responding to internal stimuli   Risk Potential: Suicidal ideation - None at present  Homicidal ideation - None at present  Potential for aggression - No   Sensorium:  oriented to person, place, and time/date   Memory:  recent and remote memory grossly  intact   Consciousness:  alert and awake   Attention/Concentration: attention span and concentration are age appropriate   Insight:  limited   Judgment: limited   Gait/Station: normal gait/station   Motor Activity: no abnormal movements       Admission Diagnosis:    Principal Problem:    Unspecified mood (affective) disorder (HCC)  Active Problems:    Intermittent explosive disorder    Intellectual disability    Epilepsy (HCC)    Adrenocortical insufficiency (HCC)    Hypothyroidism    Obesity    Dyslipidemia    Edema    Open toe wound    T wave inversion in EKG      Discharge Diagnosis:     Principal Problem:    Unspecified mood (affective) disorder (HCC)  Active Problems:    Intermittent explosive disorder    Intellectual disability    Epilepsy (HCC)    Adrenocortical insufficiency (HCC)    Hypothyroidism    Obesity    Dyslipidemia    Edema    Open toe wound    T wave inversion in EKG  Resolved Problems:    Medical clearance for psychiatric admission          Laboratory Results: I have personally reviewed all pertinent laboratory/tests results    Results from the past 24 hours:   Recent Results (from the past 24 hour(s))   Valproic acid level, total    Collection Time: 02/27/24  8:11 PM   Result Value Ref Range    Valproic Acid, Total 60 50 - 100 ug/mL   TSH, 3rd generation with Free T4 reflex    Collection Time: 02/28/24  7:30 AM   Result Value Ref Range    TSH 3RD GENERATON <0.010 (L) 0.450 - 4.500 uIU/mL       Discharge Medications:  See after visit summary for reconciled discharge medications provided to patient and family.      Discharge instructions/Information to patient and family:   See after visit summary for information provided to patient and family.      Provisions for Follow-Up Care:  See after visit summary for information related to follow-up care and any pertinent home health orders.      Discharge on Two Antipsychotic Medications : No    Suicide/Homicide Risk Assessment:    Risk of Harm to Self:    The following ratings are based on assessment at the time of discharge  Demographic risk factors include: none  Historical Risk Factors include: chronic mood disorder, history of psychosis, history of suicidal behaviors  Current Specific Risk Factors include: recent inpatient psychiatric admission - being discharged today  Protective Factors: no current suicidal ideation, stable mood, no current psychotic symptoms, improved mood, improved anxiety symptoms, improved impulse control, ability to adapt to change, ability to manage anger well, ability to make plans for the future, being discharged to a supportive environment (group home)  Weapons/Firearms: none. The following steps have been taken to ensure weapons are properly secured: not applicable  Based on today's assessment, Yaritza presents the following risk of harm to self: low    Risk of Harm to Others:  The following ratings are based on assessment at the time of discharge  Demographic Risk Factors include: under age 40.  Historical Risk Factors include: none.  Current Specific Risk Factors include: recent difficulty with impulse control  Protective Factors: no current homicidal ideation, improved impulse control, stable mood, no current psychotic symptoms, compliant with medications, compliant with treatment, willing to continue psychiatric treatment, willing to remain free from substance use, outpatient follow up established, being discharged to a supportive environment (group home)  Weapons/Firearms: none. The following steps have been taken to ensure weapons are properly secured: not applicable  Based on today's assessment, Yaritza presents the following risk of harm to others: low      Discharge Statement   I spent 40 minutes discharging the patient. This time was spent on the day of discharge. I had direct contact with the patient on the day of discharge. Additional documentation is required if more than 30 minutes were spent on discharge.     I reviewed  with Yaritza importance of compliance with medications and outpatient treatment after discharge.  I discussed the medication regimen and possible side effects of the medications with Yaritza prior to discharge. At the time of discharge she was tolerating psychiatric medications.  I discussed outpatient follow up with Yaritza.  I reviewed with Yaritza crisis plan and safety plan upon discharge.

## 2024-02-28 NOTE — PROGRESS NOTES
02/28/24 0925   Activity/Group Checklist   Group Admission/Discharge  (Relapse Prevention Plan)   Attendance Attended   Attendance Duration (min) 0-15   Interactions Interacted appropriately   Affect/Mood Appropriate   Goals Achieved Identified feelings;Identified triggers;Identified relapse prevention strategies;Identified medication adherence strategies;Discussed safety plan;Discussed coping strategies;Discussed discharge plans;Identified resources and support systems;Able to listen to others;Able to engage in interactions;Able to self-disclose;Able to recieve feedback     Relapse Prevention Plan completed with pt. Pt requested assistance with reading and writing the plan. Pt was able to answer questions appropriately with prompting. Pt eager for discharge.    Plan placed in scan bin and blue dc folder.

## 2024-02-28 NOTE — PROGRESS NOTES
02/28/24 0926   Team Meeting   Meeting Type Daily Rounds   Team Members Present   Team Members Present Physician;Nurse;   Physician Team Member Erasmo   Nursing Team Member Fatmata   Care Management Team Member Tab   Patient/Family Present   Patient Present No   Patient's Family Present No     Patient currently holds 303 status. Patient denies all symptoms. Patient is medication and meal compliant. Patient reports no needs are unmet. Patient to continue on all current scheduled medications. Patient to discharge back to care home tomorrow.

## 2024-02-28 NOTE — PLAN OF CARE
Problem: DISCHARGE PLANNING  Goal: Discharge to home or other facility with appropriate resources  Description: INTERVENTIONS:  - Identify barriers to discharge w/patient and caregiver  - Arrange for needed discharge resources and transportation as appropriate  - Identify discharge learning needs (meds, wound care, etc.)  - Arrange for interpretive services to assist at discharge as needed  - Refer to Case Management Department for coordinating discharge planning if the patient needs post-hospital services based on physician/advanced practitioner order or complex needs related to functional status, cognitive ability, or social support system  Outcome: Completed     Problem: SELF HARM/SUICIDALITY  Goal: Will have no self-injury during hospital stay  Description: INTERVENTIONS:  - Q 15 MINUTES: Routine safety checks  - Q WAKING SHIFT & PRN: Assess risk to determine if routine checks are adequate to maintain patient safety  - Encourage patient to participate actively in care by formulating a plan to combat response to suicidal ideation, identify supports and resources  Outcome: Completed     Problem: BEHAVIOR  Goal: Pt/Family maintain appropriate behavior and adhere to behavioral management agreement, if implemented  Description: INTERVENTIONS:  - Assess the family dynamic   - Encourage verbalization of thoughts and concerns in a socially appropriate manner  - Assess patient/family's coping skills and non-compliant behavior (including use of illegal substances).  - Utilize positive, consistent limit setting strategies supporting safety of patient, staff and others  - Initiate consult with Case Management, Spiritual Care or other ancillary services as appropriate  - If a patient's/visitor's behavior jeopardizes the safety of the patient, staff, or others, refer to organization procedure.   - Notify Security of behavior or suspected illegal substances which indicate the need for search of the patient and/or  belongings  - Encourage participation in the decision making process about a behavioral management agreement; implement if patient meets criteria  Outcome: Completed     Problem: INVOLUNTARY ADMIT  Goal: Will cooperate with staff recommendations and doctor's orders and will demonstrate appropriate behavior  Description: INTERVENTIONS:  - Treat underlying conditions and offer medication as ordered  - Educate regarding involuntary admission procedures and rules  - Utilize positive consistent limit setting strategies to support patient and staff safety  Outcome: Completed     Problem: SELF CARE DEFICIT  Goal: Return ADL status to a safe level of function  Description: INTERVENTIONS:  - Administer medication as ordered  - Assess ADL deficits and provide assistive devices as needed  - Obtain PT/OT consults as needed  - Assist and instruct patient to increase activity and self care as tolerated  Outcome: Completed     Problem: Ineffective Coping  Goal: Identifies ineffective coping skills  Outcome: Completed  Goal: Demonstrates healthy coping skills  Outcome: Completed  Goal: Participates in unit activities  Description: Interventions:  - Provide therapeutic environment   - Provide required programming   - Redirect inappropriate behaviors   Outcome: Completed     Problem: Risk for Violence/Aggression Toward Others  Goal: Treatment Goal: Refrain from acts of violence/aggression during length of stay, and demonstrate improved impulse control at the time of discharge  Outcome: Completed     Problem: DISCHARGE PLANNING - CARE MANAGEMENT  Goal: Discharge to post-acute care or home with appropriate resources  Description: INTERVENTIONS:  - Conduct assessment to determine patient/family and health care team treatment goals, and need for post-acute services based on payer coverage, community resources, and patient preferences, and barriers to discharge  - Address psychosocial, clinical, and financial barriers to discharge as  identified in assessment in conjunction with the patient/family and health care team  - Arrange appropriate level of post-acute services according to patient’s   needs and preference and payer coverage in collaboration with the physician and health care team  - Communicate with and update the patient/family, physician, and health care team regarding progress on the discharge plan  - Arrange appropriate transportation to post-acute venues  Outcome: Completed

## 2024-02-28 NOTE — NURSING NOTE
Pt remained in behavioral control. Pt c/o ingrown toenails. Pt informed there are no podiatrists that can see her, but with discharge tomorrow she can schedule an appointment with her regular podiatrist. Pt denies all symptoms.    This note was copied from a baby's chart.  Follow up lactation visit with Lora, NATI and baby girl. Lora reports infant is breastfeeding very well, supplementing with EBM/formula d/t hyperbili. Lora feels comfortable latching infant, feels milk is starting to come in. No questions at this time, planning to discharge today.

## 2024-02-28 NOTE — NURSING NOTE
"Pt awake most of the night, \"because I'm happy and excited to leave!\" Pt refusing labs for the morning, stating, \"they are sticking me too much! I don't want anymore.\"  "

## 2024-02-28 NOTE — NURSING NOTE
Patient awoke and completed morning routine. Denies SI, HI, SIB, auditory and visual hallucinations. Med and meal compliant. Patient states she is looking forward to discharge today. Discharged with all belongings at 1155.

## 2024-02-28 NOTE — NURSING NOTE
"Patient is calm and cooperative on the unit. Denies SI, HI, SIB, auditory and visual hallucinations at this time. Patient is med and meal compliant. Visible on the unit and social with peers. Intrusive at times. Patient states she is \"excited to go home\". Denies any unmet needs at this time. Q7 safety checks ongoing.  "

## 2024-02-28 NOTE — PLAN OF CARE
Problem: DISCHARGE PLANNING  Goal: Discharge to home or other facility with appropriate resources  Description: INTERVENTIONS:  - Identify barriers to discharge w/patient and caregiver  - Arrange for needed discharge resources and transportation as appropriate  - Identify discharge learning needs (meds, wound care, etc.)  - Arrange for interpretive services to assist at discharge as needed  - Refer to Case Management Department for coordinating discharge planning if the patient needs post-hospital services based on physician/advanced practitioner order or complex needs related to functional status, cognitive ability, or social support system  Outcome: Progressing     Problem: SELF HARM/SUICIDALITY  Goal: Will have no self-injury during hospital stay  Description: INTERVENTIONS:  - Q 15 MINUTES: Routine safety checks  - Q WAKING SHIFT & PRN: Assess risk to determine if routine checks are adequate to maintain patient safety  - Encourage patient to participate actively in care by formulating a plan to combat response to suicidal ideation, identify supports and resources  Outcome: Progressing     Problem: BEHAVIOR  Goal: Pt/Family maintain appropriate behavior and adhere to behavioral management agreement, if implemented  Description: INTERVENTIONS:  - Assess the family dynamic   - Encourage verbalization of thoughts and concerns in a socially appropriate manner  - Assess patient/family's coping skills and non-compliant behavior (including use of illegal substances).  - Utilize positive, consistent limit setting strategies supporting safety of patient, staff and others  - Initiate consult with Case Management, Spiritual Care or other ancillary services as appropriate  - If a patient's/visitor's behavior jeopardizes the safety of the patient, staff, or others, refer to organization procedure.   - Notify Security of behavior or suspected illegal substances which indicate the need for search of the patient and/or  belongings  - Encourage participation in the decision making process about a behavioral management agreement; implement if patient meets criteria  Outcome: Progressing     Problem: INVOLUNTARY ADMIT  Goal: Will cooperate with staff recommendations and doctor's orders and will demonstrate appropriate behavior  Description: INTERVENTIONS:  - Treat underlying conditions and offer medication as ordered  - Educate regarding involuntary admission procedures and rules  - Utilize positive consistent limit setting strategies to support patient and staff safety  Outcome: Progressing     Problem: SELF CARE DEFICIT  Goal: Return ADL status to a safe level of function  Description: INTERVENTIONS:  - Administer medication as ordered  - Assess ADL deficits and provide assistive devices as needed  - Obtain PT/OT consults as needed  - Assist and instruct patient to increase activity and self care as tolerated  Outcome: Progressing     Problem: Ineffective Coping  Goal: Identifies ineffective coping skills  Outcome: Progressing  Goal: Demonstrates healthy coping skills  Outcome: Progressing  Goal: Participates in unit activities  Description: Interventions:  - Provide therapeutic environment   - Provide required programming   - Redirect inappropriate behaviors   Outcome: Progressing     Problem: Risk for Violence/Aggression Toward Others  Goal: Treatment Goal: Refrain from acts of violence/aggression during length of stay, and demonstrate improved impulse control at the time of discharge  Outcome: Progressing     Problem: DISCHARGE PLANNING - CARE MANAGEMENT  Goal: Discharge to post-acute care or home with appropriate resources  Description: INTERVENTIONS:  - Conduct assessment to determine patient/family and health care team treatment goals, and need for post-acute services based on payer coverage, community resources, and patient preferences, and barriers to discharge  - Address psychosocial, clinical, and financial barriers to  discharge as identified in assessment in conjunction with the patient/family and health care team  - Arrange appropriate level of post-acute services according to patient’s   needs and preference and payer coverage in collaboration with the physician and health care team  - Communicate with and update the patient/family, physician, and health care team regarding progress on the discharge plan  - Arrange appropriate transportation to post-acute venues  Outcome: Progressing

## 2024-02-28 NOTE — CASE MANAGEMENT
SHANEKA called patients group home to request for a  time. SHANEKA was told that staff from [facility] would be here to  patient around 11:30am- 12pm.    n/a

## 2024-02-29 NOTE — QUICK NOTE
Received notification of patient's TSH and Free T4 levels which resulted after she was discharged back to her long term care facility. TSH suppressed and Free T4 elevated.    I called the facility with these results on 2/29/24 at 1420. Per nursing, patient's thyroid panel is always abnormal, she follows with endocrinology in [place], and they do not adjust her Synthroid even if labs are abnormal. Recommended she follow-up with her endocrinologist.

## 2024-03-19 ENCOUNTER — HOSPITAL ENCOUNTER (OUTPATIENT)
Dept: RADIOLOGY | Facility: HOSPITAL | Age: 44
Discharge: HOME | End: 2024-03-19
Payer: MEDICARE

## 2024-03-19 ENCOUNTER — HOSPITAL ENCOUNTER (EMERGENCY)
Facility: HOSPITAL | Age: 44
End: 2024-03-20
Attending: EMERGENCY MEDICINE
Payer: MEDICARE

## 2024-03-19 DIAGNOSIS — S89.142A: ICD-10-CM

## 2024-03-19 DIAGNOSIS — F79 INTELLECTUAL DISABILITY: ICD-10-CM

## 2024-03-19 DIAGNOSIS — E78.5 DYSLIPIDEMIA: ICD-10-CM

## 2024-03-19 DIAGNOSIS — R93.5 ABNORMAL CT OF THE ABDOMEN: ICD-10-CM

## 2024-03-19 DIAGNOSIS — E27.40 ADRENOCORTICAL INSUFFICIENCY (HCC): ICD-10-CM

## 2024-03-19 DIAGNOSIS — F31.9 BIPOLAR DISORDER (HCC): ICD-10-CM

## 2024-03-19 DIAGNOSIS — E03.9 HYPOTHYROIDISM: ICD-10-CM

## 2024-03-19 DIAGNOSIS — R94.31 T WAVE INVERSION IN EKG: ICD-10-CM

## 2024-03-19 DIAGNOSIS — Z91.89 POTENTIAL FOR INTENTIONAL SELF-HARM: Primary | ICD-10-CM

## 2024-03-19 DIAGNOSIS — G40.909 EPILEPSY (HCC): ICD-10-CM

## 2024-03-19 PROCEDURE — A9585 GADOBUTROL INJECTION: HCPCS | Performed by: RADIOLOGY

## 2024-03-19 PROCEDURE — 82075 ASSAY OF BREATH ETHANOL: CPT | Performed by: EMERGENCY MEDICINE

## 2024-03-19 PROCEDURE — 99285 EMERGENCY DEPT VISIT HI MDM: CPT

## 2024-03-19 PROCEDURE — 81025 URINE PREGNANCY TEST: CPT | Performed by: EMERGENCY MEDICINE

## 2024-03-19 PROCEDURE — 99285 EMERGENCY DEPT VISIT HI MDM: CPT | Performed by: EMERGENCY MEDICINE

## 2024-03-19 PROCEDURE — 80307 DRUG TEST PRSMV CHEM ANLYZR: CPT | Performed by: EMERGENCY MEDICINE

## 2024-03-19 PROCEDURE — 74183 MRI ABD W/O CNTR FLWD CNTR: CPT

## 2024-03-19 RX ORDER — GADOBUTROL 604.72 MG/ML
13 INJECTION INTRAVENOUS
Status: COMPLETED | OUTPATIENT
Start: 2024-03-19 | End: 2024-03-19

## 2024-03-19 RX ADMIN — GADOBUTROL 13 ML: 604.72 INJECTION INTRAVENOUS at 11:25

## 2024-03-20 ENCOUNTER — APPOINTMENT (EMERGENCY)
Dept: RADIOLOGY | Facility: HOSPITAL | Age: 44
End: 2024-03-20
Payer: MEDICARE

## 2024-03-20 ENCOUNTER — HOSPITAL ENCOUNTER (INPATIENT)
Facility: HOSPITAL | Age: 44
LOS: 12 days | Discharge: HOME | DRG: 885 | End: 2024-04-01
Attending: HOSPITALIST | Admitting: PSYCHIATRY & NEUROLOGY
Payer: MEDICARE

## 2024-03-20 VITALS
DIASTOLIC BLOOD PRESSURE: 66 MMHG | TEMPERATURE: 97.9 F | RESPIRATION RATE: 16 BRPM | HEART RATE: 75 BPM | SYSTOLIC BLOOD PRESSURE: 124 MMHG | OXYGEN SATURATION: 99 %

## 2024-03-20 DIAGNOSIS — M54.41 CHRONIC BILATERAL LOW BACK PAIN WITH RIGHT-SIDED SCIATICA: ICD-10-CM

## 2024-03-20 DIAGNOSIS — G89.29 CHRONIC BILATERAL LOW BACK PAIN WITH RIGHT-SIDED SCIATICA: ICD-10-CM

## 2024-03-20 DIAGNOSIS — R42 VERTIGO: ICD-10-CM

## 2024-03-20 DIAGNOSIS — S89.142A: ICD-10-CM

## 2024-03-20 DIAGNOSIS — F39 UNSPECIFIED MOOD (AFFECTIVE) DISORDER (HCC): ICD-10-CM

## 2024-03-20 DIAGNOSIS — N39.0 UTI (URINARY TRACT INFECTION): ICD-10-CM

## 2024-03-20 DIAGNOSIS — E56.9 VITAMIN DEFICIENCY: ICD-10-CM

## 2024-03-20 DIAGNOSIS — R94.31 T WAVE INVERSION IN EKG: ICD-10-CM

## 2024-03-20 DIAGNOSIS — L30.4 INTERTRIGO: ICD-10-CM

## 2024-03-20 DIAGNOSIS — E78.5 HYPERLIPIDEMIA: ICD-10-CM

## 2024-03-20 DIAGNOSIS — L30.9 ECZEMA: ICD-10-CM

## 2024-03-20 DIAGNOSIS — F31.63 BIPOLAR 1 DISORDER, MIXED, SEVERE (HCC): Primary | ICD-10-CM

## 2024-03-20 DIAGNOSIS — F79 INTELLECTUAL DISABILITY: ICD-10-CM

## 2024-03-20 DIAGNOSIS — F63.81 INTERMITTENT EXPLOSIVE DISORDER: ICD-10-CM

## 2024-03-20 DIAGNOSIS — R60.9 EDEMA, UNSPECIFIED TYPE: ICD-10-CM

## 2024-03-20 DIAGNOSIS — E78.5 DYSLIPIDEMIA: ICD-10-CM

## 2024-03-20 DIAGNOSIS — E27.40 ADRENOCORTICAL INSUFFICIENCY (HCC): ICD-10-CM

## 2024-03-20 DIAGNOSIS — K59.00 CONSTIPATION, UNSPECIFIED CONSTIPATION TYPE: ICD-10-CM

## 2024-03-20 DIAGNOSIS — G40.909 EPILEPSY (HCC): ICD-10-CM

## 2024-03-20 DIAGNOSIS — E03.9 HYPOTHYROIDISM: ICD-10-CM

## 2024-03-20 LAB
EXT PREGNANCY TEST URINE: NEGATIVE
EXT. CONTROL: NORMAL

## 2024-03-20 PROCEDURE — 73110 X-RAY EXAM OF WRIST: CPT

## 2024-03-20 PROCEDURE — 73610 X-RAY EXAM OF ANKLE: CPT

## 2024-03-20 RX ORDER — OLANZAPINE 2.5 MG/1
2.5 TABLET, FILM COATED ORAL
Status: CANCELLED | OUTPATIENT
Start: 2024-03-20

## 2024-03-20 RX ORDER — MELOXICAM 7.5 MG/1
15 TABLET ORAL DAILY PRN
Status: DISCONTINUED | OUTPATIENT
Start: 2024-03-20 | End: 2024-04-01 | Stop reason: HOSPADM

## 2024-03-20 RX ORDER — LANOLIN ALCOHOL/MO/W.PET/CERES
400 CREAM (GRAM) TOPICAL 2 TIMES DAILY
Status: DISCONTINUED | OUTPATIENT
Start: 2024-03-20 | End: 2024-04-01 | Stop reason: HOSPADM

## 2024-03-20 RX ORDER — ARIPIPRAZOLE 5 MG/1
15 TABLET ORAL DAILY
Status: DISCONTINUED | OUTPATIENT
Start: 2024-03-20 | End: 2024-03-20 | Stop reason: HOSPADM

## 2024-03-20 RX ORDER — ACETAMINOPHEN 325 MG/1
650 TABLET ORAL EVERY 4 HOURS PRN
Status: CANCELLED | OUTPATIENT
Start: 2024-03-20

## 2024-03-20 RX ORDER — DIPHENOXYLATE HYDROCHLORIDE AND ATROPINE SULFATE 2.5; .025 MG/1; MG/1
1 TABLET ORAL ONCE
Status: COMPLETED | OUTPATIENT
Start: 2024-03-20 | End: 2024-03-20

## 2024-03-20 RX ORDER — LORAZEPAM 2 MG/ML
2 INJECTION INTRAMUSCULAR
Status: DISCONTINUED | OUTPATIENT
Start: 2024-03-20 | End: 2024-04-01 | Stop reason: HOSPADM

## 2024-03-20 RX ORDER — LIDOCAINE 50 MG/G
1 PATCH TOPICAL DAILY PRN
Status: DISCONTINUED | OUTPATIENT
Start: 2024-03-20 | End: 2024-03-23

## 2024-03-20 RX ORDER — ACETAMINOPHEN 325 MG/1
650 TABLET ORAL EVERY 6 HOURS PRN
Status: CANCELLED | OUTPATIENT
Start: 2024-03-20

## 2024-03-20 RX ORDER — LEVOTHYROXINE SODIUM 112 UG/1
224 TABLET ORAL
Status: DISCONTINUED | OUTPATIENT
Start: 2024-03-20 | End: 2024-03-20 | Stop reason: HOSPADM

## 2024-03-20 RX ORDER — LORAZEPAM 2 MG/ML
1 INJECTION INTRAMUSCULAR EVERY 4 HOURS PRN
Status: CANCELLED | OUTPATIENT
Start: 2024-03-20

## 2024-03-20 RX ORDER — MELOXICAM 7.5 MG/1
15 TABLET ORAL DAILY PRN
Status: DISCONTINUED | OUTPATIENT
Start: 2024-03-20 | End: 2024-03-20

## 2024-03-20 RX ORDER — ACETAMINOPHEN 325 MG/1
975 TABLET ORAL EVERY 6 HOURS PRN
Status: CANCELLED | OUTPATIENT
Start: 2024-03-20

## 2024-03-20 RX ORDER — TRAZODONE HYDROCHLORIDE 100 MG/1
100 TABLET ORAL
Status: DISCONTINUED | OUTPATIENT
Start: 2024-03-20 | End: 2024-04-01 | Stop reason: HOSPADM

## 2024-03-20 RX ORDER — LANOLIN ALCOHOL/MO/W.PET/CERES
1 CREAM (GRAM) TOPICAL 2 TIMES DAILY WITH MEALS
Status: DISCONTINUED | OUTPATIENT
Start: 2024-03-20 | End: 2024-03-20 | Stop reason: HOSPADM

## 2024-03-20 RX ORDER — OLANZAPINE 10 MG/2ML
5 INJECTION, POWDER, FOR SOLUTION INTRAMUSCULAR
Status: CANCELLED | OUTPATIENT
Start: 2024-03-20

## 2024-03-20 RX ORDER — ACETAMINOPHEN 325 MG/1
975 TABLET ORAL EVERY 6 HOURS PRN
Status: DISCONTINUED | OUTPATIENT
Start: 2024-03-20 | End: 2024-04-01 | Stop reason: HOSPADM

## 2024-03-20 RX ORDER — HYDROCORTISONE 10 MG/1
5 TABLET ORAL DAILY
Status: DISCONTINUED | OUTPATIENT
Start: 2024-03-20 | End: 2024-03-20 | Stop reason: HOSPADM

## 2024-03-20 RX ORDER — MAGNESIUM HYDROXIDE/ALUMINUM HYDROXICE/SIMETHICONE 120; 1200; 1200 MG/30ML; MG/30ML; MG/30ML
30 SUSPENSION ORAL EVERY 4 HOURS PRN
Status: DISCONTINUED | OUTPATIENT
Start: 2024-03-20 | End: 2024-04-01 | Stop reason: HOSPADM

## 2024-03-20 RX ORDER — LEVOTHYROXINE SODIUM 112 UG/1
224 TABLET ORAL
Status: DISCONTINUED | OUTPATIENT
Start: 2024-03-21 | End: 2024-03-20

## 2024-03-20 RX ORDER — HYDROCORTISONE 10 MG/1
15 TABLET ORAL
Status: DISCONTINUED | OUTPATIENT
Start: 2024-03-20 | End: 2024-03-20 | Stop reason: HOSPADM

## 2024-03-20 RX ORDER — CLONAZEPAM 0.5 MG/1
0.5 TABLET ORAL 2 TIMES DAILY
Status: DISCONTINUED | OUTPATIENT
Start: 2024-03-20 | End: 2024-04-01 | Stop reason: HOSPADM

## 2024-03-20 RX ORDER — HYDROXYZINE 50 MG/1
50 TABLET, FILM COATED ORAL
Status: DISCONTINUED | OUTPATIENT
Start: 2024-03-20 | End: 2024-04-01 | Stop reason: HOSPADM

## 2024-03-20 RX ORDER — FUROSEMIDE 40 MG/1
20 TABLET ORAL DAILY
Status: DISCONTINUED | OUTPATIENT
Start: 2024-03-20 | End: 2024-03-20 | Stop reason: HOSPADM

## 2024-03-20 RX ORDER — ACETAMINOPHEN 325 MG/1
650 TABLET ORAL EVERY 4 HOURS PRN
Status: DISCONTINUED | OUTPATIENT
Start: 2024-03-20 | End: 2024-03-20 | Stop reason: HOSPADM

## 2024-03-20 RX ORDER — ACETAMINOPHEN 325 MG/1
650 TABLET ORAL EVERY 6 HOURS PRN
Status: DISCONTINUED | OUTPATIENT
Start: 2024-03-20 | End: 2024-04-01 | Stop reason: HOSPADM

## 2024-03-20 RX ORDER — LORAZEPAM 2 MG/ML
2 INJECTION INTRAMUSCULAR
Status: CANCELLED | OUTPATIENT
Start: 2024-03-20

## 2024-03-20 RX ORDER — OLANZAPINE 10 MG/1
10 TABLET ORAL
Status: DISCONTINUED | OUTPATIENT
Start: 2024-03-20 | End: 2024-04-01 | Stop reason: HOSPADM

## 2024-03-20 RX ORDER — MECLIZINE HYDROCHLORIDE 25 MG/1
25 TABLET ORAL EVERY 12 HOURS PRN
Status: DISCONTINUED | OUTPATIENT
Start: 2024-03-20 | End: 2024-03-20 | Stop reason: HOSPADM

## 2024-03-20 RX ORDER — POLYETHYLENE GLYCOL 3350 17 G/17G
17 POWDER, FOR SOLUTION ORAL DAILY PRN
Status: CANCELLED | OUTPATIENT
Start: 2024-03-20

## 2024-03-20 RX ORDER — LORAZEPAM 1 MG/1
1 TABLET ORAL EVERY 6 HOURS PRN
Status: CANCELLED | OUTPATIENT
Start: 2024-03-20

## 2024-03-20 RX ORDER — TRAZODONE HYDROCHLORIDE 100 MG/1
100 TABLET ORAL
Status: CANCELLED | OUTPATIENT
Start: 2024-03-20

## 2024-03-20 RX ORDER — MECLIZINE HCL 12.5 MG/1
25 TABLET ORAL EVERY 12 HOURS PRN
Status: DISCONTINUED | OUTPATIENT
Start: 2024-03-20 | End: 2024-04-01 | Stop reason: HOSPADM

## 2024-03-20 RX ORDER — MELATONIN
2000 DAILY
Status: DISCONTINUED | OUTPATIENT
Start: 2024-03-21 | End: 2024-04-01 | Stop reason: HOSPADM

## 2024-03-20 RX ORDER — OLANZAPINE 2.5 MG/1
2.5 TABLET, FILM COATED ORAL
Status: DISCONTINUED | OUTPATIENT
Start: 2024-03-20 | End: 2024-04-01 | Stop reason: HOSPADM

## 2024-03-20 RX ORDER — OLANZAPINE 2.5 MG/1
10 TABLET, FILM COATED ORAL
Status: CANCELLED | OUTPATIENT
Start: 2024-03-20

## 2024-03-20 RX ORDER — OLANZAPINE 10 MG/2ML
10 INJECTION, POWDER, FOR SOLUTION INTRAMUSCULAR
Status: DISCONTINUED | OUTPATIENT
Start: 2024-03-20 | End: 2024-04-01 | Stop reason: HOSPADM

## 2024-03-20 RX ORDER — DOCUSATE SODIUM 100 MG/1
100 CAPSULE, LIQUID FILLED ORAL DAILY
Status: DISCONTINUED | OUTPATIENT
Start: 2024-03-21 | End: 2024-04-01 | Stop reason: HOSPADM

## 2024-03-20 RX ORDER — MAGNESIUM HYDROXIDE/ALUMINUM HYDROXICE/SIMETHICONE 120; 1200; 1200 MG/30ML; MG/30ML; MG/30ML
30 SUSPENSION ORAL EVERY 4 HOURS PRN
Status: CANCELLED | OUTPATIENT
Start: 2024-03-20

## 2024-03-20 RX ORDER — FUROSEMIDE 40 MG/1
20 TABLET ORAL DAILY
Status: DISCONTINUED | OUTPATIENT
Start: 2024-03-21 | End: 2024-03-24

## 2024-03-20 RX ORDER — CLONAZEPAM 0.5 MG/1
0.5 TABLET ORAL 2 TIMES DAILY
Status: DISCONTINUED | OUTPATIENT
Start: 2024-03-20 | End: 2024-03-20 | Stop reason: HOSPADM

## 2024-03-20 RX ORDER — OLANZAPINE 10 MG/2ML
10 INJECTION, POWDER, FOR SOLUTION INTRAMUSCULAR
Status: CANCELLED | OUTPATIENT
Start: 2024-03-20

## 2024-03-20 RX ORDER — LEVOTHYROXINE SODIUM 112 UG/1
336 TABLET ORAL
Status: DISCONTINUED | OUTPATIENT
Start: 2024-03-23 | End: 2024-03-20 | Stop reason: HOSPADM

## 2024-03-20 RX ORDER — LORAZEPAM 2 MG/ML
1 INJECTION INTRAMUSCULAR EVERY 4 HOURS PRN
Status: DISCONTINUED | OUTPATIENT
Start: 2024-03-20 | End: 2024-04-01 | Stop reason: HOSPADM

## 2024-03-20 RX ORDER — ALBUTEROL SULFATE 90 UG/1
2 AEROSOL, METERED RESPIRATORY (INHALATION) EVERY 6 HOURS PRN
Status: DISCONTINUED | OUTPATIENT
Start: 2024-03-20 | End: 2024-04-01 | Stop reason: HOSPADM

## 2024-03-20 RX ORDER — LEVOTHYROXINE SODIUM 112 UG/1
224 TABLET ORAL
Status: DISCONTINUED | OUTPATIENT
Start: 2024-03-21 | End: 2024-03-21

## 2024-03-20 RX ORDER — BENZTROPINE MESYLATE 1 MG/1
1 TABLET ORAL EVERY 6 HOURS PRN
Status: DISCONTINUED | OUTPATIENT
Start: 2024-03-20 | End: 2024-04-01 | Stop reason: HOSPADM

## 2024-03-20 RX ORDER — BENZTROPINE MESYLATE 1 MG/1
1 TABLET ORAL EVERY 6 HOURS PRN
Status: CANCELLED | OUTPATIENT
Start: 2024-03-20

## 2024-03-20 RX ORDER — OLANZAPINE 10 MG/2ML
5 INJECTION, POWDER, FOR SOLUTION INTRAMUSCULAR
Status: DISCONTINUED | OUTPATIENT
Start: 2024-03-20 | End: 2024-04-01 | Stop reason: HOSPADM

## 2024-03-20 RX ORDER — OLANZAPINE 5 MG/1
5 TABLET ORAL
Status: DISCONTINUED | OUTPATIENT
Start: 2024-03-20 | End: 2024-04-01 | Stop reason: HOSPADM

## 2024-03-20 RX ORDER — LEVOTHYROXINE SODIUM 112 UG/1
336 TABLET ORAL
Status: DISCONTINUED | OUTPATIENT
Start: 2024-03-23 | End: 2024-04-01 | Stop reason: HOSPADM

## 2024-03-20 RX ORDER — TRIAMCINOLONE ACETONIDE 1 MG/G
CREAM TOPICAL 2 TIMES DAILY PRN
Status: DISCONTINUED | OUTPATIENT
Start: 2024-03-20 | End: 2024-04-01 | Stop reason: HOSPADM

## 2024-03-20 RX ORDER — HYDROXYZINE HYDROCHLORIDE 25 MG/1
25 TABLET, FILM COATED ORAL
Status: DISCONTINUED | OUTPATIENT
Start: 2024-03-20 | End: 2024-04-01 | Stop reason: HOSPADM

## 2024-03-20 RX ORDER — HYDROXYZINE HYDROCHLORIDE 25 MG/1
50 TABLET, FILM COATED ORAL
Status: CANCELLED | OUTPATIENT
Start: 2024-03-20

## 2024-03-20 RX ORDER — LORAZEPAM 1 MG/1
1 TABLET ORAL EVERY 6 HOURS PRN
Status: DISCONTINUED | OUTPATIENT
Start: 2024-03-20 | End: 2024-04-01 | Stop reason: HOSPADM

## 2024-03-20 RX ORDER — LANOLIN ALCOHOL/MO/W.PET/CERES
1 CREAM (GRAM) TOPICAL 2 TIMES DAILY WITH MEALS
Status: DISCONTINUED | OUTPATIENT
Start: 2024-03-20 | End: 2024-04-01 | Stop reason: HOSPADM

## 2024-03-20 RX ORDER — CHLORAL HYDRATE 500 MG
1000 CAPSULE ORAL 3 TIMES DAILY
Status: DISCONTINUED | OUTPATIENT
Start: 2024-03-20 | End: 2024-04-01 | Stop reason: HOSPADM

## 2024-03-20 RX ORDER — HYDROXYZINE HYDROCHLORIDE 25 MG/1
25 TABLET, FILM COATED ORAL
Status: CANCELLED | OUTPATIENT
Start: 2024-03-20

## 2024-03-20 RX ORDER — OLANZAPINE 2.5 MG/1
5 TABLET, FILM COATED ORAL
Status: CANCELLED | OUTPATIENT
Start: 2024-03-20

## 2024-03-20 RX ORDER — POLYETHYLENE GLYCOL 3350 17 G/17G
17 POWDER, FOR SOLUTION ORAL DAILY PRN
Status: DISCONTINUED | OUTPATIENT
Start: 2024-03-20 | End: 2024-04-01 | Stop reason: HOSPADM

## 2024-03-20 RX ORDER — ACETAMINOPHEN 325 MG/1
650 TABLET ORAL EVERY 4 HOURS PRN
Status: DISCONTINUED | OUTPATIENT
Start: 2024-03-20 | End: 2024-04-01 | Stop reason: HOSPADM

## 2024-03-20 RX ADMIN — DIVALPROEX SODIUM 1250 MG: 250 TABLET, FILM COATED, EXTENDED RELEASE ORAL at 02:39

## 2024-03-20 RX ADMIN — SERTRALINE HYDROCHLORIDE 150 MG: 50 TABLET ORAL at 07:44

## 2024-03-20 RX ADMIN — LEVOTHYROXINE SODIUM 224 MCG: 112 TABLET ORAL at 05:48

## 2024-03-20 RX ADMIN — Medication 400 MG: at 19:51

## 2024-03-20 RX ADMIN — Medication 1 TABLET: at 19:51

## 2024-03-20 RX ADMIN — CLONAZEPAM 0.5 MG: 0.5 TABLET ORAL at 08:55

## 2024-03-20 RX ADMIN — ARIPIPRAZOLE 15 MG: 5 TABLET ORAL at 08:55

## 2024-03-20 RX ADMIN — CLONAZEPAM 0.5 MG: 0.5 TABLET ORAL at 18:10

## 2024-03-20 RX ADMIN — FUROSEMIDE 20 MG: 40 TABLET ORAL at 08:57

## 2024-03-20 RX ADMIN — MUPIROCIN 1 APPLICATION: 20 OINTMENT TOPICAL at 19:53

## 2024-03-20 RX ADMIN — ACETAMINOPHEN 650 MG: 325 TABLET, FILM COATED ORAL at 04:54

## 2024-03-20 RX ADMIN — MECLIZINE HYDROCHLORIDE 25 MG: 25 TABLET ORAL at 02:40

## 2024-03-20 RX ADMIN — OMEGA-3 FATTY ACIDS CAP 1000 MG 1000 MG: 1000 CAP at 19:59

## 2024-03-20 RX ADMIN — HYDROCORTISONE 15 MG: 10 TABLET ORAL at 08:55

## 2024-03-20 RX ADMIN — DIPHENOXYLATE HYDROCHLORIDE AND ATROPINE SULFATE 1 TABLET: .025; 2.5 TABLET ORAL at 07:43

## 2024-03-20 RX ADMIN — Medication 1 TABLET: at 07:43
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
## 2024-03-20 NOTE — ED NOTES
Patient is accepted at [facility]   Patient is accepted by Dr. Leelee Nettles in [place]      Transportation is arranged with [employer].     Transportation is scheduled for 1400   Patient may go to the floor at 1400          Nurse report is to be called to  prior to patient transfer    CD, CW

## 2024-03-20 NOTE — ED NOTES
Pt refuses shower at this time. Pt provided with bath wipes, towel, and scrubs.      Debra Jorgensen  03/20/24 0809

## 2024-03-20 NOTE — ED NOTES
Primary Insurance Authorization for admission:   Medicare Part A and B      Secondary Insurance Authorization for admission (COB):  Phone call placed to [employer]  Phone number: [phone]     Spoke to Fatoumata RODRIGUEZ      ** days approved.  Level of care: **.  Review on **.   Authorization # **.        Eligibility Verification System checked - ([phone]).  Online system / automated system indicates: **    Insurance Authorization for Transportation:    Phone call placed to **.   Phone number **.   Spoke to **.   Authorization #: **

## 2024-03-20 NOTE — ED NOTES
Guerline: Assistant Clinical Director at [facility] @ [phone].    Very helpful and excellent source of info.    MENA Lundberg, CIS ll  03/20/24 00:07

## 2024-03-20 NOTE — ED NOTES
CW faxed pt's 302 petition and face sheet to [facility] for review for an in-network bed tomorrow.    MENA Lundberg, CIS ll  03/19/24 23:47

## 2024-03-20 NOTE — PLAN OF CARE
Problem: Alteration in Thoughts and Perception  Goal: Verbalize thoughts and feelings  Description: Interventions:  - Promote a nonjudgmental and trusting relationship with the patient through active listening and therapeutic communication  - Assess patient's level of functioning, behavior and potential for risk  - Engage patient in 1 on 1 interactions  - Encourage patient to express fears, feelings, frustrations, and discuss symptoms    - [place] patient to reality, help patient recognize reality-based thinking   - Administer medications as ordered and assess for potential side effects  - Provide the patient education related to the signs and symptoms of the illness and desired effects of prescribed medications  Outcome: Progressing     Problem: Ineffective Coping  Goal: Identifies healthy coping skills  Outcome: Progressing     Problem: Risk for Violence/Aggression Toward Others  Goal: Verbalize thoughts and feelings  Description: Interventions:  - Assess and re-assess patient's level of risk, every waking shift  - Engage patient in 1:1 interactions, daily, for a minimum of 15 minutes   - Allow patient to express feelings and frustrations in a safe and non-threatening manner   - Establish rapport/trust with patient   Outcome: Progressing

## 2024-03-20 NOTE — ED NOTES
Pt urinated on bed; bath and baby wipes,cleaned linens and scrubs provided. Pt lunch tray ordered.     Debra Jorgensen  03/20/24 1140

## 2024-03-20 NOTE — ED NOTES
Patient out to nurses station to state that she is still having diarrhea. Patient states that the lomotil did not work.      Yoon Christianson RN  03/20/24 9151
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
## 2024-03-20 NOTE — ED NOTES
Patient agrees to shower at this time. Toiletries, towels, and scrubs provided.      Debra Jorgensen  03/20/24 0928

## 2024-03-21 PROBLEM — F31.63 BIPOLAR 1 DISORDER, MIXED, SEVERE (HCC): Status: ACTIVE | Noted: 2024-03-21

## 2024-03-21 LAB
25(OH)D3 SERPL-MCNC: 34 NG/ML (ref 30–100)
ALBUMIN SERPL BCP-MCNC: 3.4 G/DL (ref 3.5–5)
ALP SERPL-CCNC: 58 U/L (ref 34–104)
ALT SERPL W P-5'-P-CCNC: 14 U/L (ref 7–52)
ANION GAP SERPL CALCULATED.3IONS-SCNC: 9 MMOL/L (ref 4–13)
AST SERPL W P-5'-P-CCNC: 13 U/L (ref 13–39)
BASOPHILS # BLD AUTO: 0.03 THOUSANDS/ÂΜL (ref 0–0.1)
BASOPHILS NFR BLD AUTO: 1 % (ref 0–1)
BILIRUB SERPL-MCNC: 0.22 MG/DL (ref 0.2–1)
BUN SERPL-MCNC: 13 MG/DL (ref 5–25)
CALCIUM ALBUM COR SERPL-MCNC: 9.4 MG/DL (ref 8.3–10.1)
CALCIUM SERPL-MCNC: 8.9 MG/DL (ref 8.4–10.2)
CHLORIDE SERPL-SCNC: 102 MMOL/L (ref 96–108)
CHOLEST SERPL-MCNC: 143 MG/DL
CO2 SERPL-SCNC: 29 MMOL/L (ref 21–32)
CREAT SERPL-MCNC: 0.52 MG/DL (ref 0.6–1.3)
EOSINOPHIL # BLD AUTO: 0.04 THOUSAND/ÂΜL (ref 0–0.61)
EOSINOPHIL NFR BLD AUTO: 1 % (ref 0–6)
ERYTHROCYTE [DISTWIDTH] IN BLOOD BY AUTOMATED COUNT: 12.4 % (ref 11.6–15.1)
FOLATE SERPL-MCNC: 11.4 NG/ML
GFR SERPL CREATININE-BSD FRML MDRD: 116 ML/MIN/1.73SQ M
GLUCOSE P FAST SERPL-MCNC: 89 MG/DL (ref 65–99)
GLUCOSE SERPL-MCNC: 89 MG/DL (ref 65–140)
HCT VFR BLD AUTO: 37.4 % (ref 34.8–46.1)
HDLC SERPL-MCNC: 46 MG/DL
HGB BLD-MCNC: 12.2 G/DL (ref 11.5–15.4)
IMM GRANULOCYTES # BLD AUTO: 0.02 THOUSAND/UL (ref 0–0.2)
IMM GRANULOCYTES NFR BLD AUTO: 0 % (ref 0–2)
LDLC SERPL CALC-MCNC: 71 MG/DL (ref 0–100)
LYMPHOCYTES # BLD AUTO: 3.01 THOUSANDS/ÂΜL (ref 0.6–4.47)
LYMPHOCYTES NFR BLD AUTO: 49 % (ref 14–44)
MCH RBC QN AUTO: 29.1 PG (ref 26.8–34.3)
MCHC RBC AUTO-ENTMCNC: 32.6 G/DL (ref 31.4–37.4)
MCV RBC AUTO: 89 FL (ref 82–98)
MONOCYTES # BLD AUTO: 0.46 THOUSAND/ÂΜL (ref 0.17–1.22)
MONOCYTES NFR BLD AUTO: 8 % (ref 4–12)
NEUTROPHILS # BLD AUTO: 2.45 THOUSANDS/ÂΜL (ref 1.85–7.62)
NEUTS SEG NFR BLD AUTO: 41 % (ref 43–75)
NONHDLC SERPL-MCNC: 97 MG/DL
NRBC BLD AUTO-RTO: 0 /100 WBCS
PLATELET # BLD AUTO: 193 THOUSANDS/UL (ref 149–390)
PMV BLD AUTO: 11.5 FL (ref 8.9–12.7)
POTASSIUM SERPL-SCNC: 4.1 MMOL/L (ref 3.5–5.3)
PROT SERPL-MCNC: 5.3 G/DL (ref 6.4–8.4)
RBC # BLD AUTO: 4.19 MILLION/UL (ref 3.81–5.12)
SODIUM SERPL-SCNC: 140 MMOL/L (ref 135–147)
T4 FREE SERPL-MCNC: 0.95 NG/DL (ref 0.61–1.12)
TRIGL SERPL-MCNC: 131 MG/DL
TSH SERPL DL<=0.05 MIU/L-ACNC: <0.01 UIU/ML (ref 0.45–4.5)
VALPROATE SERPL-MCNC: 33 UG/ML (ref 50–100)
VIT B12 SERPL-MCNC: 723 PG/ML (ref 180–914)
WBC # BLD AUTO: 6.01 THOUSAND/UL (ref 4.31–10.16)

## 2024-03-21 PROCEDURE — 80053 COMPREHEN METABOLIC PANEL: CPT | Performed by: NURSE PRACTITIONER

## 2024-03-21 PROCEDURE — 82607 VITAMIN B-12: CPT

## 2024-03-21 PROCEDURE — 84439 ASSAY OF FREE THYROXINE: CPT | Performed by: NURSE PRACTITIONER

## 2024-03-21 PROCEDURE — 93005 ELECTROCARDIOGRAM TRACING: CPT

## 2024-03-21 PROCEDURE — 83036 HEMOGLOBIN GLYCOSYLATED A1C: CPT | Performed by: NURSE PRACTITIONER

## 2024-03-21 PROCEDURE — 82746 ASSAY OF FOLIC ACID SERUM: CPT

## 2024-03-21 PROCEDURE — 84443 ASSAY THYROID STIM HORMONE: CPT | Performed by: NURSE PRACTITIONER

## 2024-03-21 PROCEDURE — 80061 LIPID PANEL: CPT | Performed by: NURSE PRACTITIONER

## 2024-03-21 PROCEDURE — 85025 COMPLETE CBC W/AUTO DIFF WBC: CPT | Performed by: NURSE PRACTITIONER

## 2024-03-21 PROCEDURE — 80164 ASSAY DIPROPYLACETIC ACD TOT: CPT | Performed by: NURSE PRACTITIONER

## 2024-03-21 PROCEDURE — 99223 1ST HOSP IP/OBS HIGH 75: CPT | Performed by: PSYCHIATRY & NEUROLOGY

## 2024-03-21 PROCEDURE — 82306 VITAMIN D 25 HYDROXY: CPT

## 2024-03-21 RX ORDER — LEVOTHYROXINE SODIUM 0.1 MG/1
200 TABLET ORAL
Status: DISCONTINUED | OUTPATIENT
Start: 2024-03-22 | End: 2024-04-01 | Stop reason: HOSPADM

## 2024-03-21 RX ORDER — DIVALPROEX SODIUM 500 MG/1
1500 TABLET, EXTENDED RELEASE ORAL
Status: DISCONTINUED | OUTPATIENT
Start: 2024-03-21 | End: 2024-03-26

## 2024-03-21 RX ORDER — ARIPIPRAZOLE 15 MG/1
15 TABLET ORAL DAILY
Status: DISCONTINUED | OUTPATIENT
Start: 2024-03-21 | End: 2024-04-01 | Stop reason: HOSPADM

## 2024-03-21 RX ADMIN — ARIPIPRAZOLE 15 MG: 15 TABLET ORAL at 14:06

## 2024-03-21 RX ADMIN — DIVALPROEX SODIUM 1500 MG: 500 TABLET, FILM COATED, EXTENDED RELEASE ORAL at 21:57

## 2024-03-21 RX ADMIN — LEVOTHYROXINE SODIUM 224 MCG: 112 TABLET ORAL at 05:24

## 2024-03-21 RX ADMIN — CLONAZEPAM 0.5 MG: 0.5 TABLET ORAL at 09:04

## 2024-03-21 RX ADMIN — DEXTRAN 70, GLYCERIN, HYPROMELLOSE 1 DROP: 1; 2; 3 SOLUTION/ DROPS OPHTHALMIC at 21:58

## 2024-03-21 RX ADMIN — MUPIROCIN 1 APPLICATION: 20 OINTMENT TOPICAL at 09:16

## 2024-03-21 RX ADMIN — Medication 2000 UNITS: at 09:03

## 2024-03-21 RX ADMIN — OMEGA-3 FATTY ACIDS CAP 1000 MG 1000 MG: 1000 CAP at 17:17

## 2024-03-21 RX ADMIN — DOCUSATE SODIUM 100 MG: 100 CAPSULE, LIQUID FILLED ORAL at 09:04

## 2024-03-21 RX ADMIN — MUPIROCIN 1 APPLICATION: 20 OINTMENT TOPICAL at 17:23

## 2024-03-21 RX ADMIN — Medication 1 TABLET: at 09:14

## 2024-03-21 RX ADMIN — OMEGA-3 FATTY ACIDS CAP 1000 MG 1000 MG: 1000 CAP at 21:58

## 2024-03-21 RX ADMIN — OMEGA-3 FATTY ACIDS CAP 1000 MG 1000 MG: 1000 CAP at 09:03

## 2024-03-21 RX ADMIN — FUROSEMIDE 20 MG: 40 TABLET ORAL at 09:04

## 2024-03-21 RX ADMIN — Medication 400 MG: at 09:04

## 2024-03-21 RX ADMIN — CYANOCOBALAMIN TAB 500 MCG 1000 MCG: 500 TAB at 09:04

## 2024-03-21 RX ADMIN — Medication 1 TABLET: at 09:03

## 2024-03-21 RX ADMIN — LIDOCAINE 5% 1 PATCH: 700 PATCH TOPICAL at 18:17

## 2024-03-21 RX ADMIN — SERTRALINE HYDROCHLORIDE 125 MG: 25 TABLET ORAL at 14:02

## 2024-03-21 RX ADMIN — HYDROCORTISONE 15 MG: 10 TABLET ORAL at 09:08

## 2024-03-21 RX ADMIN — TRIAMCINOLONE ACETONIDE: 1 CREAM TOPICAL at 09:07

## 2024-03-21 RX ADMIN — Medication 400 MG: at 17:24

## 2024-03-21 RX ADMIN — CLONAZEPAM 0.5 MG: 0.5 TABLET ORAL at 17:24

## 2024-03-21 RX ADMIN — Medication 1 TABLET: at 17:17
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
## 2024-03-21 NOTE — SOCIAL WORK
SHANEKA placed call:    Guerline (Assistant Clinical Director/contact)  Ph: [phone]    SHANEKA left  requesting guardianship paperwork be faxed or emailed to SHANEKA.
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
## 2024-03-21 NOTE — NUTRITION
03/21/24 1214   Biochemical Data,Medical Tests, and Procedures   Biochemical Data/Medical Tests/Procedures Lab values reviewed;Meds reviewed   Labs (Comment) 3/21/24 Creatinine 0.52, Total Protein 5.3, Albumin 3.4, TSH < 0.010, HDL 46   Meds (Comment) vitmain D, klonopin, vitamin B12, colace, fish oil, lasix, cortef, levothyroxine, magnesium oxide, centrum   Nutrition-Focused Physical Exam   Nutrition-Focused Physical Exam Findings RN skin assessment reviewed;No edema documented;Wound  (Wound left anterior throat.)   Medical-Related Concerns Hypothyroidism, obesity, and dyslipidemia.   Adequacy of Intake   Nutrition Modality PO   Feeding Route   PO Independent   Current PO Intake   Current Diet Order Regular diet, thin liquids. Finger foods.   Current Meal Intake %;25-50%   Estimated calorie intake compared to estimated need Nutrient needs are met.   PES Statement   Problem Clinical   Weight (3) Overweight/obesity NC-3.3   Related to Energy intake>energy output over time   As evidenced by: BMI   Recommendations/Interventions   Malnutrition/BMI Present Yes  (energy intake > energy output over time.)   Adult BMI Classifications Morbid Obesity 40-44.9   Summary High BMI. PMH significant for mood disorder, hypothyroidism, obesity, and dyslipidemia. 3/20/24 279# BMI 42.42 12/8/23 232# (20% body weight, 47# gain in 3 months) 6/8/23 283# 4/23/23. Weight fluctuations present, weight gain is significant. No edema. Wound left anterior throat. Regular diet, thin liquids. Finger foods. Meal completions %. Met with patient. Reports a fair appetite. Patient presents from a group home. Reports she is unsure of how many meals she eats per day. Per notes group home staff reports she eats 3-4 meals/day. Denies dysphagia. Patient offers no nutrition related questions. Will continue to monitor.   Interventions/Recommendations Continue current diet order   Education Assessment   Education Patient/caregiver not appropriate  for education at this time   Patient Nutrition Goals   Goal Avoid weight gain   Goal Status Initiated   Timeframe to complete goal by next f/u   Nutrition Complexity Risk   Nutrition complexity level Low risk   Follow up date 04/02/24

## 2024-03-21 NOTE — PLAN OF CARE
Problem: Alteration in Thoughts and Perception  Goal: Agree to be compliant with medication regime, as prescribed and report medication side effects  Description: Interventions:  - Offer appropriate PRN medication and supervise ingestion; conduct AIMS, as needed   Outcome: Progressing

## 2024-03-21 NOTE — SOCIAL WORK
SHANEKA prepared 303 petition. Provider completed. SHANEKA emailed 302, 303 petitions to [email or web address] at [email or web address] to schedule hearing. Placed call to mhds at [phone] ext [phone] and Hansa confirmed receipt of petitions. Hansa to schedule Friday 3/22 hearing after 10:30 a.m.

## 2024-03-21 NOTE — H&P
Yaritza Dunne  :1980 F  MRN:[id number]    CSN:[id number]  Adm Date: 3/20/2024 1528  3:28 PM   ATT PHY: Corazon De Leon Md  [facility]         Chief Complaint: suicide attempt      History of Presenting Illness: Yaritza Dunne is a(n) 43 y.o. year old female who is admitted to [facility] on  involuntary 302 commitment basis.  Patient originally presented to [facility] ED on 3/19/2024 after a suicide attempt by using shard of broken glass to cut her neck.  This did not require surgical intervention in the ED.     Patient examined at bedside.  Patient complaining of dry eyes and requesting eye drops.  She also reports left wrist and ankle pain.  Had x-ray in he ED showing no acute abnormality.  Patient otherwise denies any physical complaints at this time.     Allergies   Allergen Reactions    Haldol [Haloperidol] Other (See Comments)     dyskenesia    Thioridazine      Current Facility-Administered Medications on File Prior to Encounter   Medication Dose Route Frequency Provider Last Rate Last Admin    [DISCONTINUED] acetaminophen (TYLENOL) tablet 650 mg  650 mg Oral Q4H PRN Marvin Lobo MD   650 mg at 24 0454    [DISCONTINUED] ARIPiprazole (ABILIFY) tablet 15 mg  15 mg Oral Daily Marvin Lobo MD   15 mg at 24 0855    [DISCONTINUED] calcium carbonate-vitamin D 500 mg-5 mcg tablet 1 tablet  1 tablet Oral BID With Meals Marvin Lobo MD   1 tablet at 24 0743    [DISCONTINUED] clonazePAM (KlonoPIN) tablet 0.5 mg  0.5 mg Oral BID Marvin Lobo MD   0.5 mg at 24 0855    [DISCONTINUED] divalproex sodium (DEPAKOTE ER) 24 hr tablet 1,250 mg  1,250 mg Oral HS Marvin Lobo MD   1,250 mg at 24 0239    [DISCONTINUED] furosemide (LASIX) tablet 20 mg  20 mg Oral Daily Marvin Lobo MD   20 mg at 24 0857    [DISCONTINUED] hydrocortisone (CORTEF) tablet 15 mg  15 mg Oral Daily With Breakfast  Marvin Lobo MD   15 mg at 03/20/24 0855    [DISCONTINUED] hydrocortisone (CORTEF) tablet 5 mg  5 mg Oral Daily Marvin Lobo MD        [DISCONTINUED] levothyroxine tablet 224 mcg  224 mcg Oral Once per day on Monday Tuesday Wednesday Thursday Friday Marvin Lobo MD   224 mcg at 03/20/24 0548    [DISCONTINUED] levothyroxine tablet 336 mcg  336 mcg Oral Once per day on Sunday Saturday Marvin Lobo MD        [DISCONTINUED] meclizine (ANTIVERT) tablet 25 mg  25 mg Oral Q12H PRN Marvin Lobo MD   25 mg at 03/20/24 0240    [DISCONTINUED] sertraline (ZOLOFT) tablet 150 mg  150 mg Oral Daily Marvin Lobo MD   150 mg at 03/20/24 0744     Current Outpatient Medications on File Prior to Encounter   Medication Sig Dispense Refill    acetaminophen (TYLENOL) 325 mg tablet Take 650 mg by mouth every 4 (four) hours as needed for mild pain      albuterol (PROVENTIL HFA,VENTOLIN HFA) 90 mcg/act inhaler Inhale 2 puffs every 6 (six) hours as needed for wheezing      ARIPiprazole (ABILIFY) 15 mg tablet Take 1 tablet (15 mg total) by mouth daily 30 tablet 0    Calcium Carb-Cholecalciferol (calcium carbonate-vitamin D) 500 mg-5 mcg tablet Take 1 tablet by mouth 2 (two) times a day with meals 60 tablet 0    Cholecalciferol (VITAMIN D) 50 MCG (2000 UT) tablet Take 2,000 Units by mouth daily      clonazePAM (KlonoPIN) 0.5 mg tablet Take 1 tablet (0.5 mg total) by mouth 2 (two) times a day 60 tablet 0    cyanocobalamin (VITAMIN B-12) 1000 MCG tablet Take 1 tablet (1,000 mcg total) by mouth daily 30 tablet 0    divalproex sodium (DEPAKOTE ER) 250 mg 24 hr tablet Take 5 tablets (1,250 mg total) by mouth daily at bedtime 150 tablet 0    docusate sodium (COLACE) 100 mg capsule Take 100 mg by mouth daily      furosemide (LASIX) 20 mg tablet Take 20 mg by mouth daily      HYDROCORTISONE PO Take 15 mg by mouth daily with breakfast      ibuprofen (MOTRIN) 200 mg tablet Take 200 mg by mouth every 6 (six) hours as needed for mild pain       levothyroxine 112 mcg tablet Take by mouth Take 3 tablets by mouth twice a week on Saturday and Sunday.      levothyroxine 112 mcg tablet Take by mouth daily Take 2 tablets by mouth Monday - Friday.      lidocaine (Lidoderm) 5 % Apply 1 patch topically over 12 hours daily as needed (pain) Remove & Discard patch within 12 hours or as directed by MD 30 patch 2    magnesium Oxide (MAG-OX) 400 mg TABS Take 1 tablet (400 mg total) by mouth 2 (two) times a day 60 tablet 0    meclizine (ANTIVERT) 25 mg tablet Take 25 mg by mouth every 12 (twelve) hours as needed for dizziness      meloxicam (MOBIC) 15 mg tablet Take 1 tablet (15 mg total) by mouth daily as needed for moderate pain 30 tablet 2    Multiple Vitamin (MULTIVITAMIN) tablet Take 1 tablet by mouth daily      mupirocin (BACTROBAN) 2 % ointment Apply 1 application topically 3 (three) times a day      Omega-3 Fatty Acids (FISH OIL) 1,000 mg Take 1,000 mg by mouth 3 (three) times a day      Refresh Tears 0.5 % SOLN       saccharomyces boulardii (FLORASTOR) 250 mg capsule Take 1 capsule (250 mg total) by mouth 2 (two) times a day 60 capsule 0    sertraline (ZOLOFT) 50 mg tablet Take 3 tablets (150 mg total) by mouth daily 90 tablet 0    triamcinolone (KENALOG) 0.1 % cream Apply topically 2 (two) times a day as needed (ezcema)      Calcium Carb-Cholecalciferol (CALCIUM 600-D PO)  (Patient not taking: Reported on 3/20/2024)      hydrocortisone (CORTEF) 5 mg tablet Take 1 tablet (5 mg total) by mouth daily (Patient not taking: Reported on 3/19/2024) 30 tablet 0       Active Ambulatory Problems     Diagnosis Date Noted    Lumbar radiculopathy     Salter-Hendricks Type IV fracture of lower end of left tibia 08/05/2014    Closed pilon fracture of left tibia 08/05/2014    Intellectual disability 02/23/2024    Unspecified mood (affective) disorder (HCC) 02/23/2024    Intermittent explosive disorder 02/23/2024    Epilepsy (HCC) 02/23/2024    Adrenocortical insufficiency (HCC)  02/23/2024    Hypothyroidism 02/23/2024    Obesity 02/23/2024    Dyslipidemia 02/23/2024    Edema 02/23/2024    Open toe wound 02/23/2024    T wave inversion in EKG 02/23/2024     Resolved Ambulatory Problems     Diagnosis Date Noted    Medical clearance for psychiatric admission 02/23/2024     Past Medical History:   Diagnosis Date    Bipolar disorder (HCC)     CVA (cerebral vascular accident) (HCC)     Disease of thyroid gland     Disorder of bone density and structure, unspecified     Hyperlipidemia     Hypopituitarism (HCC)     Mild intellectual disabilities     Psychiatric disorder        Past Surgical History:   Procedure Laterality Date    EPIDURAL BLOCK INJECTION N/A 8/23/2023    Procedure: L3-L4  LUMBAR epidural steroid injection (74362);  Surgeon: Adam Hayden DO;  Location: [facility] MAIN OR;  Service: Pain Management        Social History:   Social History     Socioeconomic History    Marital status: Single     Spouse name: None    Number of children: None    Years of education: None    Highest education level: None   Occupational History    None   Tobacco Use    Smoking status: Never    Smokeless tobacco: Never   Vaping Use    Vaping status: Never Used   Substance and Sexual Activity    Alcohol use: Not Currently    Drug use: Not Currently    Sexual activity: Not Currently   Other Topics Concern    None   Social History Narrative    None     Social Determinants of Health     Financial Resource Strain: Not on file   Food Insecurity: Not on file   Transportation Needs: Not on file   Physical Activity: Not on file   Stress: Not on file   Social Connections: Not on file   Intimate Partner Violence: Not on file   Housing Stability: Not on file     Family History: History reviewed. No pertinent family history.    Review of Systems   Constitutional:  Negative for chills and fever.   HENT:  Negative for ear pain and sore throat.    Eyes:  Negative for pain and visual disturbance.        Dry eyes  "  Respiratory:  Negative for cough and shortness of breath.    Cardiovascular:  Negative for chest pain and palpitations.   Gastrointestinal:  Negative for abdominal pain, constipation, diarrhea, nausea and vomiting.   Genitourinary:  Negative for difficulty urinating, dysuria and frequency.   Musculoskeletal:  Positive for arthralgias.   Skin:  Negative for rash.   Neurological:  Negative for seizures and syncope.   Psychiatric/Behavioral:  Positive for dysphoric mood, self-injury and suicidal ideas. The patient is nervous/anxious.    All other systems reviewed and are negative.    Physical Exam   Vitals: Blood pressure 105/73, pulse 73, temperature 97.5 °F (36.4 °C), temperature source Temporal, resp. rate 18, height 5' 8\" (1.727 m), weight 127 kg (279 lb), SpO2 97%.,Body mass index is 42.42 kg/m².  Constitutional: Awake, alert, in no acute distress.  Head: Normocephalic and atraumatic.   Mouth/Throat: Oropharynx is clear and moist.    Eyes: Conjunctivae and EOM are normal.   Neck: Neck supple.    Cardiovascular: Normal rate, regular rhythm.  Pulmonary/Chest: Effort normal and breath sounds normal.   Abdominal: Soft. Bowel sounds are normal. There is no tenderness. There is no rebound and no guarding.   Neurological: No focal deficits.   Skin: Skin is warm and dry.     Assessment     Yaritza Dunne is a(n) 43 y.o. female with bipolar disorder.     Hypothyroidism.  Home:  levothyroxine 224 mcg M-F and 336 mcg on S/Sn.  TSH level <0.010, FT4 0.95 on 3/21/24.  Decrease levothyroxine 224 to 200 mcg daily.  Repeat TSH in 1 week on 3/28/24.  Adrenocortical insufficiency.  Patient is on hydrocortisone 15 mg daily.  Epilepsy.  Continue Depakote nightly.  Dyslipidemia.  Patient is on fish oil 1 mg three times daily.  Lower extremity edema.  Continue lasix 20 mg daily.  Hypomagnesemia.  Continue mag oxide 400 mg twice daily.   Vitamin D deficiency.  Patient is on vitamin D3 2000 units daily.  Vitamin B12 deficiency.  Patient " is on vitamin B12 1000 mcg daily.  Intellectual disability.  Supportive therapy.  Left anterior neck abrasions.  Self inflicted.  Apply Bactroban three times daily.  Left wrist and ankle pain.  Had x-rays 3/20/24.  Left wrist x-ray showing no fracture or malalignment but remains skeletally immature.  Left ankle x-ray with no acute abnormality but has chronic deformity of ankle and foot as described with arthritic changes at the tibiotalar joint and prominent swelling.  Tylenol and lidocaine patch as needed.  Apply ice as needed.  Dry eyes.  Start artifical tear drops twice daily.  Psych with bipolar disorder.  This is being managed by the psych team.      Prognosis: Fair.    Discharge Plan: In progress.    Advanced Directives: I have discussed in detail with the patient the advanced directives. The patient's legal guardian/emergency contact is her mother, Kiley Dunne, whose number is [phone].  Patient does not have a living will. When discussing cardiac and pulmonary resuscitation efforts with the patient, the patient wishes to be full code.    I have spent more than 50 minutes gathering data, doing physical examination, and discussing the advanced directives, which was witnessed by caring staff.    The patient was discussed with Dr. Arguelles and he is in agreement with the above note.

## 2024-03-21 NOTE — SOCIAL WORK
"SHANEKA met with pt in group room. Obtained GRAHAM's:    [facility] (group home facility)  [address] [place] [address]  Ph:  [phone] ([facility] - )  Guerline (Assistant Clinical Director/contact)  Ph: [phone]  Fax: [phone]    Dr. Randy Horowitz (psych)  [address]  [place] [address]  Ph: [phone]  Fax: [phone]    Dr. Luis Felipe Hankins (pcp)  [facility] [address]  [facility] [place] [address]  Ph: [phone]    Kiley Dunne (contact/mother/guardian)  [place]  Ph: [phone]    SHANEKA placed call to [facility], to discuss pt care. Guerline said she has known pt very many years. Said pt has improved \"over the years.\" Said bx's improved and pt is less impulsive. Said due to stroke at 4 yrs old, self-regulating is challenging. In home with 8 females. Said she wants attention and to be included and is not accepted all of the time. Said pt got a 'crush' on a roommate and told the girl and pt was disappointed when she told pt that she did not want to be her girlfriend. Said after recent admission at [facility], pt showed improvement, then a few days ago, pt started to show bx's. Said pt functions at 10-12 yr old level. Guerline provided information for pt's psychosocial assessment. SHANEKA made Guerline aware of pt's 303 hearing tomorrow morning; Guerline unable to attend.    SHANEKA placed call to pt's psych provider. SHANEKA left vm on nursing extension with pt info informing of pt admission.    SHANEKA placed call to pt's pcp. Number not in service.    SHANEKA placed call to pt's mother/guardian. SHANEKA provided nurses' station and Sws' numbers. Mother stated she would like to be present for hearing tomorrow; SW to send hearing information to email: [email or web address] Said pt has hypopituitarism. Mother said she was planning to visit pt Monday 3/25 to Monday 4/1. Does not tell pt when she is coming, as staff recommended to reduce pt's anxiety and bx's.        "

## 2024-03-21 NOTE — MALNUTRITION/BMI
This medical record reflects one or more clinical indicators suggestive of malnutrition and/or morbid obesity.    BMI Findings:  Adult BMI Classifications: Morbid Obesity 40-44.9     Energy intake > energy output over time.     Body mass index is 42.42 kg/m².     See Nutrition note dated 3/21/2024 for additional details.  Completed nutrition assessment is viewable in the nutrition documentation.

## 2024-03-21 NOTE — PROGRESS NOTES
03/21/24 1459   Housing Stability   In the last 12 months, was there a time when you were not able to pay the mortgage or rent on time? N   In the last 12 months, how many places have you lived? 1   In the last 12 months, was there a time when you did not have a steady place to sleep or slept in a shelter (including now)? N   Transportation Needs   In the past 12 months, has lack of transportation kept you from medical appointments or from getting medications? no   In the past 12 months, has lack of transportation kept you from meetings, work, or from getting things needed for daily living? No   Food Insecurity   Within the past 12 months, you worried that your food would run out before you got the money to buy more. Never true   Within the past 12 months, the food you bought just didn't last and you didn't have money to get more. Never true   Intimate Partner Violence   Within the last year, have you been afraid of your partner or ex-partner? No   Within the last year, have you been humiliated or emotionally abused in other ways by your partner or ex-partner? No   Within the last year, have you been kicked, hit, slapped, or otherwise physically hurt by your partner or ex-partner? No   Within the last year, have you been raped or forced to have any kind of sexual activity by your partner or ex-partner? No   Utilities   In the past 12 months has the electric, gas, oil, or water company threatened to shut off services in your home? No

## 2024-03-21 NOTE — PROGRESS NOTES
03/21/24   Team Meeting   Meeting Type Tx Team Meeting   Team Members Present   Team Members Present Physician;Nurse;   Physician Team Member Dr. Germán CORONADO   Nursing Team Member Sanna Garner RN   Social Work Team Member Myrna Lopez GENA   Patient/Family Present   Patient Present Yes   Patient's Family Present No     Treatment team met with pt to review care plan and goals. All in agreement with treatment plan; all signed. Original to be sent to be scanned to pt chart; copy to pt chart.

## 2024-03-22 LAB
AMMONIA PLAS-SCNC: 63 UMOL/L (ref 18–72)
ATRIAL RATE: 70 BPM
EST. AVERAGE GLUCOSE BLD GHB EST-MCNC: 126 MG/DL
HBA1C MFR BLD: 6 %
P AXIS: 10 DEGREES
PR INTERVAL: 164 MS
QRS AXIS: 24 DEGREES
QRSD INTERVAL: 112 MS
QT INTERVAL: 416 MS
QTC INTERVAL: 449 MS
T WAVE AXIS: 60 DEGREES
VALPROATE SERPL-MCNC: 55 UG/ML (ref 50–100)
VENTRICULAR RATE: 70 BPM

## 2024-03-22 PROCEDURE — 80164 ASSAY DIPROPYLACETIC ACD TOT: CPT | Performed by: PSYCHIATRY & NEUROLOGY

## 2024-03-22 PROCEDURE — 93010 ELECTROCARDIOGRAM REPORT: CPT | Performed by: INTERNAL MEDICINE

## 2024-03-22 PROCEDURE — 99232 SBSQ HOSP IP/OBS MODERATE 35: CPT

## 2024-03-22 PROCEDURE — 82140 ASSAY OF AMMONIA: CPT | Performed by: PSYCHIATRY & NEUROLOGY

## 2024-03-22 RX ADMIN — DIVALPROEX SODIUM 1500 MG: 500 TABLET, FILM COATED, EXTENDED RELEASE ORAL at 20:35

## 2024-03-22 RX ADMIN — DOCUSATE SODIUM 100 MG: 100 CAPSULE, LIQUID FILLED ORAL at 08:51

## 2024-03-22 RX ADMIN — Medication 1 TABLET: at 17:26

## 2024-03-22 RX ADMIN — B-COMPLEX W/ C & FOLIC ACID TAB 1 TABLET: TAB at 08:51

## 2024-03-22 RX ADMIN — LIDOCAINE 5% 1 PATCH: 700 PATCH TOPICAL at 17:27

## 2024-03-22 RX ADMIN — DEXTRAN 70, GLYCERIN, HYPROMELLOSE 1 DROP: 1; 2; 3 SOLUTION/ DROPS OPHTHALMIC at 20:36

## 2024-03-22 RX ADMIN — ACETAMINOPHEN 650 MG: 325 TABLET ORAL at 03:11

## 2024-03-22 RX ADMIN — ACETAMINOPHEN 650 MG: 325 TABLET ORAL at 23:35

## 2024-03-22 RX ADMIN — LEVOTHYROXINE SODIUM 200 MCG: 0.1 TABLET ORAL at 05:14

## 2024-03-22 RX ADMIN — MUPIROCIN 1 APPLICATION: 20 OINTMENT TOPICAL at 17:30

## 2024-03-22 RX ADMIN — Medication 400 MG: at 17:26

## 2024-03-22 RX ADMIN — Medication 1 TABLET: at 06:03

## 2024-03-22 RX ADMIN — Medication 400 MG: at 08:51

## 2024-03-22 RX ADMIN — CLONAZEPAM 0.5 MG: 0.5 TABLET ORAL at 08:51

## 2024-03-22 RX ADMIN — HYDROCORTISONE 15 MG: 10 TABLET ORAL at 08:51

## 2024-03-22 RX ADMIN — SERTRALINE HYDROCHLORIDE 125 MG: 25 TABLET ORAL at 08:51

## 2024-03-22 RX ADMIN — OMEGA-3 FATTY ACIDS CAP 1000 MG 1000 MG: 1000 CAP at 20:36

## 2024-03-22 RX ADMIN — DEXTRAN 70, GLYCERIN, HYPROMELLOSE 1 DROP: 1; 2; 3 SOLUTION/ DROPS OPHTHALMIC at 11:58

## 2024-03-22 RX ADMIN — OMEGA-3 FATTY ACIDS CAP 1000 MG 1000 MG: 1000 CAP at 08:51

## 2024-03-22 RX ADMIN — MUPIROCIN 1 APPLICATION: 20 OINTMENT TOPICAL at 08:55

## 2024-03-22 RX ADMIN — Medication 2000 UNITS: at 08:51

## 2024-03-22 RX ADMIN — OMEGA-3 FATTY ACIDS CAP 1000 MG 1000 MG: 1000 CAP at 17:26

## 2024-03-22 RX ADMIN — ARIPIPRAZOLE 15 MG: 15 TABLET ORAL at 08:51

## 2024-03-22 RX ADMIN — CLONAZEPAM 0.5 MG: 0.5 TABLET ORAL at 17:26

## 2024-03-22 RX ADMIN — CYANOCOBALAMIN TAB 500 MCG 1000 MCG: 500 TAB at 08:51

## 2024-03-22 NOTE — PROGRESS NOTES
03/22/24   Team Meeting   Meeting Type Daily Rounds   Team Members Present   Team Members Present Physician;Nurse;Occupational Therapist;   Physician Team Member Dr Germán CORONADO; Yeison BURR   Nursing Team Member Briseida MATTHEWS   Social Work Team Member Raymond LEON   OT Team Member    Patient/Family Present   Patient Present No   Patient's Family Present No     303 today 11am; easily agitated, poor sleep, poor insight, tearful, med comp, labile mood, low frustration tolerance, increased depakote, lowered zoloft; no dc date.

## 2024-03-22 NOTE — PROGRESS NOTES
"Progress Note - Behavioral Health   Yaritza Dunne 43 y.o. female MRN: [id number]  Unit/Bed#: [facility] 256-02 Encounter: [id number]    Assessment/Plan   Principal Problem:    Bipolar 1 disorder, mixed, severe (HCC)      Behavior over the last 24 hours:  unchanged  Sleep: poor  Appetite: normal  Medication side effects: No  ROS: no complaints and all other systems are negative    Subjective: Yaritza was seen today for psychiatric follow-up. On assessment patient is visible on the unit, social with peers. She appeared irritable during interview, patient is ruminative on her recent court hearing, per pt.\"I cannot stay here for 20 more days this week [birth year]; and I need to be discharged.\"  She continues to exhibit poor insight into her current hospitalization.  Patient reassured.  According to nursing staff report patient is compliant with the current occasion regimen.  She denied SI/HI/AVH, she did not appear internally preoccupied.    Mental Status Evaluation:  Appearance:  age appropriate, disheveled, and overweight   Behavior:  cooperative   Speech:  normal pitch and normal volume   Mood:  irritable and labile   Affect:  constricted   Thought Process:  circumstantial   Associations: circumstantial associations   Thought Content:  ruminations   Perceptual Disturbances: Denied AVH, did not appear internally preoccupied   Risk Potential: Suicidal Ideations none at present  Homicidal Ideations none at present  Potential for Aggression No   Sensorium:  person, place, and time/date   Memory:  recent and remote memory grossly intact   Consciousness:  alert and awake    Attention: attention span appeared shorter than expected for age   Insight:  poor   Judgment: poor   Gait/Station: normal gait/station   Motor Activity: no abnormal movements     Progress Toward Goals: Unchanged. Patient appears to have an irritable edge, and has poor insight and judgement.  She is compliant with the current psychotropic medication " regimen.  She denies side effects on current psychotropic medication regimen.  Will continue current psychotropic medication regimen.  No decided at this time.    Recommended Treatment: Continue with group therapy, milieu therapy and occupational therapy.      Risks, benefits and possible side effects of Medications:   Risks, benefits, and possible side effects of medications explained to patient and patient verbalizes understanding.      Medications: all current active meds have been reviewed.    Labs: I have personally reviewed all pertinent laboratory/tests results. Most Recent Labs:   Lab Results   Component Value Date    WBC 6.01 03/21/2024    RBC 4.19 03/21/2024    HGB 12.2 03/21/2024    HCT 37.4 03/21/2024     03/21/2024    RDW 12.4 03/21/2024    NEUTROABS 2.45 03/21/2024    SODIUM 140 03/21/2024    K 4.1 03/21/2024     03/21/2024    CO2 29 03/21/2024    BUN 13 03/21/2024    CREATININE 0.52 (L) 03/21/2024    GLUC 89 03/21/2024    GLUF 89 03/21/2024    CALCIUM 8.9 03/21/2024    AST 13 03/21/2024    ALT 14 03/21/2024    ALKPHOS 58 03/21/2024    TP 5.3 (L) 03/21/2024    ALB 3.4 (L) 03/21/2024    TBILI 0.22 03/21/2024    CHOLESTEROL 143 03/21/2024    HDL 46 (L) 03/21/2024    TRIG 131 03/21/2024    LDLCALC 71 03/21/2024    NONHDLC 97 03/21/2024    VALPROICTOT 55 03/22/2024    AMMONIA 63 03/22/2024    PUS6PHOOHIDE <0.010 (L) 03/21/2024    FREET4 0.95 03/21/2024    HGBA1C 6.0 (H) 03/21/2024     03/21/2024       Counseling / Coordination of Care  Total floor / unit time spent today 25 minutes.

## 2024-03-22 NOTE — PROGRESS NOTES
"Progress Note - Yaritza Dunne 43 y.o. female MRN: [id number]    Unit/Bed#: [facility] 256-02 Encounter: [id number]        Subjective:   Patient seen and examined at bedside after reviewing the chart and discussing the case with the caring staff.      Patient examined at bedside.  Patient has no acute symptoms.    Physical Exam   Vitals: Blood pressure 108/76, pulse 55, temperature (!) 97.3 °F (36.3 °C), temperature source Temporal, resp. rate 18, height 5' 8\" (1.727 m), weight 127 kg (279 lb), SpO2 98%.,Body mass index is 42.42 kg/m².  Constitutional: Patient in no acute distress.  HEENT: PERR, EOMI, MMM.  Cardiovascular: Normal rate and regular rhythm.    Pulmonary/Chest: Effort normal and breath sounds normal.   Abdomen: Soft, + BS, NT.    Assessment/Plan:  Yaritza Dunne is a(n) 43 y.o. female with bipolar disorder.      Hypothyroidism.  Home:  levothyroxine 224 mcg M-F and 336 mcg on S/Sn.  TSH level <0.010, FT4 0.95 on 3/21/24.  Decrease levothyroxine 224 to 200 mcg daily.  Repeat TSH in 1 week on 3/28/24.  Adrenocortical insufficiency.  Patient is on hydrocortisone 15 mg daily.  Epilepsy.  Continue Depakote nightly.  Dyslipidemia.  Patient is on fish oil 1 mg three times daily.  Lower extremity edema.  Continue lasix 20 mg daily.  Hypomagnesemia.  Continue mag oxide 400 mg twice daily.   Vitamin D deficiency.  Patient is on vitamin D3 2000 units daily.  Vitamin B12 deficiency.  Patient is on vitamin B12 1000 mcg daily.  Intellectual disability.  Supportive therapy.   Prediabetes.  Hgb A1c 6.0% on 3/21/24.  Lifestyle modifications.   Left anterior neck abrasions.  Self inflicted.  Apply Bactroban three times daily.   Left wrist and ankle pain.  Had x-rays 3/20/24.  Left wrist x-ray showing no fracture or malalignment but remains skeletally immature.  Left ankle x-ray with no acute abnormality but has chronic deformity of ankle and foot as described with arthritic changes at the tibiotalar joint and prominent swelling.  " Tylenol and lidocaine patch as needed.  Apply ice as needed.   Dry eyes.  Start artifical tear drops twice daily.    The patient was discussed with Dr. Arguelles and he is in agreement with the above note.

## 2024-03-22 NOTE — SOCIAL WORK
Hearing Documentation    303 hearing held today;  in attendance:  Jay Cody - ; Jay Jimenez [facility] PD office; staff psych; , pt mother/guardian, pt;  303 recommendation upheld for up to an additional 20 days of inpt treatment at [facility];  303 expires: 4/11/24    Copy of results sent to scanning, copy on pt chart.
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
## 2024-03-23 PROCEDURE — 99232 SBSQ HOSP IP/OBS MODERATE 35: CPT

## 2024-03-23 RX ORDER — LIDOCAINE 50 MG/G
1 PATCH TOPICAL DAILY
Status: DISCONTINUED | OUTPATIENT
Start: 2024-03-23 | End: 2024-04-01 | Stop reason: HOSPADM

## 2024-03-23 RX ORDER — FLUTICASONE PROPIONATE 50 MCG
1 SPRAY, SUSPENSION (ML) NASAL DAILY PRN
Status: DISCONTINUED | OUTPATIENT
Start: 2024-03-23 | End: 2024-03-23

## 2024-03-23 RX ORDER — FLUTICASONE PROPIONATE 50 MCG
1 SPRAY, SUSPENSION (ML) NASAL DAILY
Status: DISCONTINUED | OUTPATIENT
Start: 2024-03-23 | End: 2024-04-01 | Stop reason: HOSPADM

## 2024-03-23 RX ADMIN — CLONAZEPAM 0.5 MG: 0.5 TABLET ORAL at 17:50

## 2024-03-23 RX ADMIN — SERTRALINE HYDROCHLORIDE 125 MG: 25 TABLET ORAL at 09:14

## 2024-03-23 RX ADMIN — Medication 400 MG: at 17:50

## 2024-03-23 RX ADMIN — Medication 2000 UNITS: at 09:13

## 2024-03-23 RX ADMIN — LEVOTHYROXINE SODIUM 336 MCG: 112 TABLET ORAL at 06:00

## 2024-03-23 RX ADMIN — B-COMPLEX W/ C & FOLIC ACID TAB 1 TABLET: TAB at 09:14

## 2024-03-23 RX ADMIN — MUPIROCIN 1 APPLICATION: 20 OINTMENT TOPICAL at 17:51

## 2024-03-23 RX ADMIN — DEXTRAN 70, GLYCERIN, HYPROMELLOSE 1 DROP: 1; 2; 3 SOLUTION/ DROPS OPHTHALMIC at 09:22

## 2024-03-23 RX ADMIN — DOCUSATE SODIUM 100 MG: 100 CAPSULE, LIQUID FILLED ORAL at 09:20

## 2024-03-23 RX ADMIN — OMEGA-3 FATTY ACIDS CAP 1000 MG 1000 MG: 1000 CAP at 20:55

## 2024-03-23 RX ADMIN — ACETAMINOPHEN 975 MG: 325 TABLET ORAL at 18:21

## 2024-03-23 RX ADMIN — CLONAZEPAM 0.5 MG: 0.5 TABLET ORAL at 09:19

## 2024-03-23 RX ADMIN — LIDOCAINE 5% 1 PATCH: 700 PATCH TOPICAL at 13:42

## 2024-03-23 RX ADMIN — OMEGA-3 FATTY ACIDS CAP 1000 MG 1000 MG: 1000 CAP at 15:19

## 2024-03-23 RX ADMIN — DIVALPROEX SODIUM 1500 MG: 500 TABLET, FILM COATED, EXTENDED RELEASE ORAL at 20:55

## 2024-03-23 RX ADMIN — DEXTRAN 70, GLYCERIN, HYPROMELLOSE 1 DROP: 1; 2; 3 SOLUTION/ DROPS OPHTHALMIC at 21:01

## 2024-03-23 RX ADMIN — ARIPIPRAZOLE 15 MG: 15 TABLET ORAL at 09:15

## 2024-03-23 RX ADMIN — Medication 1 TABLET: at 09:21

## 2024-03-23 RX ADMIN — FLUTICASONE PROPIONATE 1 SPRAY: 50 SPRAY, METERED NASAL at 15:21

## 2024-03-23 RX ADMIN — Medication 1 TABLET: at 17:51

## 2024-03-23 RX ADMIN — CYANOCOBALAMIN TAB 500 MCG 1000 MCG: 500 TAB at 09:13

## 2024-03-23 RX ADMIN — FUROSEMIDE 20 MG: 40 TABLET ORAL at 09:14

## 2024-03-23 RX ADMIN — Medication 1 TABLET: at 06:01

## 2024-03-23 RX ADMIN — OMEGA-3 FATTY ACIDS CAP 1000 MG 1000 MG: 1000 CAP at 09:14

## 2024-03-23 RX ADMIN — MUPIROCIN 1 APPLICATION: 20 OINTMENT TOPICAL at 09:22

## 2024-03-23 RX ADMIN — HYDROCORTISONE 15 MG: 10 TABLET ORAL at 09:18

## 2024-03-23 RX ADMIN — Medication 400 MG: at 09:13

## 2024-03-23 NOTE — PLAN OF CARE
Problem: Risk for Self Injury/Neglect  Goal: Refrain from harming self  Description: Interventions:  - Monitor patient closely, per order  - Develop a trusting relationship  - Supervise medication ingestion, monitor effects and side effects   Outcome: Progressing     Problem: Risk for Violence/Aggression Toward Others  Goal: Refrain from harming others  Outcome: Progressing     Problem: Risk for Violence/Aggression Toward Others  Goal: Refrain from destructive acts on the environment or property  Outcome: Progressing

## 2024-03-23 NOTE — PROGRESS NOTES
"Progress Note - Behavioral Health   Yaritza Dunne 43 y.o. female MRN: [id number]  Unit/Bed#: [facility] 256-02 Encounter: [id number]    Assessment/Plan   Principal Problem:    Bipolar 1 disorder, mixed, severe (HCC)    Patient requested to see provider to discuss her meals. She notes that she wants to be off finger foods and since patient has shown positive behaviors and without dangerous behvaiors will switch back to regular house. Patient notes that increased depakote has helped with her and she feels \"wonderful\" since. She does make multiple requests including asking for slippers, socks, extra pillows, hand brace, etc. And does need redirection while discussion. However, given that patient is improving will continue with current regimen and plan for level tomorrow night. Patient is eager to leave to return home where she notes having a party ready at her group home    Recommended Treatment:   Continue current regmien  Have medicine follow up given swollen feet  Remove finger foods and place on regular house  Valproic Acid level tomorrow night prior to dose    Continue with group therapy, milieu therapy and occupational therapy.    Continue frequent safety checks and vitals per unit protocol.  Case discussed with treatment team.  Continue with SLIM medical management as indicated  Continue coordinating with case management regarding disposition  Risks, benefits and possible side effects of Medications: Risks, benefits, and possible side effects of medications have previously been explained. No new medications at this time.    ------------------------------------------------------------    Subjective:  Today, Yaritza is consenting for safety on the unit. She reports feeling \"fine.\" Yaritza notes having good sleep. However, after questions about why staff has seen her up she mentions she is up for the bathroom and not because of nightmares or other issues. Yaritza states having a good appetite but wants to be off finger foods. Yaritza " "has been taking the medications as prescribed and reporting no side effects. She feels the dpeakote has made significant improvements to her restraint of behavior saying she was able to not get upset when someone took something from her earlier today.    Yaritza denies any suicidal ideations. Yaritza denies any homicidal ideations. Regarding hallucinations, Yaritza denies any.    PRNs overnight: lidocaine patch, tylenol 650mg x2   VS: Reviewed, within normal limits    Progress Toward Goals: slow improvement    Psychiatric Review of Systems:  Behavior over the last 24 hours: improved  Sleep: normal, awakens in night to use bathroom  Appetite: adequate, normal compared to baseline  Medication side effects: none verbalized  ROS: swollen feet, chronic left wrist pain (notes from breaking it growing up) Complete review of systems is negative except as noted above.    Vital signs in last 24 hours:  Temp:  [97.5 °F (36.4 °C)-98 °F (36.7 °C)] 97.5 °F (36.4 °C)  HR:  [78-98] 78  Resp:  [16-17] 17  BP: ()/(57-69) 132/69    Mental Status Exam:  Appearance:  age appropriate, decreased grooming/hygiene   Behavior:  cooperative, at times demanding   Speech:  Normal volume, decreased rate/staccato rate   Mood:  \"fine\"   Affect:  normal range and intensity   Thought Process:  concrete, somatic focused at times   Associations: concrete associations   Thought Content:  ruminations   Perceptual Disturbances: Denies auditory or visual hallucinations   Risk Potential: Suicidal ideation - None at present  Homicidal ideation - None at present  Potential for aggression - Not at present   Sensorium:  oriented to person, place, and time/date   Memory:  recent and remote memory grossly intact   Consciousness:  alert and awake   Attention/Concentration: attention span and concentration appear shorter than expected for age   Insight:  limited   Judgment: limited   Gait/Station: Abnormal gait given swollen feet   Motor Activity: no abnormal " movements     Current Medications:  Current Facility-Administered Medications   Medication Dose Route Frequency Provider Last Rate    acetaminophen  650 mg Oral Q6H PRN Leelee M Medei, CRNP      acetaminophen  650 mg Oral Q4H PRN Leelee M Medei, CRNP      acetaminophen  975 mg Oral Q6H PRN Leelee M Medei, CRNP      albuterol  2 puff Inhalation Q6H PRN Rosa Isela Irizarry PA-C      aluminum-magnesium hydroxide-simethicone  30 mL Oral Q4H PRN Leelee M Medei, CRNP      ARIPiprazole  15 mg Oral Daily Steven Dunlap MD      Artificial Tears  1 drop Both Eyes 4x Daily PRN Rosa Isela Irizarry PA-C      Artificial Tears  1 drop Both Eyes BID Keshia Funes PA-C      benztropine  1 mg Oral Q6H PRN Leelee Stephenei, CRNP      calcium carbonate-vitamin D  1 tablet Oral BID With Meals Rosa Isela Irizarry PA-C      cholecalciferol  2,000 Units Oral Daily Rosa Isela Irizarry PA-C      clonazePAM  0.5 mg Oral BID Hallie Zhou MD      cyanocobalamin  1,000 mcg Oral Daily Rosa Isela Irizarry PA-C      divalproex sodium  1,500 mg Oral HS Steven Dunlap MD      docusate sodium  100 mg Oral Daily Rosa Isela Irizarry PA-C      fish oil  1,000 mg Oral TID Rosa Isela Irizarry PA-C      furosemide  20 mg Oral Daily Rosa Isela Irizarry PA-C      hydrocortisone  15 mg Oral Daily Rosa Isela Irizarry PA-C      hydrOXYzine HCL  25 mg Oral Q6H PRN Max 4/day Leelee M Medei, CRNP      hydrOXYzine HCL  50 mg Oral Q4H PRN Max 4/day Leelee M Medei, CRTIFFANY      Or    LORazepam  1 mg Intramuscular Q4H PRN Leelee LUNA Medei, CRNP      levothyroxine  200 mcg Oral Once per day on Monday Tuesday Wednesday Thursday Friday Keshia Funes PA-C      levothyroxine  336 mcg Oral Once per day on Sunday Saturday Rosa Isela Irizarry PA-C      lidocaine  1 patch Topical Daily PRN Rosa Isela Ken Irizarry, PA-C      LORazepam  1 mg Oral Q6H PRN LENO Saunders      Or    LORazepam  2 mg  Intramuscular Q6H PRN Max 3/day Leelee M Medei, CRNP      magnesium Oxide  400 mg Oral BID Rosa Isela Irizarry PA-C      meclizine  25 mg Oral Q12H PRN Rosa Isela Irizarry PA-C      meloxicam  15 mg Oral Daily PRN Rosa Isela Irizarry PA-C      multivitamin stress formula  1 tablet Oral Daily Steven Dunlap MD      multivitamin-minerals  1 tablet Oral Daily Rosa Isela Irizarry PA-C      mupirocin  1 Application Topical TID Rosa Isela Irizarry PA-C      OLANZapine  10 mg Oral Q3H PRN Max 3/day Leelee M Medei, CRNP      Or    OLANZapine  10 mg Intramuscular Q3H PRN Max 3/day Leelee M Medei, CRNP      OLANZapine  5 mg Oral Q3H PRN Max 6/day Leelee M Medei, CRNP      Or    OLANZapine  5 mg Intramuscular Q3H PRN Max 6/day Leelee M Medei, CRNP      OLANZapine  2.5 mg Oral Q3H PRN Max 8/day Leelee M Medei, CRNP      polyethylene glycol  17 g Oral Daily PRN Leelee M Medei, CRNP      sertraline  125 mg Oral Daily Steven Dunlap MD      traZODone  100 mg Oral HS PRN Leelee M Medei, CRNP      triamcinolone   Topical BID PRN Rosa Isela Irizarry PA-C         Behavioral Health Medications: all current active meds have been reviewed. Changes as in plan section above.    Laboratory results:  I have personally reviewed all pertinent laboratory/tests results.  Recent Results (from the past 48 hour(s))   ECG 12 lead    Collection Time: 03/21/24  7:40 PM   Result Value Ref Range    Ventricular Rate 70 BPM    Atrial Rate 70 BPM    UT Interval 164 ms    QRSD Interval 112 ms    QT Interval 416 ms    QTC Interval 449 ms    P [place] 10 degrees    QRS Axis 24 degrees    T Wave Axis 60 degrees   Valproic acid level, total    Collection Time: 03/22/24  5:11 AM   Result Value Ref Range    Valproic Acid, Total 55 50 - 100 ug/mL   Ammonia    Collection Time: 03/22/24  5:11 AM   Result Value Ref Range    Ammonia 63 18 - 72 umol/L        This note has been constructed using a voice recognition  system. There may be translation, syntax, or grammatical errors. If you have any questions, please contact the dictating author.    Ankit Mandujano, DO

## 2024-03-23 NOTE — PROGRESS NOTES
"Progress Note - Yaritza Dunne 43 y.o. female MRN: [id number]    Unit/Bed#: [facility] 256-02 Encounter: [id number]        Subjective:   Patient seen and examined at bedside after reviewing the chart and discussing the case with the caring staff.      Patient examined at bedside.  Patient is concerned about lower extremity edema. She is on Lasix 20 mg. Patient also requesting brace for wrist and Flonase for allergies.     Physical Exam   Vitals: Blood pressure 132/69, pulse 78, temperature 97.5 °F (36.4 °C), temperature source Temporal, resp. rate 17, height 5' 8\" (1.727 m), weight 127 kg (279 lb), SpO2 98%.,Body mass index is 42.42 kg/m².  Constitutional: Patient in no acute distress.  HEENT: PERR, EOMI, MMM.  Cardiovascular: Normal rate and regular rhythm.    Pulmonary/Chest: Effort normal and breath sounds normal.   Abdomen: Soft, + BS, NT.    Assessment/Plan:  Yaritza Dunne is a(n) 43 y.o. female with bipolar disorder.      Hypothyroidism.  Home:  levothyroxine 224 mcg M-F and 336 mcg on S/Sn.  TSH level <0.010, FT4 0.95 on 3/21/24.  Decrease levothyroxine 224 to 200 mcg daily.  Repeat TSH in 1 week on 3/28/24.  Adrenocortical insufficiency.  Patient is on hydrocortisone 15 mg daily.  Epilepsy.  Continue Depakote nightly.  Dyslipidemia.  Patient is on fish oil 1 mg three times daily.  Lower extremity edema.  Continue lasix 20 mg daily. Consider increase in dose or compression socks.   Hypomagnesemia.  Continue mag oxide 400 mg twice daily.   Vitamin D deficiency.  Patient is on vitamin D3 2000 units daily.  Vitamin B12 deficiency.  Patient is on vitamin B12 1000 mcg daily.  Intellectual disability.  Supportive therapy.   Prediabetes.  Hgb A1c 6.0% on 3/21/24.  Lifestyle modifications.   Left anterior neck abrasions.  Self inflicted.  Apply Bactroban three times daily.   Left wrist and ankle pain.  Had x-rays 3/20/24.  Left wrist x-ray showing no fracture or malalignment but remains skeletally immature.  Left ankle x-ray " with no acute abnormality but has chronic deformity of ankle and foot as described with arthritic changes at the tibiotalar joint and prominent swelling.  Tylenol as needed. Lidocaine patch daily.  Apply ice as needed. PT OT consulted.    Dry eyes.  Start artifical tear drops twice daily.  Allergic Rhinitis. Start Flonase daily.     The patient was discussed with Dr. Arguelles and he is in agreement with the above note.

## 2024-03-24 LAB — VALPROATE SERPL-MCNC: 66 UG/ML (ref 50–100)

## 2024-03-24 PROCEDURE — 80164 ASSAY DIPROPYLACETIC ACD TOT: CPT

## 2024-03-24 PROCEDURE — 99232 SBSQ HOSP IP/OBS MODERATE 35: CPT

## 2024-03-24 RX ORDER — FUROSEMIDE 40 MG/1
40 TABLET ORAL DAILY
Status: DISCONTINUED | OUTPATIENT
Start: 2024-03-25 | End: 2024-04-01 | Stop reason: HOSPADM

## 2024-03-24 RX ADMIN — DEXTRAN 70, GLYCERIN, HYPROMELLOSE 1 DROP: 1; 2; 3 SOLUTION/ DROPS OPHTHALMIC at 08:12

## 2024-03-24 RX ADMIN — Medication 2000 UNITS: at 08:06

## 2024-03-24 RX ADMIN — Medication 1 TABLET: at 06:36

## 2024-03-24 RX ADMIN — OMEGA-3 FATTY ACIDS CAP 1000 MG 1000 MG: 1000 CAP at 08:07

## 2024-03-24 RX ADMIN — OMEGA-3 FATTY ACIDS CAP 1000 MG 1000 MG: 1000 CAP at 20:38

## 2024-03-24 RX ADMIN — HYDROCORTISONE 15 MG: 10 TABLET ORAL at 08:06

## 2024-03-24 RX ADMIN — FLUTICASONE PROPIONATE 1 SPRAY: 50 SPRAY, METERED NASAL at 08:15

## 2024-03-24 RX ADMIN — OMEGA-3 FATTY ACIDS CAP 1000 MG 1000 MG: 1000 CAP at 15:33

## 2024-03-24 RX ADMIN — LEVOTHYROXINE SODIUM 336 MCG: 112 TABLET ORAL at 06:35

## 2024-03-24 RX ADMIN — CYANOCOBALAMIN TAB 500 MCG 1000 MCG: 500 TAB at 08:06

## 2024-03-24 RX ADMIN — SERTRALINE HYDROCHLORIDE 125 MG: 25 TABLET ORAL at 08:06

## 2024-03-24 RX ADMIN — DIVALPROEX SODIUM 1500 MG: 500 TABLET, FILM COATED, EXTENDED RELEASE ORAL at 20:38

## 2024-03-24 RX ADMIN — MUPIROCIN 1 APPLICATION: 20 OINTMENT TOPICAL at 20:42

## 2024-03-24 RX ADMIN — Medication 1 TABLET: at 15:33

## 2024-03-24 RX ADMIN — Medication 400 MG: at 17:28

## 2024-03-24 RX ADMIN — CLONAZEPAM 0.5 MG: 0.5 TABLET ORAL at 17:29

## 2024-03-24 RX ADMIN — MUPIROCIN 1 APPLICATION: 20 OINTMENT TOPICAL at 08:12

## 2024-03-24 RX ADMIN — TRAZODONE HYDROCHLORIDE 100 MG: 100 TABLET ORAL at 20:38

## 2024-03-24 RX ADMIN — DOCUSATE SODIUM 100 MG: 100 CAPSULE, LIQUID FILLED ORAL at 08:07

## 2024-03-24 RX ADMIN — MUPIROCIN 1 APPLICATION: 20 OINTMENT TOPICAL at 17:29

## 2024-03-24 RX ADMIN — B-COMPLEX W/ C & FOLIC ACID TAB 1 TABLET: TAB at 08:06

## 2024-03-24 RX ADMIN — ARIPIPRAZOLE 15 MG: 15 TABLET ORAL at 08:07

## 2024-03-24 RX ADMIN — Medication 400 MG: at 08:07

## 2024-03-24 RX ADMIN — CLONAZEPAM 0.5 MG: 0.5 TABLET ORAL at 08:07

## 2024-03-24 RX ADMIN — DEXTRAN 70, GLYCERIN, HYPROMELLOSE 1 DROP: 1; 2; 3 SOLUTION/ DROPS OPHTHALMIC at 20:40

## 2024-03-24 RX ADMIN — Medication 1 TABLET: at 08:14

## 2024-03-24 RX ADMIN — LIDOCAINE 5% 1 PATCH: 700 PATCH TOPICAL at 08:07

## 2024-03-24 NOTE — PROGRESS NOTES
"Progress Note - Yaritza Dunne 43 y.o. female MRN: [id number]    Unit/Bed#: [facility] 256-02 Encounter: [id number]        Subjective:   Patient seen and examined at bedside after reviewing the chart and discussing the case with the caring staff.      Patient examined at bedside.  Patient continues to complain of lower extremity edema. This is causing discomfort when walking. She is on Lasix 20 mg. Denies any other symptoms.     Physical Exam   Vitals: Blood pressure 102/67, pulse 78, temperature 97.9 °F (36.6 °C), temperature source Temporal, resp. rate 17, height 5' 8\" (1.727 m), weight 129 kg (284 lb 12.8 oz), SpO2 95%.,Body mass index is 43.3 kg/m².  Constitutional: Patient in no acute distress.  HEENT: PERR, EOMI, MMM.  Cardiovascular: Normal rate and regular rhythm.    Pulmonary/Chest: Effort normal and breath sounds normal.   Abdomen: Soft, + BS, NT.  Skin: BLE edema.     Assessment/Plan:  Yaritza Dunne is a(n) 43 y.o. female with bipolar disorder.      Hypothyroidism.  Home:  levothyroxine 224 mcg M-F and 336 mcg on S/Sn.  TSH level <0.010, FT4 0.95 on 3/21/24.  Decrease levothyroxine 224 to 200 mcg daily.  Repeat TSH in 1 week on 3/28/24.  Adrenocortical insufficiency.  Patient is on hydrocortisone 15 mg daily.  Epilepsy.  Continue Depakote nightly.  Dyslipidemia.  Patient is on fish oil 1 mg three times daily.  Lower extremity edema.  Increase lasix to 40 mg daily 3/24. Continue to monitor.   Hypomagnesemia.  Continue mag oxide 400 mg twice daily.   Vitamin D deficiency.  Patient is on vitamin D3 2000 units daily.  Vitamin B12 deficiency.  Patient is on vitamin B12 1000 mcg daily.  Intellectual disability.  Supportive therapy.   Prediabetes.  Hgb A1c 6.0% on 3/21/24.  Lifestyle modifications.   Left anterior neck abrasions.  Self inflicted.  Apply Bactroban three times daily.   Left wrist and ankle pain.  Had x-rays 3/20/24.  Left wrist x-ray showing no fracture or malalignment but remains skeletally immature.  Left " ankle x-ray with no acute abnormality but has chronic deformity of ankle and foot as described with arthritic changes at the tibiotalar joint and prominent swelling.  Tylenol as needed. Lidocaine patch daily.  Apply ice as needed. PT OT consulted.    Dry eyes.  Start artifical tear drops twice daily.  Allergic Rhinitis. Start Flonase daily.     The patient was discussed with Dr. Arguelles and he is in agreement with the above note.

## 2024-03-24 NOTE — PROGRESS NOTES
"Progress Note - Behavioral Health   Yaritza Dunne 43 y.o. female MRN: [id number]  Unit/Bed#: [facility] 256-02 Encounter: [id number]    Assessment/Plan   Principal Problem:    Bipolar 1 disorder, mixed, severe (HCC)    Patient was seen in her room in bright spirits believing that she was set to be discharged tomorrow.  She was educated that this was not to the knowledge of provider and that discussions tomorrow with primary team about patient's discharge can be addressed.  Patient notes that the medication adjustments continue to do well and that there have not been any issues.  Patient does have some physical complaints which include leg pain and patient does notably have swollen feet with medicine consult placed yesterday.  Given the patient has recently underwent increased Depakote level will draw a level tonight to see if further adjustments need to be made.    Recommended Treatment:   Plan for valproic acid level tonight  Over the weekend slight increase in Depakote and slight reduction in Zoloft occurred which seemed to have improved patient's mood and behaviors.    Continue with group therapy, milieu therapy and occupational therapy.    Continue frequent safety checks and vitals per unit protocol.  Case discussed with treatment team.  Continue with SLIM medical management as indicated  Continue coordinating with case management regarding disposition  Risks, benefits and possible side effects of Medications: Risks, benefits, and possible side effects of medications have previously been explained. No new medications at this time.  ------------------------------------------------------------    Subjective:   Today, Yaritza is consenting for safety on the unit. She reports feeling \" happy.\"  She notes feeling this way because she says that she is going to get \"discharged.\"  She notes that she signed papers with a man with glasses stating that she would be leaving Monday.  She notes this is preparation with her upcoming " "birthday that she wants to celebrate at her group home.  She was reminded that this was not to the knowledge of the provider about her upcoming \"discharge\" and to better discuss this with her primary team tomorrow.  She was notified that which she believes to be discharged tomorrow may not actually be accurate though she denied provider saying that this was accurate.    Yaritza notes having some issues with sleep because of \"pins-and-needles\" pain in her right leg.  She was reminded that medicine will evaluate her legs because of them being swollen may be attributing to this leg pain.  She did state that she took Tylenol and to help her with the pain and sleep.  Yaritza states having a normal appetite. Yaritza has been taking the medications as prescribed and reporting no noticeable side effects.    Yaritza denies any suicidal ideations. Yaritza denies any homicidal ideations. Regarding hallucinations, Yaritza denies any.    PRNs overnight: Tylenol 975, lidocaine patch  VS: Reviewed, within normal limits    Progress Toward Goals: slow improvement    Psychiatric Review of Systems:  Behavior over the last 24 hours: improved  Sleep:  Some difficulties due to leg pain  Appetite: adequate, normal compared to baseline  Medication side effects: none verbalized  ROS: Complete review of systems is negative except as noted above.    Vital signs in last 24 hours:  Temp:  [97.9 °F (36.6 °C)] 97.9 °F (36.6 °C)  HR:  [67-78] 78  Resp:  [16-17] 17  BP: ()/(53-67) 102/67    Mental Status Exam:  Appearance:  age appropriate, decreased hygiene and grooming bilaterally swollen legs and feet   Behavior:  Childlike -easily distracted, interrupts, at times intrusive and demanding, but redirectable   Speech:  Normal volume, staccato rate   Mood:  \"Happy\"   Affect:  overbright   Thought Process:  concrete, somatically focused   Associations: concrete associations   Thought Content:  preoccupied with discharge and physical symptoms   Perceptual " Disturbances: Denies auditory or visual hallucinations   Risk Potential: Suicidal ideation - None at present  Homicidal ideation - None at present  Potential for aggression - Not at present   Sensorium:  oriented to person, place, and time/date   Memory:  recent and remote memory grossly intact   Consciousness:  alert and awake   Attention/Concentration: attention span and concentration appear shorter than expected for age   Insight:  limited   Judgment: limited   Gait/Station: Seated in a chair   Motor Activity: no abnormal movements     Current Medications:  Current Facility-Administered Medications   Medication Dose Route Frequency Provider Last Rate    acetaminophen  650 mg Oral Q6H PRN Leelee M Medei, CRNP      acetaminophen  650 mg Oral Q4H PRN Leelee M Medei, CRNP      acetaminophen  975 mg Oral Q6H PRN Leelee M Medei, CRNP      albuterol  2 puff Inhalation Q6H PRN Rosa Isela Irizarry PA-C      aluminum-magnesium hydroxide-simethicone  30 mL Oral Q4H PRN Leelee M Medei, CRNP      ARIPiprazole  15 mg Oral Daily Steven Dunlap MD      Artificial Tears  1 drop Both Eyes 4x Daily PRN Rosa Isela Irizarry PA-C      Artificial Tears  1 drop Both Eyes BID Keshia Funes PA-C      benztropine  1 mg Oral Q6H PRN Leelee LUNA Medei, CRNP      calcium carbonate-vitamin D  1 tablet Oral BID With Meals Rosa Isela Irizarry PA-C      cholecalciferol  2,000 Units Oral Daily Rosa Isela Irizarry PA-C      clonazePAM  0.5 mg Oral BID Hallie Zhou MD      cyanocobalamin  1,000 mcg Oral Daily Rosa Isela Irizarry PA-C      divalproex sodium  1,500 mg Oral HS Steven Dunlap MD      docusate sodium  100 mg Oral Daily Rosa Isela Irizarry PA-C      fish oil  1,000 mg Oral TID Rosa Isela Irizarry PA-C      fluticasone  1 spray Each Nare Daily Rosa Isela Irizarry PA-C      furosemide  20 mg Oral Daily Rosa Isela Irizarry PA-C      hydrocortisone  15 mg Oral Daily  Rosa Isela Irizarry PA-C      hydrOXYzine HCL  25 mg Oral Q6H PRN Max 4/day Leelee M Medei, CRNP      hydrOXYzine HCL  50 mg Oral Q4H PRN Max 4/day Leelee M Medei, CRNP      Or    LORazepam  1 mg Intramuscular Q4H PRN Leelee M Medei, CRNP      levothyroxine  200 mcg Oral Once per day on Monday Tuesday Wednesday Thursday Friday Keshia Funes PA-C      levothyroxine  336 mcg Oral Once per day on Sunday Saturday Rosa Isela Irizarry PA-C      lidocaine  1 patch Topical Daily Rosa Isela Irizarry PA-C      LORazepam  1 mg Oral Q6H PRN Leelee M Medei, CRNP      Or    LORazepam  2 mg Intramuscular Q6H PRN Max 3/day Leelee M Medei, CRNP      magnesium Oxide  400 mg Oral BID Rosa Isela Irizarry PA-C      meclizine  25 mg Oral Q12H PRN Rosa Isela Irizarry PA-C      meloxicam  15 mg Oral Daily PRN Rosa Isela Irizarry PA-C      multivitamin stress formula  1 tablet Oral Daily Steven Dunlap MD      multivitamin-minerals  1 tablet Oral Daily Rosa Isela Irizarry PA-C      mupirocin  1 Application Topical TID Rosa Isela Irizarry PA-C      OLANZapine  10 mg Oral Q3H PRN Max 3/day Leelee M Medei, CRNP      Or    OLANZapine  10 mg Intramuscular Q3H PRN Max 3/day Leelee M Medei, CRNP      OLANZapine  5 mg Oral Q3H PRN Max 6/day Leelee M Medei, CRNP      Or    OLANZapine  5 mg Intramuscular Q3H PRN Max 6/day Leelee M Medei, CRNP      OLANZapine  2.5 mg Oral Q3H PRN Max 8/day Leelee M Medei, CRNP      polyethylene glycol  17 g Oral Daily PRN Leelee M Medei, CRNP      sertraline  125 mg Oral Daily Steven Dunlap MD      traZODone  100 mg Oral HS PRN Leelee M Medei, CRNP      triamcinolone   Topical BID PRN Rosa Isela Irizarry PA-C         Behavioral Health Medications: all current active meds have been reviewed. Changes as in plan section above.    Laboratory results:  I have personally reviewed all pertinent laboratory/tests results.  No results found for this or any  previous visit (from the past 48 hour(s)).     This note has been constructed using a voice recognition system. There may be translation, syntax, or grammatical errors. If you have any questions, please contact the dictating author.    Ankit Mandujano, DO

## 2024-03-25 PROCEDURE — 99232 SBSQ HOSP IP/OBS MODERATE 35: CPT | Performed by: HOSPITALIST

## 2024-03-25 RX ORDER — ONDANSETRON 4 MG/1
4 TABLET, ORALLY DISINTEGRATING ORAL EVERY 6 HOURS PRN
Status: DISCONTINUED | OUTPATIENT
Start: 2024-03-25 | End: 2024-04-01 | Stop reason: HOSPADM

## 2024-03-25 RX ORDER — LOPERAMIDE HYDROCHLORIDE 2 MG/1
2 CAPSULE ORAL 4 TIMES DAILY PRN
Status: DISCONTINUED | OUTPATIENT
Start: 2024-03-25 | End: 2024-04-01 | Stop reason: HOSPADM

## 2024-03-25 RX ORDER — SERTRALINE HYDROCHLORIDE 100 MG/1
100 TABLET, FILM COATED ORAL DAILY
Status: DISCONTINUED | OUTPATIENT
Start: 2024-03-26 | End: 2024-04-01 | Stop reason: HOSPADM

## 2024-03-25 RX ADMIN — CLONAZEPAM 0.5 MG: 0.5 TABLET ORAL at 17:42

## 2024-03-25 RX ADMIN — DOCUSATE SODIUM 100 MG: 100 CAPSULE, LIQUID FILLED ORAL at 08:30

## 2024-03-25 RX ADMIN — MUPIROCIN 1 APPLICATION: 20 OINTMENT TOPICAL at 20:53

## 2024-03-25 RX ADMIN — Medication 1 TABLET: at 15:58

## 2024-03-25 RX ADMIN — DEXTRAN 70, GLYCERIN, HYPROMELLOSE 1 DROP: 1; 2; 3 SOLUTION/ DROPS OPHTHALMIC at 20:53

## 2024-03-25 RX ADMIN — Medication 400 MG: at 08:30

## 2024-03-25 RX ADMIN — HYDROCORTISONE 15 MG: 10 TABLET ORAL at 08:29

## 2024-03-25 RX ADMIN — MUPIROCIN 1 APPLICATION: 20 OINTMENT TOPICAL at 18:38

## 2024-03-25 RX ADMIN — LEVOTHYROXINE SODIUM 200 MCG: 0.1 TABLET ORAL at 10:13

## 2024-03-25 RX ADMIN — MECLIZINE 25 MG: 12.5 TABLET ORAL at 15:58

## 2024-03-25 RX ADMIN — ARIPIPRAZOLE 15 MG: 15 TABLET ORAL at 08:30

## 2024-03-25 RX ADMIN — FUROSEMIDE 40 MG: 40 TABLET ORAL at 08:30

## 2024-03-25 RX ADMIN — CYANOCOBALAMIN TAB 500 MCG 1000 MCG: 500 TAB at 08:30

## 2024-03-25 RX ADMIN — CLONAZEPAM 0.5 MG: 0.5 TABLET ORAL at 08:29

## 2024-03-25 RX ADMIN — Medication 1 TABLET: at 08:29

## 2024-03-25 RX ADMIN — OMEGA-3 FATTY ACIDS CAP 1000 MG 1000 MG: 1000 CAP at 15:58

## 2024-03-25 RX ADMIN — OMEGA-3 FATTY ACIDS CAP 1000 MG 1000 MG: 1000 CAP at 20:54

## 2024-03-25 RX ADMIN — TRAZODONE HYDROCHLORIDE 100 MG: 100 TABLET ORAL at 20:54

## 2024-03-25 RX ADMIN — OMEGA-3 FATTY ACIDS CAP 1000 MG 1000 MG: 1000 CAP at 08:30

## 2024-03-25 RX ADMIN — Medication 2000 UNITS: at 10:14

## 2024-03-25 RX ADMIN — SERTRALINE HYDROCHLORIDE 125 MG: 25 TABLET ORAL at 08:29

## 2024-03-25 RX ADMIN — DIVALPROEX SODIUM 1500 MG: 500 TABLET, FILM COATED, EXTENDED RELEASE ORAL at 20:53

## 2024-03-25 NOTE — PROGRESS NOTES
03/25/24   Team Meeting   Meeting Type Daily Rounds   Team Members Present   Team Members Present Physician;Nurse;Occupational Therapist;   Physician Team Member Dr Haley CORONADO; Yeison BURR   Nursing Team Member Jesús MATTHEWS   Social Work Team Member Raymond LEON; John LEON   OT Team Member Yessy GALEAS   Patient/Family Present   Patient Present No   Patient's Family Present No     303 upheld Fri, tearful, labile, intrusive, needs redirection, hx frontal lobe damage, child like, somatic, feels anger issues are resolved, hx seizures tx by Depakote, outburst crying today, dc focused; no dc date.

## 2024-03-25 NOTE — PLAN OF CARE
Problem: Alteration in Thoughts and Perception  Goal: Verbalize thoughts and feelings  Description: Interventions:  - Promote a nonjudgmental and trusting relationship with the patient through active listening and therapeutic communication  - Assess patient's level of functioning, behavior and potential for risk  - Engage patient in 1 on 1 interactions  - Encourage patient to express fears, feelings, frustrations, and discuss symptoms    - Dorothy patient to reality, help patient recognize reality-based thinking   - Administer medications as ordered and assess for potential side effects  - Provide the patient education related to the signs and symptoms of the illness and desired effects of prescribed medications  Outcome: Progressing     Problem: Ineffective Coping  Goal: Participates in unit activities  Description: Interventions:  - Provide therapeutic environment   - Provide required programming   - Redirect inappropriate behaviors   Outcome: Progressing

## 2024-03-25 NOTE — PROGRESS NOTES
"Progress Note - Yaritza Dunne 43 y.o. female MRN: [id number]    Unit/Bed#: [facility] 256-02 Encounter: [id number]        Subjective:   Patient seen and examined at bedside after reviewing the chart and discussing the case with the caring staff.      Patient examined at bedside.  Patient has no acute complaints. States her legs feel a little better.     Physical Exam   Vitals: Blood pressure 116/75, pulse 74, temperature 97.8 °F (36.6 °C), temperature source Temporal, resp. rate 17, height 5' 8\" (1.727 m), weight 129 kg (284 lb 12.8 oz), SpO2 98%.,Body mass index is 43.3 kg/m².  Constitutional: Patient in no acute distress.  HEENT: PERR, EOMI, MMM.  Cardiovascular: Normal rate and regular rhythm.    Pulmonary/Chest: Effort normal and breath sounds normal.   Abdomen: Soft, + BS, NT.  Skin: BLE edema.     Assessment/Plan:  Yaritza Dunne is a(n) 43 y.o. female with bipolar disorder.      Hypothyroidism.  Home:  levothyroxine 224 mcg M-F and 336 mcg on S/Sn.  TSH level <0.010, FT4 0.95 on 3/21/24.  Decrease levothyroxine 224 to 200 mcg daily.  Repeat TSH in 1 week on 3/28/24.  Adrenocortical insufficiency.  Patient is on hydrocortisone 15 mg daily.  Epilepsy.  Continue Depakote nightly.  Dyslipidemia.  Patient is on fish oil 1 mg three times daily.  Lower extremity edema.  Lasix 20 mg daily. Increase lasix to 40 mg daily 3/24. Continue to monitor.   Hypomagnesemia.  Continue mag oxide 400 mg twice daily.   Vitamin D deficiency.  Patient is on vitamin D3 2000 units daily.  Vitamin B12 deficiency.  Patient is on vitamin B12 1000 mcg daily.  Intellectual disability.  Supportive therapy.   Prediabetes.  Hgb A1c 6.0% on 3/21/24.  Lifestyle modifications.   Left anterior neck abrasions.  Self inflicted.  Apply Bactroban three times daily.   Left wrist and ankle pain.  Had x-rays 3/20/24.  Left wrist x-ray showing no fracture or malalignment but remains skeletally immature.  Left ankle x-ray with no acute abnormality but has chronic " deformity of ankle and foot as described with arthritic changes at the tibiotalar joint and prominent swelling.  Tylenol as needed. Lidocaine patch daily.  Apply ice as needed. PT OT consulted.    Dry eyes.  Start artifical tear drops twice daily.  Allergic Rhinitis. Start Flonase daily.     The patient was discussed with Dr. Arguelles and he is in agreement with the above note.

## 2024-03-25 NOTE — PROGRESS NOTES
03/25/24 1330   Activity/Group Checklist   Group   (Self Assessment)   Attendance Attended   Attendance Duration (min) 0-15   Interactions Interacted appropriately   Affect/Mood Appropriate   Goals Achieved Identified feelings;Discussed coping strategies;Able to listen to others;Able to engage in interactions     AT met with pt to introduce self to pt and pt completed the self assessment form along with AT. PT identified her biggest stressor leading to this admission as she got angry and tried to slice her throat. PT reports that she enjoys playing games, singing, art and crafts and computers. PT reported that she was in the special [place]ics. PT would like to learn more about healthy coping skills, developing relationships and anger management while she is here in the hospital.

## 2024-03-25 NOTE — PLAN OF CARE
Problem: Risk for Violence/Aggression Toward Others  Goal: Control angry outbursts  Description: Interventions:  - Monitor patient closely, per order  - Ensure early verbal de-escalation  - Monitor prn medication needs  - Set reasonable/therapeutic limits, outline behavioral expectations, and consequences   - Provide a non-threatening milieu, utilizing the least restrictive interventions   Outcome: Progressing     Problem: Risk for Violence/Aggression Toward Others  Goal: Refrain from destructive acts on the environment or property  Outcome: Progressing     Problem: Risk for Violence/Aggression Toward Others  Goal: Refrain from harming others  Outcome: Progressing     Problem: Ineffective Coping  Goal: Participates in unit activities  Description: Interventions:  - Provide therapeutic environment   - Provide required programming   - Redirect inappropriate behaviors   Outcome: Progressing     Problem: Alteration in Thoughts and Perception  Goal: Agree to be compliant with medication regime, as prescribed and report medication side effects  Description: Interventions:  - Offer appropriate PRN medication and supervise ingestion; conduct AIMS, as needed   Outcome: Progressing

## 2024-03-25 NOTE — PROGRESS NOTES
"Progress Note - Behavioral Health   Yairtza Dunne 43 y.o. female MRN: [id number]  Unit/Bed#: [facility] 256-02 Encounter: [id number]    Assessment/Plan   Principal Problem:    Bipolar 1 disorder, mixed, severe (HCC)      Behavior over the last 24 hours:  unchanged  Sleep: normal  Appetite: normal  Medication side effects: No  ROS: no complaints and all other systems are negative    Yaritza is visible and participatory in milieu activities.  Presents as concrete and childlike.  Has been maintaining safety with no return of SI/SIB.  Preservative on discharge for her birthday, but responds well to redirection.  Mood labile, but redirectable.  Compliant with medications and meals    Mental Status Evaluation:  Appearance:  Tall female, frizzy hair, disheveled, malodorous   Behavior:  Childlike, intrusive, labile, directable   Speech:  Mumbled   Mood:  \"I want to go home\"   Affect:  labile   Thought Process:  concrete and perserverative   Associations: concrete associations   Thought Content:  No overt delusions or paranoia verbalized, ruminations regarding discharge   Perceptual Disturbances: Denied AVH, did not appear internally preoccupied   Risk Potential: Suicidal Ideations none  Homicidal Ideations none  Potential for Aggression Not at present   Sensorium:  person, place, and time/date   Memory:  recent and remote memory grossly intact   Consciousness:  alert and awake    Attention: Decreased attention/concentration   Insight:  Impaired , hx ID   Judgment: Impaired , hx ID   Gait/Station: normal gait/station and slow   Motor Activity: no abnormal movements     Progress Toward Goals: Mood remains labile with childlike behaviors.  No return of SI and has been maintaining safety.  Responds well to redirection.  Plan is to further taper Zoloft 100 mg PO QD to reduce mood instability in the context of bipolar disorder.  Continue remainder of psychotropic regimen as ordered.  Should patient exhibit improvement with mood control and " maintain safety, consider discharge by end of week.    Recommended Treatment: Continue with group therapy, milieu therapy and occupational therapy.      Risks, benefits and possible side effects of Medications:   Yaritza has limited understanding of risk versus benefits of medications, but agrees to take as prescribed.    Medications:   Decrease Zoloft 100 mg PO QD  all current active meds have been reviewed and current meds:   Current Facility-Administered Medications   Medication Dose Route Frequency    acetaminophen (TYLENOL) tablet 650 mg  650 mg Oral Q6H PRN    acetaminophen (TYLENOL) tablet 650 mg  650 mg Oral Q4H PRN    acetaminophen (TYLENOL) tablet 975 mg  975 mg Oral Q6H PRN    albuterol (PROVENTIL HFA,VENTOLIN HFA) inhaler 2 puff  2 puff Inhalation Q6H PRN    aluminum-magnesium hydroxide-simethicone (MAALOX) oral suspension 30 mL  30 mL Oral Q4H PRN    ARIPiprazole (ABILIFY) tablet 15 mg  15 mg Oral Daily    Artificial Tears ophthalmic solution 1 drop  1 drop Both Eyes 4x Daily PRN    Artificial Tears ophthalmic solution 1 drop  1 drop Both Eyes BID    benztropine (COGENTIN) tablet 1 mg  1 mg Oral Q6H PRN    calcium carbonate-vitamin D 500 mg-5 mcg tablet 1 tablet  1 tablet Oral BID With Meals    cholecalciferol (VITAMIN D3) tablet 2,000 Units  2,000 Units Oral Daily    clonazePAM (KlonoPIN) tablet 0.5 mg  0.5 mg Oral BID    cyanocobalamin (VITAMIN B-12) tablet 1,000 mcg  1,000 mcg Oral Daily    divalproex sodium (DEPAKOTE ER) 24 hr tablet 1,500 mg  1,500 mg Oral HS    docusate sodium (COLACE) capsule 100 mg  100 mg Oral Daily    fish oil capsule 1,000 mg  1,000 mg Oral TID    fluticasone (FLONASE) 50 mcg/act nasal spray 1 spray  1 spray Each Nare Daily    furosemide (LASIX) tablet 40 mg  40 mg Oral Daily    hydrocortisone (CORTEF) tablet 15 mg  15 mg Oral Daily    hydrOXYzine HCL (ATARAX) tablet 25 mg  25 mg Oral Q6H PRN Max 4/day    hydrOXYzine HCL (ATARAX) tablet 50 mg  50 mg Oral Q4H PRN Max 4/day    Or     LORazepam (ATIVAN) injection 1 mg  1 mg Intramuscular Q4H PRN    levothyroxine tablet 200 mcg  200 mcg Oral Once per day on Monday Tuesday Wednesday Thursday Friday    levothyroxine tablet 336 mcg  336 mcg Oral Once per day on Sunday Saturday    lidocaine (LIDODERM) 5 % patch 1 patch  1 patch Topical Daily    LORazepam (ATIVAN) tablet 1 mg  1 mg Oral Q6H PRN    Or    LORazepam (ATIVAN) injection 2 mg  2 mg Intramuscular Q6H PRN Max 3/day    magnesium Oxide (MAG-OX) tablet 400 mg  400 mg Oral BID    meclizine (ANTIVERT) tablet 25 mg  25 mg Oral Q12H PRN    meloxicam (MOBIC) tablet 15 mg  15 mg Oral Daily PRN    multivitamin stress formula tablet 1 tablet  1 tablet Oral Daily    mupirocin (BACTROBAN) 2 % ointment 1 Application  1 Application Topical TID    OLANZapine (ZyPREXA) tablet 10 mg  10 mg Oral Q3H PRN Max 3/day    Or    OLANZapine (ZyPREXA) IM injection 10 mg  10 mg Intramuscular Q3H PRN Max 3/day    OLANZapine (ZyPREXA) tablet 5 mg  5 mg Oral Q3H PRN Max 6/day    Or    OLANZapine (ZyPREXA) IM injection 5 mg  5 mg Intramuscular Q3H PRN Max 6/day    OLANZapine (ZyPREXA) tablet 2.5 mg  2.5 mg Oral Q3H PRN Max 8/day    polyethylene glycol (MIRALAX) packet 17 g  17 g Oral Daily PRN    [START ON 3/26/2024] sertraline (ZOLOFT) tablet 100 mg  100 mg Oral Daily    traZODone (DESYREL) tablet 100 mg  100 mg Oral HS PRN    triamcinolone (KENALOG) 0.1 % cream   Topical BID PRN   .    Labs: I have personally reviewed all pertinent laboratory/tests results. CBC:   Lab Results   Component Value Date    WBC 6.01 03/21/2024    RBC 4.19 03/21/2024    HGB 12.2 03/21/2024    HCT 37.4 03/21/2024    MCV 89 03/21/2024     03/21/2024    MCH 29.1 03/21/2024    MCHC 32.6 03/21/2024    RDW 12.4 03/21/2024    MPV 11.5 03/21/2024    NEUTROABS 2.45 03/21/2024     CMP:   Lab Results   Component Value Date    SODIUM 140 03/21/2024    K 4.1 03/21/2024     03/21/2024    CO2 29 03/21/2024    AGAP 9 03/21/2024    BUN 13  03/21/2024    CREATININE 0.52 (L) 03/21/2024    GLUC 89 03/21/2024    GLUF 89 03/21/2024    CALCIUM 8.9 03/21/2024    AST 13 03/21/2024    ALT 14 03/21/2024    ALKPHOS 58 03/21/2024    TP 5.3 (L) 03/21/2024    ALB 3.4 (L) 03/21/2024    TBILI 0.22 03/21/2024    EGFR 116 03/21/2024     EKG   Lab Results   Component Value Date    VENTRATE 70 03/21/2024    ATRIALRATE 70 03/21/2024    PRINT 164 03/21/2024    QRSDINT 112 03/21/2024    QTINT 416 03/21/2024    QTCINT 449 03/21/2024    PAXIS 10 03/21/2024    QRSAXIS 24 03/21/2024    TWAVEAXIS 60 03/21/2024       Counseling / Coordination of Care  Total floor / unit time spent today 30 minutes. Greater than 50% of total time was spent with the patient and / or family counseling and / or coordination of care. A description of the counseling / coordination of care: Medication education, treatment plan, supportive therapy, safety planning   Cimetidine Counseling:  I discussed with the patient the risks of Cimetidine including but not limited to gynecomastia, headache, diarrhea, nausea, drowsiness, arrhythmias, pancreatitis, skin rashes, psychosis, bone marrow suppression and kidney toxicity.

## 2024-03-26 PROCEDURE — 97163 PT EVAL HIGH COMPLEX 45 MIN: CPT

## 2024-03-26 PROCEDURE — 99232 SBSQ HOSP IP/OBS MODERATE 35: CPT | Performed by: HOSPITALIST

## 2024-03-26 RX ORDER — DIVALPROEX SODIUM 500 MG/1
1500 TABLET, EXTENDED RELEASE ORAL
Status: DISCONTINUED | OUTPATIENT
Start: 2024-03-26 | End: 2024-04-01 | Stop reason: HOSPADM

## 2024-03-26 RX ADMIN — CLONAZEPAM 0.5 MG: 0.5 TABLET ORAL at 17:35

## 2024-03-26 RX ADMIN — FUROSEMIDE 40 MG: 40 TABLET ORAL at 08:01

## 2024-03-26 RX ADMIN — CLONAZEPAM 0.5 MG: 0.5 TABLET ORAL at 08:01

## 2024-03-26 RX ADMIN — LIDOCAINE 5% 1 PATCH: 700 PATCH TOPICAL at 08:01

## 2024-03-26 RX ADMIN — LEVOTHYROXINE SODIUM 200 MCG: 0.1 TABLET ORAL at 05:30

## 2024-03-26 RX ADMIN — Medication 400 MG: at 08:01

## 2024-03-26 RX ADMIN — DIVALPROEX SODIUM 1500 MG: 500 TABLET, FILM COATED, EXTENDED RELEASE ORAL at 20:04

## 2024-03-26 RX ADMIN — Medication 1 TABLET: at 08:01

## 2024-03-26 RX ADMIN — Medication 400 MG: at 17:35

## 2024-03-26 RX ADMIN — DOCUSATE SODIUM 100 MG: 100 CAPSULE, LIQUID FILLED ORAL at 08:01

## 2024-03-26 RX ADMIN — Medication 1 TABLET: at 17:35

## 2024-03-26 RX ADMIN — OMEGA-3 FATTY ACIDS CAP 1000 MG 1000 MG: 1000 CAP at 08:01

## 2024-03-26 RX ADMIN — OMEGA-3 FATTY ACIDS CAP 1000 MG 1000 MG: 1000 CAP at 20:04

## 2024-03-26 RX ADMIN — SERTRALINE 100 MG: 100 TABLET, FILM COATED ORAL at 08:01

## 2024-03-26 RX ADMIN — FLUTICASONE PROPIONATE 1 SPRAY: 50 SPRAY, METERED NASAL at 08:06

## 2024-03-26 RX ADMIN — Medication 2000 UNITS: at 08:01

## 2024-03-26 RX ADMIN — B-COMPLEX W/ C & FOLIC ACID TAB 1 TABLET: TAB at 08:01

## 2024-03-26 RX ADMIN — CYANOCOBALAMIN TAB 500 MCG 1000 MCG: 500 TAB at 08:01

## 2024-03-26 RX ADMIN — OMEGA-3 FATTY ACIDS CAP 1000 MG 1000 MG: 1000 CAP at 17:35

## 2024-03-26 RX ADMIN — HYDROCORTISONE 15 MG: 10 TABLET ORAL at 08:01

## 2024-03-26 RX ADMIN — ARIPIPRAZOLE 15 MG: 15 TABLET ORAL at 08:01

## 2024-03-26 NOTE — PROGRESS NOTES
"Progress Note - Yaritza Dunne 44 y.o. female MRN: [id number]    Unit/Bed#: [facility] 256-02 Encounter: [id number]        Subjective:   Patient seen and examined at bedside after reviewing the chart and discussing the case with the caring staff.      Patient examined at bedside.  Patient is complaining of diarrhea and nausea. States she threw up last night. Nurses unable to confirm this. Patient frequently has these complaints but never witnessed. Denies any other symptoms.     Physical Exam   Vitals: Blood pressure 116/58, pulse 71, temperature 97.6 °F (36.4 °C), temperature source Temporal, resp. rate 17, height 5' 8\" (1.727 m), weight 129 kg (284 lb 12.8 oz), SpO2 99%.,Body mass index is 43.3 kg/m².  Constitutional: Patient in no acute distress.  HEENT: PERR, EOMI, MMM.  Cardiovascular: Normal rate and regular rhythm.    Pulmonary/Chest: Effort normal and breath sounds normal.   Abdomen: Soft, + BS, NT.  Skin: BLE edema.     Assessment/Plan:  Yaritza Dunne is a(n) 43 y.o. female with bipolar disorder.      Hypothyroidism.  Home:  levothyroxine 224 mcg M-F and 336 mcg on S/Sn.  TSH level <0.010, FT4 0.95 on 3/21/24.  Decrease levothyroxine 224 to 200 mcg daily.  Repeat TSH in 1 week on 3/28/24.  Adrenocortical insufficiency.  Patient is on hydrocortisone 15 mg daily.  Epilepsy.  Continue Depakote nightly.  Dyslipidemia.  Patient is on fish oil 1 mg three times daily.  Lower extremity edema.  Lasix 20 mg daily. Increase lasix to 40 mg daily 3/24. Continue to monitor.   Hypomagnesemia.  Continue mag oxide 400 mg twice daily.   Vitamin D deficiency.  Patient is on vitamin D3 2000 units daily.  Vitamin B12 deficiency.  Patient is on vitamin B12 1000 mcg daily.  Intellectual disability.  Supportive therapy.   Prediabetes.  Hgb A1c 6.0% on 3/21/24.  Lifestyle modifications.   Left anterior neck abrasions.  Self inflicted.  Apply Bactroban three times daily.   Left wrist and ankle pain.  Had x-rays 3/20/24.  Left wrist x-ray " showing no fracture or malalignment but remains skeletally immature.  Left ankle x-ray with no acute abnormality but has chronic deformity of ankle and foot as described with arthritic changes at the tibiotalar joint and prominent swelling.  Tylenol as needed. Lidocaine patch daily.  Apply ice as needed. PT OT consulted.    Dry eyes.  Start artifical tear drops twice daily.  Allergic Rhinitis. Start Flonase daily.     The patient was discussed with Dr. Arguelles and he is in agreement with the above note.

## 2024-03-26 NOTE — SOCIAL WORK
Pt notified of 303 results, need for further med adjustments. Pt presents calm, appropriate, dc focused. Support provided.

## 2024-03-26 NOTE — CASE MANAGEMENT
"CM met with patient for status update. The patient was observed in her room, lying in the bed coloring. Her initial response to approach was, \"I'm taking my meds and feel ready to leave.\" Able to maintain even temperament when made aware she would not be leaving today or tomorrow, as well as when speaking of trigger at the time of admission. \"I just got mad because someone took my ring.\"  \"But I'm not mad now.\"  Able to identify coping skills she uses to offset anger. Denied A/V hallucinations; denied depression/anxiety or lethality. Stated she felt happy today as it is her birthday and she received a call from her mother. Stated she doesn't often speak with her but happy she called today. CM reinforced positive gains. Will continue to monitor/assess and assist in meeting needs presented.   "

## 2024-03-26 NOTE — PLAN OF CARE
Problem: Alteration in Thoughts and Perception  Goal: Verbalize thoughts and feelings  Description: Interventions:  - Promote a nonjudgmental and trusting relationship with the patient through active listening and therapeutic communication  - Assess patient's level of functioning, behavior and potential for risk  - Engage patient in 1 on 1 interactions  - Encourage patient to express fears, feelings, frustrations, and discuss symptoms    - [place] patient to reality, help patient recognize reality-based thinking   - Administer medications as ordered and assess for potential side effects  - Provide the patient education related to the signs and symptoms of the illness and desired effects of prescribed medications  Outcome: Progressing  Goal: Agree to be compliant with medication regime, as prescribed and report medication side effects  Description: Interventions:  - Offer appropriate PRN medication and supervise ingestion; conduct AIMS, as needed   Outcome: Progressing

## 2024-03-26 NOTE — SOCIAL WORK
Kiley Dunne (contact/mother/guardian) [phone] contacted to inform of 303 results, progress update provided, review of meds. Pt was on Depakote since 2006, was very beneficial long term for impulsivity and attention span; depakote with abilify works well. Last Tuesday, pt had MRI after finding spot on pancreas, is unsure what results were or if it's contributing to GI upset-medical provider notified. Call ended mutually.    Guerline (Assistant Clinical Director/contact)  [phone] contacted for progress update, 303 update; vm left.

## 2024-03-26 NOTE — PLAN OF CARE
Problem: PHYSICAL THERAPY ADULT  Goal: Performs mobility at highest level of function for planned discharge setting.  See evaluation for individualized goals.  Description: Treatment/Interventions: Functional transfer training, Therapeutic exercise, Endurance training, Gait training, Bed mobility, Equipment eval/education  Equipment Recommended: Wheelchair       See flowsheet documentation for full assessment, interventions and recommendations.  Outcome: Progressing  Note: Prognosis: Fair     Assessment: Pt is 44 y.o. female seen for PT evaluation s/p admit to [facility] on 3/20/2024 w/ Bipolar 1 disorder, mixed, severe (HCC). PT consulted to assess pt's functional mobility and d/c needs. Order placed for PT eval and tx, w/ up and OOB as tolerated order. Pt agreeable to PT  session upon arrival, pt found on toilet in bathroom.  PTA, pt was independent w/ all functional mobility w/ no AD, ambulates household distances, and lives in a group home .  Pt to benefit from continued PT tx to address deficits and maximize level of functional independent mobility and consistency. Upon conclusion pt  seated EOB. Complexity: Comorbidities affecting pt's physical performance at time of assessment include:  frontal lobe damage, ID and L foot disorder . Personal factors affecting pt at time of IE include: limited insight into impairments, inability to ambulate household distances, depression, anxiety, and decreased cognition. Please find objective findings from PT assessment regarding body systems outlined above with impairments and limitations including weakness, impaired balance, decreased endurance, pain, decreased activity tolerance, decreased functional mobility tolerance, and fall risk.  Pt's clinical presentation is currently unstable/unpredictable seen in pt's presentation of pain and confusion. The patient's AM-PAC Basic Mobility Inpatient Short Form Raw Score is 21.  Based on patient presentations and  impairments, pt would most appropriately benefit from Level 3 resource intensity upon discharge. Please also refer to the recommendation of the Physical Therapist for safe discharge planning.  Barriers to Discharge: None     Rehab Resource Intensity Level, PT: III (Minimum Resource Intensity)    See flowsheet documentation for full assessment.

## 2024-03-26 NOTE — PROGRESS NOTES
03/26/24    Team Meeting   Meeting Type Daily Rounds   Team Members Present   Team Members Present Nurse;Occupational Therapist;Physician;   Physician Team Member Dr Haley CORONADO; Yeison BURR   Nursing Team Member Jesús MATTHEWS   Social Work Team Member Raymond LEON   OT Team Member Yessy GALEAS   Patient/Family Present   Patient Present No   Patient's Family Present No      303, baby doll as coping skills, GI upset, birthday today, meds adjusted; no dc date.

## 2024-03-26 NOTE — PROGRESS NOTES
"Progress Note - Behavioral Health   Yaritza Dunne 44 y.o. female MRN: [id number]  Unit/Bed#: [facility] 256-02 Encounter: [id number]    Assessment/Plan   Principal Problem:    Bipolar 1 disorder, mixed, severe (HCC)      Behavior over the last 24 hours: Improved  Sleep: PRN trazodone effective  Appetite: Fair  Medication side effects: No  ROS:  Intermittent diarrhea and all other systems are negative    Yaritza is intermittently visible in the milieu and has been maintaining safety and mood control.  Less labile today with no verbal outbursts.  Preservative on discharge, but responds well to redirection.  Requires encouragement with showering and self-care.  Denies depression.  Reports increased anxiety due to peer stimulation on unit, but has been utilizing coping skills effectively with no PRN anxiolytics or antipsychotics.  Identifies talking with staff on unit and at group home as safety plan.    Mental Status Evaluation:  Appearance:  Tall  female, disheveled, malodorous, resting in bed, frizzy hair, holding baby doll   Behavior:  Childlike, calm, redirectable   Speech:  Scant, mumbled at times   Mood:  \"Tired\"   Affect:  blunted and less labile   Thought Process:  concrete and perserverative   Associations: concrete associations   Thought Content:  No overt delusions or paranoia verbalized, less negative thinking   Perceptual Disturbances: Denied AVH, did not appear internally preoccupied   Risk Potential: Suicidal Ideations none  Homicidal Ideations none  Potential for Aggression No   Sensorium:  person, place, time/date, and situation   Memory:  recent and remote memory grossly intact   Consciousness:  alert and awake    Attention: Decreased attention/concentration   Insight:  Impaired, history of ID   Judgment: Impaired, history of ID   Gait/Station: normal gait/station and slow   Motor Activity: no abnormal movements     Progress Toward Goals: Less labile with no behavioral outbursts.  Denies SI and " maintaining safety.  Tolerating taper of Zoloft well to reduce mood instability in the context of bipolar disorder.  Will continue with remainder of psychotropic regimen as ordered. No PRN antipsychotic or anxiolytic utilized over the past 4 days.  Remains childlike.  Potential discharge by end of week should patient continue to maintain mood control and safety.    Recommended Treatment: Continue with group therapy, milieu therapy and occupational therapy.      Risks, benefits and possible side effects of Medications:   Yaritza has limited understanding of risk versus benefits of medications, but agrees to take as prescribed.    Medications: all current active meds have been reviewed, continue current psychiatric medications, and current meds:   Current Facility-Administered Medications   Medication Dose Route Frequency    acetaminophen (TYLENOL) tablet 650 mg  650 mg Oral Q6H PRN    acetaminophen (TYLENOL) tablet 650 mg  650 mg Oral Q4H PRN    acetaminophen (TYLENOL) tablet 975 mg  975 mg Oral Q6H PRN    albuterol (PROVENTIL HFA,VENTOLIN HFA) inhaler 2 puff  2 puff Inhalation Q6H PRN    aluminum-magnesium hydroxide-simethicone (MAALOX) oral suspension 30 mL  30 mL Oral Q4H PRN    ARIPiprazole (ABILIFY) tablet 15 mg  15 mg Oral Daily    Artificial Tears ophthalmic solution 1 drop  1 drop Both Eyes 4x Daily PRN    Artificial Tears ophthalmic solution 1 drop  1 drop Both Eyes BID    benztropine (COGENTIN) tablet 1 mg  1 mg Oral Q6H PRN    calcium carbonate-vitamin D 500 mg-5 mcg tablet 1 tablet  1 tablet Oral BID With Meals    cholecalciferol (VITAMIN D3) tablet 2,000 Units  2,000 Units Oral Daily    clonazePAM (KlonoPIN) tablet 0.5 mg  0.5 mg Oral BID    cyanocobalamin (VITAMIN B-12) tablet 1,000 mcg  1,000 mcg Oral Daily    divalproex sodium (DEPAKOTE ER) 24 hr tablet 1,500 mg  1,500 mg Oral HS    docusate sodium (COLACE) capsule 100 mg  100 mg Oral Daily    fish oil capsule 1,000 mg  1,000 mg Oral TID    fluticasone  (FLONASE) 50 mcg/act nasal spray 1 spray  1 spray Each Nare Daily    furosemide (LASIX) tablet 40 mg  40 mg Oral Daily    hydrocortisone (CORTEF) tablet 15 mg  15 mg Oral Daily    hydrOXYzine HCL (ATARAX) tablet 25 mg  25 mg Oral Q6H PRN Max 4/day    hydrOXYzine HCL (ATARAX) tablet 50 mg  50 mg Oral Q4H PRN Max 4/day    Or    LORazepam (ATIVAN) injection 1 mg  1 mg Intramuscular Q4H PRN    levothyroxine tablet 200 mcg  200 mcg Oral Once per day on Monday Tuesday Wednesday Thursday Friday    levothyroxine tablet 336 mcg  336 mcg Oral Once per day on Sunday Saturday    lidocaine (LIDODERM) 5 % patch 1 patch  1 patch Topical Daily    loperamide (IMODIUM) capsule 2 mg  2 mg Oral 4x Daily PRN    LORazepam (ATIVAN) tablet 1 mg  1 mg Oral Q6H PRN    Or    LORazepam (ATIVAN) injection 2 mg  2 mg Intramuscular Q6H PRN Max 3/day    magnesium Oxide (MAG-OX) tablet 400 mg  400 mg Oral BID    meclizine (ANTIVERT) tablet 25 mg  25 mg Oral Q12H PRN    meloxicam (MOBIC) tablet 15 mg  15 mg Oral Daily PRN    multivitamin stress formula tablet 1 tablet  1 tablet Oral Daily    OLANZapine (ZyPREXA) tablet 10 mg  10 mg Oral Q3H PRN Max 3/day    Or    OLANZapine (ZyPREXA) IM injection 10 mg  10 mg Intramuscular Q3H PRN Max 3/day    OLANZapine (ZyPREXA) tablet 5 mg  5 mg Oral Q3H PRN Max 6/day    Or    OLANZapine (ZyPREXA) IM injection 5 mg  5 mg Intramuscular Q3H PRN Max 6/day    OLANZapine (ZyPREXA) tablet 2.5 mg  2.5 mg Oral Q3H PRN Max 8/day    ondansetron (ZOFRAN-ODT) dispersible tablet 4 mg  4 mg Oral Q6H PRN    polyethylene glycol (MIRALAX) packet 17 g  17 g Oral Daily PRN    sertraline (ZOLOFT) tablet 100 mg  100 mg Oral Daily    traZODone (DESYREL) tablet 100 mg  100 mg Oral HS PRN    triamcinolone (KENALOG) 0.1 % cream   Topical BID PRN   .    Labs: I have personally reviewed all pertinent laboratory/tests results. CBC:   Lab Results   Component Value Date    WBC 6.01 03/21/2024    RBC 4.19 03/21/2024    HGB 12.2 03/21/2024     HCT 37.4 03/21/2024    MCV 89 03/21/2024     03/21/2024    MCH 29.1 03/21/2024    MCHC 32.6 03/21/2024    RDW 12.4 03/21/2024    MPV 11.5 03/21/2024    NEUTROABS 2.45 03/21/2024     CMP:   Lab Results   Component Value Date    SODIUM 140 03/21/2024    K 4.1 03/21/2024     03/21/2024    CO2 29 03/21/2024    AGAP 9 03/21/2024    BUN 13 03/21/2024    CREATININE 0.52 (L) 03/21/2024    GLUC 89 03/21/2024    GLUF 89 03/21/2024    CALCIUM 8.9 03/21/2024    AST 13 03/21/2024    ALT 14 03/21/2024    ALKPHOS 58 03/21/2024    TP 5.3 (L) 03/21/2024    ALB 3.4 (L) 03/21/2024    TBILI 0.22 03/21/2024    EGFR 116 03/21/2024     Depakote:   Lab Results   Component Value Date    VALPROICTOT 66 03/24/2024     EKG   Lab Results   Component Value Date    VENTRATE 70 03/21/2024    ATRIALRATE 70 03/21/2024    PRINT 164 03/21/2024    QRSDINT 112 03/21/2024    QTINT 416 03/21/2024    QTCINT 449 03/21/2024    PAXIS 10 03/21/2024    QRSAXIS 24 03/21/2024    TWAVEAXIS 60 03/21/2024       Counseling / Coordination of Care  Total floor / unit time spent today 30 minutes. Greater than 50% of total time was spent with the patient and / or family counseling and / or coordination of care. A description of the counseling / coordination of care: Medication education, treatment plan, safety planning

## 2024-03-26 NOTE — PHYSICAL THERAPY NOTE
PHYSICAL THERAPY EVALUATION  NAME:  Yaritza Dunne  DATE: 03/26/24    AGE:   44 y.o.  Mrn:   [id number]  ADMIT DX:  Mood disorder (HCC) [F39]  Problem List:   Patient Active Problem List   Diagnosis    Lumbar radiculopathy    Salter-Hendricks Type IV fracture of lower end of left tibia    Closed pilon fracture of left tibia    Intellectual disability    Unspecified mood (affective) disorder (HCC)    Intermittent explosive disorder    Epilepsy (HCC)    Adrenocortical insufficiency (HCC)    Hypothyroidism    Obesity    Dyslipidemia    Edema    Open toe wound    T wave inversion in EKG    Bipolar 1 disorder, mixed, severe (HCC)       Past Medical History  Past Medical History:   Diagnosis Date    Adrenocortical insufficiency (HCC)     Bipolar disorder (HCC)     CVA (cerebral vascular accident) (HCC)     Disease of thyroid gland     hypo    Disorder of bone density and structure, unspecified     Epilepsy (HCC)     Hyperlipidemia     Hypopituitarism (HCC)     Mild intellectual disabilities     Psychiatric disorder     bipolar       Past Surgical History  Past Surgical History:   Procedure Laterality Date    EPIDURAL BLOCK INJECTION N/A 8/23/2023    Procedure: L3-L4  LUMBAR epidural steroid injection (06763);  Surgeon: Adam Hayden DO;  Location: [facility] MAIN OR;  Service: Pain Management        Length Of Stay: 6  Performed at least 2 patient identifiers during session: Name and Birthday       03/26/24 0837   PT Last Visit   PT Visit Date 03/26/24   Note Type   Note type Evaluation   Pain Assessment   Pain Assessment Tool 0-10   Pain Score 10 - Worst Possible Pain   Pain Location/Orientation Orientation: Bilateral;Location: Leg   Pain Onset/Description Descriptor: Throbbing   Restrictions/Precautions   Weight Bearing Precautions Per Order No   Braces or Orthoses   (pt reported is used to have ankle braces but no longer wears them)   Other Precautions Cognitive;Fall Risk   Home Living   Type of Home Group Home  ([facility])    Home Layout One level   Bathroom Shower/Tub Walk-in shower   Bathroom Toilet Standard   Bathroom Equipment Grab bars in shower;Grab bars around toilet   Home Equipment Wheelchair-manual  (pt reported she used a w/c for transportation)   Prior Function   Level of [place] Independent with ADLs;Independent with functional mobility;Needs assistance with IADLS   Lives With Facility staff   Receives Help From Personal care attendant   IADLs Family/Friend/Other provides transportation;Family/Friend/Other provides meals;Family/Friend/Other provides medication management   General   Family/Caregiver Present No   Cognition   Overall Cognitive Status Impaired   Arousal/Participation Alert   Orientation Level Oriented X4   Memory Decreased recall of recent events   Following Commands Follows one step commands without difficulty   Comments child-like with interaction   RLE Assessment   RLE Assessment X   Strength RLE   R Hip Flexion 3+/5   R Hip ABduction 3+/5   R Knee Extension 3+/5   R Ankle Dorsiflexion 3/5   LLE Assessment   LLE Assessment X   Strength LLE   L Hip Flexion 3+/5   L Hip ABduction 3+/5   L Knee Extension 3+/5   L Ankle Dorsiflexion 3/5   Vision-Basic Assessment   Current Vision Wears glasses only for reading   Light Touch   RLE Light Touch Impaired   LLE Light Touch Impaired   Transfers   Sit to Stand 5  Supervision   Additional items Verbal cues;Increased time required   Stand to Sit 5  Supervision   Additional items Verbal cues;Increased time required   Toilet transfer 5  Supervision   Additional items Standard toilet;Verbal cues;Increased time required   Additional Comments pt denied dizziness with transitional movement   Ambulation/Elevation   Gait pattern L Foot drag;Improper Weight shift;Decreased foot clearance;Antalgic  (lack of step height with B feet; lean to the L)   Gait Assistance 5  Supervision   Additional items Verbal cues   Distance 15 ft   Ambulation/Elevation Additional Comments pt  reports more difficulty with ambulation compared to baseline due to not feeling well; pt would like to use w/c for mobility to the  due to fatigue   Balance   Static Sitting Good   Dynamic Sitting Good   Static Standing Fair -   Dynamic Standing Poor +   Ambulatory Poor +   Endurance Deficit   Endurance Deficit Yes   Endurance Deficit Description pt reported fatigue with activity   Activity Tolerance   Activity Tolerance Patient limited by fatigue;Patient limited by pain   Assessment   Prognosis Fair   Assessment Pt is 44 y.o. female seen for PT evaluation s/p admit to [facility] on 3/20/2024 w/ Bipolar 1 disorder, mixed, severe (HCC). PT consulted to assess pt's functional mobility and d/c needs. Order placed for PT eval and tx, w/ up and OOB as tolerated order. Pt agreeable to PT  session upon arrival, pt found on toilet in bathroom.  PTA, pt was independent w/ all functional mobility w/ no AD, ambulates household distances, and lives in a group home .  Pt to benefit from continued PT tx to address deficits and maximize level of functional independent mobility and consistency. Upon conclusion pt  seated EOB. Complexity: Comorbidities affecting pt's physical performance at time of assessment include:  frontal lobe damage, ID and L foot disorder . Personal factors affecting pt at time of IE include: limited insight into impairments, inability to ambulate household distances, depression, anxiety, and decreased cognition. Please find objective findings from PT assessment regarding body systems outlined above with impairments and limitations including weakness, impaired balance, decreased endurance, pain, decreased activity tolerance, decreased functional mobility tolerance, and fall risk.  Pt's clinical presentation is currently unstable/unpredictable seen in pt's presentation of pain and confusion. The patient's AM-PAC Basic Mobility Inpatient Short Form Raw Score is 21.  Based on patient presentations  and impairments, pt would most appropriately benefit from Level 3 resource intensity upon discharge. Please also refer to the recommendation of the Physical Therapist for safe discharge planning.   Barriers to Discharge None   Goals   Patient Goals to get a w/c   LTG Expiration Date 04/05/24   Long Term Goal #1 Pt will:  Perform transfers to modified I to improve ease of transfers. Perform ambulation with MI for 250 feet to increase Indep in home environment. Increase dynamic standing balance to F+ to decrease fall risk. Increase OOB activity tolerance to 10 minutes without s/s of exertion to decrease fall risk.   Plan   Treatment/Interventions Functional transfer training;Therapeutic exercise;Endurance training;Gait training;Bed mobility;Equipment eval/education   PT Frequency 2-3x/wk   Discharge Recommendation   Rehab Resource Intensity Level, PT III (Minimum Resource Intensity)   Equipment Recommended Wheelchair   AM-PAC Basic Mobility Inpatient   Turning in Flat Bed Without Bedrails 4   Lying on Back to Sitting on Edge of Flat Bed Without Bedrails 4   Moving Bed to Chair 4   Standing Up From Chair Using Arms 4   Walk in Room 3   Climb 3-5 Stairs With Railing 2   Basic Mobility Inpatient Raw Score 21   Basic Mobility Standardized Score 45.55   [facility] Highest Level Of Mobility   JH-HLM Goal 6: Walk 10 steps or more   JH-HLM Achieved 6: Walk 10 steps or more       Time In: 0827  Time Out: 0837  Total Evaluation Minutes: 10    Yanni Scott, PT

## 2024-03-27 LAB
BACTERIA UR QL AUTO: ABNORMAL /HPF
BILIRUB UR QL STRIP: NEGATIVE
CLARITY UR: ABNORMAL
COLOR UR: YELLOW
GLUCOSE UR STRIP-MCNC: NEGATIVE MG/DL
HGB UR QL STRIP.AUTO: ABNORMAL
KETONES UR STRIP-MCNC: NEGATIVE MG/DL
LEUKOCYTE ESTERASE UR QL STRIP: ABNORMAL
NITRITE UR QL STRIP: NEGATIVE
NON-SQ EPI CELLS URNS QL MICRO: ABNORMAL /HPF
PH UR STRIP.AUTO: 6 [PH]
PROT UR STRIP-MCNC: NEGATIVE MG/DL
RBC #/AREA URNS AUTO: ABNORMAL /HPF
SP GR UR STRIP.AUTO: 1.01
UROBILINOGEN UR QL STRIP.AUTO: 0.2 E.U./DL
WBC #/AREA URNS AUTO: ABNORMAL /HPF

## 2024-03-27 PROCEDURE — 97116 GAIT TRAINING THERAPY: CPT

## 2024-03-27 PROCEDURE — 97167 OT EVAL HIGH COMPLEX 60 MIN: CPT

## 2024-03-27 PROCEDURE — 99232 SBSQ HOSP IP/OBS MODERATE 35: CPT | Performed by: HOSPITALIST

## 2024-03-27 PROCEDURE — 81001 URINALYSIS AUTO W/SCOPE: CPT

## 2024-03-27 RX ORDER — NYSTATIN 100000 [USP'U]/G
POWDER TOPICAL 2 TIMES DAILY
Status: DISCONTINUED | OUTPATIENT
Start: 2024-03-27 | End: 2024-04-01 | Stop reason: HOSPADM

## 2024-03-27 RX ORDER — FLUCONAZOLE 150 MG/1
150 TABLET ORAL ONCE
Status: COMPLETED | OUTPATIENT
Start: 2024-03-27 | End: 2024-03-27

## 2024-03-27 RX ADMIN — DIVALPROEX SODIUM 1500 MG: 500 TABLET, FILM COATED, EXTENDED RELEASE ORAL at 20:51

## 2024-03-27 RX ADMIN — CYANOCOBALAMIN TAB 500 MCG 1000 MCG: 500 TAB at 08:03

## 2024-03-27 RX ADMIN — LEVOTHYROXINE SODIUM 200 MCG: 0.1 TABLET ORAL at 05:43

## 2024-03-27 RX ADMIN — HYDROCORTISONE 15 MG: 10 TABLET ORAL at 08:02

## 2024-03-27 RX ADMIN — FLUCONAZOLE 150 MG: 150 TABLET ORAL at 14:18

## 2024-03-27 RX ADMIN — Medication 2000 UNITS: at 08:03

## 2024-03-27 RX ADMIN — CLONAZEPAM 0.5 MG: 0.5 TABLET ORAL at 17:41

## 2024-03-27 RX ADMIN — FUROSEMIDE 40 MG: 40 TABLET ORAL at 08:03

## 2024-03-27 RX ADMIN — OMEGA-3 FATTY ACIDS CAP 1000 MG 1000 MG: 1000 CAP at 17:41

## 2024-03-27 RX ADMIN — DOCUSATE SODIUM 100 MG: 100 CAPSULE, LIQUID FILLED ORAL at 08:06

## 2024-03-27 RX ADMIN — Medication 400 MG: at 08:03

## 2024-03-27 RX ADMIN — FLUTICASONE PROPIONATE 1 SPRAY: 50 SPRAY, METERED NASAL at 08:07

## 2024-03-27 RX ADMIN — SERTRALINE 100 MG: 100 TABLET, FILM COATED ORAL at 08:02

## 2024-03-27 RX ADMIN — OMEGA-3 FATTY ACIDS CAP 1000 MG 1000 MG: 1000 CAP at 08:03

## 2024-03-27 RX ADMIN — Medication 1 TABLET: at 08:03

## 2024-03-27 RX ADMIN — ARIPIPRAZOLE 15 MG: 15 TABLET ORAL at 08:03

## 2024-03-27 RX ADMIN — NYSTATIN: 100000 POWDER TOPICAL at 17:42

## 2024-03-27 RX ADMIN — B-COMPLEX W/ C & FOLIC ACID TAB 1 TABLET: TAB at 08:03

## 2024-03-27 RX ADMIN — Medication 400 MG: at 17:41

## 2024-03-27 RX ADMIN — Medication 1 TABLET: at 17:41

## 2024-03-27 RX ADMIN — CLONAZEPAM 0.5 MG: 0.5 TABLET ORAL at 08:03

## 2024-03-27 RX ADMIN — OMEGA-3 FATTY ACIDS CAP 1000 MG 1000 MG: 1000 CAP at 20:50

## 2024-03-27 NOTE — SOCIAL WORK
Friday 11am dc planning meeting scheduled, link sent to Sanna Key <[email or web address]>, Guerline Friend <[email or web address]>    Kiley Dunne (contact/mother/guardian)  Ph: [phone] notified of possible Monday dc, dc planning meeting Friday 11am; link sent to mother email: [email or web address]

## 2024-03-27 NOTE — PLAN OF CARE
Problem: Alteration in Thoughts and Perception  Goal: Refrain from acting on delusional thinking/internal stimuli  Description: Interventions:  - Monitor patient closely, per order   - Utilize least restrictive measures   - Set reasonable limits, give positive feedback for acceptable   - Administer medications as ordered and monitor of potential side effects  Outcome: Progressing     Problem: Ineffective Coping  Goal: Participates in unit activities  Description: Interventions:  - Provide therapeutic environment   - Provide required programming   - Redirect inappropriate behaviors   Outcome: Progressing     Problem: Risk for Self Injury/Neglect  Goal: Refrain from harming self  Description: Interventions:  - Monitor patient closely, per order  - Develop a trusting relationship  - Supervise medication ingestion, monitor effects and side effects   Outcome: Progressing     Problem: Risk for Violence/Aggression Toward Others  Goal: Control angry outbursts  Description: Interventions:  - Monitor patient closely, per order  - Ensure early verbal de-escalation  - Monitor prn medication needs  - Set reasonable/therapeutic limits, outline behavioral expectations, and consequences   - Provide a non-threatening milieu, utilizing the least restrictive interventions   Outcome: Progressing

## 2024-03-27 NOTE — PLAN OF CARE
Problem: OCCUPATIONAL THERAPY ADULT  Goal: Performs self-care activities at highest level of function for planned discharge setting.  See evaluation for individualized goals.  Description: Treatment Interventions: ADL retraining, Functional transfer training, UE strengthening/ROM, Endurance training, Patient/family training, Compensatory technique education, Energy conservation, Activityengagement       See flowsheet documentation for full assessment, interventions and recommendations.   Note: Limitation: Decreased ADL status, Decreased UE strength, Decreased Safe judgement during ADL, Decreased endurance, Decreased self-care trans, Decreased high-level ADLs, Decreased UE ROM, Decreased cognition  Prognosis: Good  Assessment: Pt is a 44 y.o. female seen for OT evaluation s/p admit to [facility] on 3/20/2024 w/ Bipolar 1 disorder, mixed, severe (HCC).  Comorbidities affecting pt's functional performance at time of assessment include: lumbar radiculopathy, closed pilon fx of L tibia, ID, intgermittent explosive disorder, epilepsy, edema. Personal factors affecting pt at time of IE include:behavioral pattern, difficulty performing ADLS, difficulty performing IADLS , limited insight into deficits, decreased initiation and engagement , and health management . Prior to admission, pt was I with Adls and required A with IADLs. Upon evaluation: the following deficits impact occupational performance: weakness, decreased strength, decreased balance, decreased tolerance, impaired problem solving, decreased safety awareness, and increased pain. Pt to benefit from continued skilled OT tx while in the hospital to address deficits as defined above and maximize level of functional independence w ADL's and functional mobility. Occupational Performance areas to address include: grooming, bathing/shower, toilet hygiene, dressing, functional mobility, community mobility, and clothing management. From OT standpoint, recommendation at  time of d/c would with minimal intensity OT resources.     Rehab Resource Intensity Level, OT: III (Minimum Resource Intensity)     Raina Reardon MS, OTR/L

## 2024-03-27 NOTE — PROGRESS NOTES
"Progress Note - Yaritza Dunne 44 y.o. female MRN: [id number]    Unit/Bed#: [facility] 256-02 Encounter: [id number]        Subjective:   Patient seen and examined at bedside after reviewing the chart and discussing the case with the caring staff.      Patient examined at bedside.  Patient is complaining of burning with urination as well as itchiness in the genital area. She does not know when this began. She denies rash. Nurse also reports rash under abdominal and breast folds.     UA ordered.     Patient is a possible discharge for Monday, 4/1/24.     Physical Exam   Vitals: Blood pressure 119/54, pulse 83, temperature 98.2 °F (36.8 °C), temperature source Temporal, resp. rate 17, height 5' 8\" (1.727 m), weight 129 kg (284 lb 12.8 oz), SpO2 95%.,Body mass index is 43.3 kg/m².  Constitutional: Patient in no acute distress.  HEENT: PERR, EOMI, MMM.  Cardiovascular: Normal rate and regular rhythm.    Pulmonary/Chest: Effort normal and breath sounds normal.   Abdomen: Soft, + BS, NT.  Skin: BLE edema.     Assessment/Plan:  Yaritza Dunne is a(n) 43 y.o. female with bipolar disorder.      Hypothyroidism.  Home:  levothyroxine 224 mcg M-F and 336 mcg on S/Sn.  TSH level <0.010, FT4 0.95 on 3/21/24.  Decrease levothyroxine 224 to 200 mcg daily.  Repeat TSH in 1 week on 3/28/24.  Adrenocortical insufficiency.  Patient is on hydrocortisone 15 mg daily.  Epilepsy.  Continue Depakote nightly.  Dyslipidemia.  Patient is on fish oil 1 mg three times daily.  Lower extremity edema.  Lasix 20 mg daily. Increase lasix to 40 mg daily 3/24. Continue to monitor.   Hypomagnesemia.  Continue mag oxide 400 mg twice daily.   Vitamin D deficiency.  Patient is on vitamin D3 2000 units daily.  Vitamin B12 deficiency.  Patient is on vitamin B12 1000 mcg daily.  Intellectual disability.  Supportive therapy.   Prediabetes.  Hgb A1c 6.0% on 3/21/24.  Lifestyle modifications.   Left anterior neck abrasions.  Self inflicted.  Apply Bactroban three times " daily.   Left wrist and ankle pain.  Had x-rays 3/20/24.  Left wrist x-ray showing no fracture or malalignment but remains skeletally immature.  Left ankle x-ray with no acute abnormality but has chronic deformity of ankle and foot as described with arthritic changes at the tibiotalar joint and prominent swelling.  Tylenol as needed. Lidocaine patch daily.  Apply ice as needed. PT OT consulted.    Dry eyes.  Start artifical tear drops twice daily.  Allergic Rhinitis. Start Flonase daily.   Cutaneous candidiasis. Apply Nystatin powder to affected areas twice daily.  Yeast infection. Diflucan 150 mg x 1 dose.     The patient was discussed with Dr. Arguelles and he is in agreement with the above note.

## 2024-03-27 NOTE — PROGRESS NOTES
"Progress Note - Behavioral Health   Yaritza Dunne 44 y.o. female MRN: [id number]  Unit/Bed#: [facility] 256-02 Encounter: [id number]    Assessment/Plan   Principal Problem:    Bipolar 1 disorder, mixed, severe (HCC)      Behavior over the last 24 hours:  improved  Sleep: normal  Appetite: normal  Medication side effects: No  ROS: no complaints and all other systems are negative    Yaritza has been visible, appropriate, and participatory milieu activities.  Mood controlled with no verbal outbursts.  Maintaining safety with no return of SI.  Remains perseverative on discharge.  Behavior is childlike, but calm and cooperative.  Compliant with medications and meals and sleeping undisturbed throughout the night.    Mental Status Evaluation:  Appearance:  Tall, improving hygiene, braided hair, casually dressed   Behavior:  Childlike, cooperative   Speech:  Mumbled   Mood:  \"Good\"   Affect:  constricted, mood-congruent, and less labile   Thought Process:  concrete and perserverative   Associations: concrete associations   Thought Content:  No overt delusions or paranoia   Perceptual Disturbances: Denied AVH, did not appear internally preoccupied   Risk Potential: Suicidal Ideations none  Homicidal Ideations none  Potential for Aggression No   Sensorium:  person, place, and time/date   Memory:  recent and remote memory grossly intact   Consciousness:  alert and awake    Attention: Decreased attention/concentration   Insight:  Impaired, history of ID   Judgment: Impaired, history of ID   Gait/Station: slow   Motor Activity: no abnormal movements     Progress Toward Goals: Improving.  Mood has been controlled and less labile since taper of Zoloft.  Has been maintaining safety with no return of SI.  Visible and appropriate in the milieu.  Requires gentle encouragement with hygiene which has been slowly improving.  Will continue with current psychotropic regimen with plan to discharge home Monday, 4/1/24.    Recommended Treatment: Continue " with group therapy, milieu therapy and occupational therapy.      Risks, benefits and possible side effects of Medications:   Yaritza has limited understanding of risk versus benefits of medications, but agrees to take as prescribed.    Medications: all current active meds have been reviewed, continue current psychiatric medications, and current meds:   Current Facility-Administered Medications   Medication Dose Route Frequency    acetaminophen (TYLENOL) tablet 650 mg  650 mg Oral Q6H PRN    acetaminophen (TYLENOL) tablet 650 mg  650 mg Oral Q4H PRN    acetaminophen (TYLENOL) tablet 975 mg  975 mg Oral Q6H PRN    albuterol (PROVENTIL HFA,VENTOLIN HFA) inhaler 2 puff  2 puff Inhalation Q6H PRN    aluminum-magnesium hydroxide-simethicone (MAALOX) oral suspension 30 mL  30 mL Oral Q4H PRN    ARIPiprazole (ABILIFY) tablet 15 mg  15 mg Oral Daily    Artificial Tears ophthalmic solution 1 drop  1 drop Both Eyes Q6H PRN    benztropine (COGENTIN) tablet 1 mg  1 mg Oral Q6H PRN    calcium carbonate-vitamin D 500 mg-5 mcg tablet 1 tablet  1 tablet Oral BID With Meals    cholecalciferol (VITAMIN D3) tablet 2,000 Units  2,000 Units Oral Daily    clonazePAM (KlonoPIN) tablet 0.5 mg  0.5 mg Oral BID    cyanocobalamin (VITAMIN B-12) tablet 1,000 mcg  1,000 mcg Oral Daily    divalproex sodium (DEPAKOTE ER) 24 hr tablet 1,500 mg  1,500 mg Oral HS    docusate sodium (COLACE) capsule 100 mg  100 mg Oral Daily    fish oil capsule 1,000 mg  1,000 mg Oral TID    fluconazole (DIFLUCAN) tablet 150 mg  150 mg Oral Once    fluticasone (FLONASE) 50 mcg/act nasal spray 1 spray  1 spray Each Nare Daily    furosemide (LASIX) tablet 40 mg  40 mg Oral Daily    hydrocortisone (CORTEF) tablet 15 mg  15 mg Oral Daily    hydrOXYzine HCL (ATARAX) tablet 25 mg  25 mg Oral Q6H PRN Max 4/day    hydrOXYzine HCL (ATARAX) tablet 50 mg  50 mg Oral Q4H PRN Max 4/day    Or    LORazepam (ATIVAN) injection 1 mg  1 mg Intramuscular Q4H PRN    levothyroxine tablet 200  mcg  200 mcg Oral Once per day on Monday Tuesday Wednesday Thursday Friday    levothyroxine tablet 336 mcg  336 mcg Oral Once per day on Sunday Saturday    lidocaine (LIDODERM) 5 % patch 1 patch  1 patch Topical Daily    loperamide (IMODIUM) capsule 2 mg  2 mg Oral 4x Daily PRN    LORazepam (ATIVAN) tablet 1 mg  1 mg Oral Q6H PRN    Or    LORazepam (ATIVAN) injection 2 mg  2 mg Intramuscular Q6H PRN Max 3/day    magnesium Oxide (MAG-OX) tablet 400 mg  400 mg Oral BID    meclizine (ANTIVERT) tablet 25 mg  25 mg Oral Q12H PRN    meloxicam (MOBIC) tablet 15 mg  15 mg Oral Daily PRN    multivitamin stress formula tablet 1 tablet  1 tablet Oral Daily    nystatin (MYCOSTATIN) powder   Topical BID    OLANZapine (ZyPREXA) tablet 10 mg  10 mg Oral Q3H PRN Max 3/day    Or    OLANZapine (ZyPREXA) IM injection 10 mg  10 mg Intramuscular Q3H PRN Max 3/day    OLANZapine (ZyPREXA) tablet 5 mg  5 mg Oral Q3H PRN Max 6/day    Or    OLANZapine (ZyPREXA) IM injection 5 mg  5 mg Intramuscular Q3H PRN Max 6/day    OLANZapine (ZyPREXA) tablet 2.5 mg  2.5 mg Oral Q3H PRN Max 8/day    ondansetron (ZOFRAN-ODT) dispersible tablet 4 mg  4 mg Oral Q6H PRN    polyethylene glycol (MIRALAX) packet 17 g  17 g Oral Daily PRN    sertraline (ZOLOFT) tablet 100 mg  100 mg Oral Daily    traZODone (DESYREL) tablet 100 mg  100 mg Oral HS PRN    triamcinolone (KENALOG) 0.1 % cream   Topical BID PRN   .    Labs: I have personally reviewed all pertinent laboratory/tests results. CBC:   Lab Results   Component Value Date    WBC 6.01 03/21/2024    RBC 4.19 03/21/2024    HGB 12.2 03/21/2024    HCT 37.4 03/21/2024    MCV 89 03/21/2024     03/21/2024    MCH 29.1 03/21/2024    MCHC 32.6 03/21/2024    RDW 12.4 03/21/2024    MPV 11.5 03/21/2024    NEUTROABS 2.45 03/21/2024     CMP:   Lab Results   Component Value Date    SODIUM 140 03/21/2024    K 4.1 03/21/2024     03/21/2024    CO2 29 03/21/2024    AGAP 9 03/21/2024    BUN 13 03/21/2024    CREATININE  0.52 (L) 03/21/2024    GLUC 89 03/21/2024    GLUF 89 03/21/2024    CALCIUM 8.9 03/21/2024    AST 13 03/21/2024    ALT 14 03/21/2024    ALKPHOS 58 03/21/2024    TP 5.3 (L) 03/21/2024    ALB 3.4 (L) 03/21/2024    TBILI 0.22 03/21/2024    EGFR 116 03/21/2024     Depakote:   Lab Results   Component Value Date    VALPROICTOT 66 03/24/2024     EKG   Lab Results   Component Value Date    VENTRATE 70 03/21/2024    ATRIALRATE 70 03/21/2024    PRINT 164 03/21/2024    QRSDINT 112 03/21/2024    QTINT 416 03/21/2024    QTCINT 449 03/21/2024    PAXIS 10 03/21/2024    QRSAXIS 24 03/21/2024    TWAVEAXIS 60 03/21/2024       Counseling / Coordination of Care  Total floor / unit time spent today 25 minutes. Greater than 50% of total time was spent with the patient and / or family counseling and / or coordination of care. A description of the counseling / coordination of care: Medication education, treatment plan, safety planning

## 2024-03-27 NOTE — PROGRESS NOTES
03/27/24    Team Meeting   Meeting Type Daily Rounds   Team Members Present   Team Members Present Physician;Nurse;Occupational Therapist;   Physician Team Member Dr Haley CORONADO; Yeiosn BURR   Nursing Team Member Jesús MATTHEWS   Social Work Team Member Raymond LEON   OT Team Member Yessy GALEAS   Patient/Family Present   Patient Present No   Patient's Family Present No     No GI complaints today, incontinent of urine x2 overnight, at baseline, maintaining safety, mood controlled, lowered zoloft; DC Monday.

## 2024-03-27 NOTE — PLAN OF CARE
Problem: PHYSICAL THERAPY ADULT  Goal: Performs mobility at highest level of function for planned discharge setting.  See evaluation for individualized goals.  Description: Treatment/Interventions: Functional transfer training, Therapeutic exercise, Endurance training, Gait training, Bed mobility, Equipment eval/education  Equipment Recommended: Wheelchair       See flowsheet documentation for full assessment, interventions and recommendations.  Outcome: Progressing  Note: Prognosis: Fair  Problem List: Impaired balance, Decreased mobility, Decreased coordination, Decreased cognition, Impaired judgement, Decreased safety awareness  Assessment: Pt seen for PT treatment session this date with interventions consisting of gait training w/ emphasis on improving pt's ability to ambulate level surfaces x 150 ftx1 with close S provided by therapist with no AD and therapeutic activity consisting of training: bed mobility, supine<>sit transfers, and sit<>stand transfers. Pt agreeable to PT treatment session upon arrival, pt found seated OOB in chair, in no apparent distress and responsive. In comparison to previous session, pt with improvements in distance ambulated . Post session: all needs in reach and RN notified of session findings/recommendations. Continue to recommend Level III (Minimum Resource Intensity) at time of d/c in order to maximize pt's functional independence and safety w/ mobility. Pt continues to be functioning below baseline level. PT will continue to see pt during current hospitalization in order to address the deficits listed above and provide interventions consistent w/ POC in effort to achieve STGs.    Anny Will PTA  Barriers to Discharge: None     Rehab Resource Intensity Level, PT: III (Minimum Resource Intensity)    See flowsheet documentation for full assessment.

## 2024-03-27 NOTE — SOCIAL WORK
Guerline (Assistant Clinical Director/contact)  [phone] contacted regarding pt process, dc planning; vm left. Email sent to [email or web address]

## 2024-03-27 NOTE — PHYSICAL THERAPY NOTE
PHYSICAL THERAPY TREATMENT NOTE  NAME:  Yaritza Dunne  DATE: 03/27/24    Length Of Stay: 7  Performed at least 2 patient identifiers during session: Name and ID bracelet    TREATMENT:      03/27/24 1354   PT Last Visit   PT Visit Date 03/27/24   Note Type   Note Type Treatment   Pain Assessment   Pain Assessment Tool 0-10   Pain Score No Pain   Restrictions/Precautions   Weight Bearing Precautions Per Order No   Other Precautions Cognitive;Fall Risk   General   Chart Reviewed Yes   Family/Caregiver Present No   Cognition   Overall Cognitive Status Impaired   Arousal/Participation Alert;Cooperative   Attention Attends with cues to redirect   Memory Decreased recall of recent events   Following Commands Follows one step commands without difficulty   Comments pt agreeable to PT treatment   Bed Mobility   Supine to Sit 7  Independent   Sit to Supine 7  Independent   Transfers   Sit to Stand 6  Modified independent   Additional items Armrests;Increased time required   Stand to Sit 6  Modified independent   Additional items Armrests;Increased time required   Additional Comments pt denies dizziness with transitional movements   Ambulation/Elevation   Gait pattern Improper Weight shift;L Foot drag;Decreased foot clearance;Antalgic   Gait Assistance 5  Supervision   Additional items Verbal cues   Assistive Device None   Distance 150 ftx1   Ambulation/Elevation Additional Comments no gross LOB noted   Balance   Static Sitting Normal   Dynamic Sitting Good   Static Standing Fair   Dynamic Standing Fair -   Ambulatory Poor +   Endurance Deficit   Endurance Deficit No   Activity Tolerance   Activity Tolerance Patient tolerated treatment well   Assessment   Prognosis Fair   Problem List Impaired balance;Decreased mobility;Decreased coordination;Decreased cognition;Impaired judgement;Decreased safety awareness   Assessment Pt seen for PT treatment session this date with interventions consisting of gait training w/ emphasis on  improving pt's ability to ambulate level surfaces x 150 ftx1 with close S provided by therapist with no AD and therapeutic activity consisting of training: bed mobility, supine<>sit transfers, and sit<>stand transfers. Pt agreeable to PT treatment session upon arrival, pt found seated OOB in chair, in no apparent distress and responsive. In comparison to previous session, pt with improvements in distance ambulated . Post session: all needs in reach and RN notified of session findings/recommendations. Continue to recommend Level III (Minimum Resource Intensity) at time of d/c in order to maximize pt's functional independence and safety w/ mobility. Pt continues to be functioning below baseline level. PT will continue to see pt during current hospitalization in order to address the deficits listed above and provide interventions consistent w/ POC in effort to achieve STGs.    Anny Will, JUNG   Barriers to Discharge None   Goals   Patient Goals to get a w/c   LTG Expiration Date 04/05/24   Plan   Treatment/Interventions Functional transfer training;LE strengthening/ROM;Therapeutic exercise;Endurance training;Gait training;Equipment eval/education   PT Frequency 2-3x/wk   Discharge Recommendation   Rehab Resource Intensity Level, PT III (Minimum Resource Intensity)   AM-PAC Basic Mobility Inpatient   Turning in Flat Bed Without Bedrails 4   Lying on Back to Sitting on Edge of Flat Bed Without Bedrails 4   Moving Bed to Chair 4   Standing Up From Chair Using Arms 4   Walk in Room 4   Climb 3-5 Stairs With Railing 2   Basic Mobility Inpatient Raw Score 22   Basic Mobility Standardized Score 47.4   [facility] Highest Level Of Mobility   -HLM Goal 7: Walk 25 feet or more   -HLM Achieved 7: Walk 25 feet or more   End of Consult   Patient Position at End of Consult Seated edge of bed;All needs within reach         The patient's AM-PAC Basic Mobility Inpatient Short Form Raw Score is 22. A Raw score of greater  than 16 suggests the patient may benefit from discharge to home. Please also refer to the recommendation of the Physical Therapist for safe discharge planning.      Anny Will, PTA,PTA

## 2024-03-27 NOTE — OCCUPATIONAL THERAPY NOTE
"  Occupational Therapy Evaluation     Patient Name: Yaritza Dunne  Today's Date: 3/27/2024  Problem List  Principal Problem:    Bipolar 1 disorder, mixed, severe (HCC)    Past Medical History  Past Medical History:   Diagnosis Date    Adrenocortical insufficiency (HCC)     Bipolar disorder (HCC)     CVA (cerebral vascular accident) (HCC)     Disease of thyroid gland     hypo    Disorder of bone density and structure, unspecified     Epilepsy (HCC)     Hyperlipidemia     Hypopituitarism (HCC)     Mild intellectual disabilities     Psychiatric disorder     bipolar     Past Surgical History  Past Surgical History:   Procedure Laterality Date    EPIDURAL BLOCK INJECTION N/A 8/23/2023    Procedure: L3-L4  LUMBAR epidural steroid injection (05524);  Surgeon: Adam Hayden DO;  Location: [facility] MAIN OR;  Service: Pain Management         03/27/24 1340   OT Last Visit   OT Visit Date 03/27/24   Note Type   Note type Evaluation   Pain Assessment   Pain Assessment Tool 0-10   Pain Score No Pain   Restrictions/Precautions   Weight Bearing Precautions Per Order No   Other Precautions Cognitive;Fall Risk   Home Living   Type of Home Group Home   Home Layout One level  (0 [place])   Bathroom Shower/Tub Walk-in shower   Bathroom Toilet Standard   Bathroom Equipment Grab bars in shower;Grab bars around toilet   Bathroom Accessibility Accessible   Home Equipment Wheelchair-manual  (w/c for community transportation)   Prior Function   Level of [place] Independent with ADLs;Independent with functional mobility;Needs assistance with IADLS   Lives With Facility staff   Receives Help From Personal care attendant   IADLs Family/Friend/Other provides transportation;Family/Friend/Other provides meals;Family/Friend/Other provides medication management   Subjective   Subjective \"Can I get a wheelchair?\"   ADL   Where Assessed Edge of bed   UB Bathing Assistance 4  Minimal Assistance   LB Bathing Assistance 5  Supervision/Setup   UB " Dressing Assistance 4  Minimal Assistance   LB Dressing Assistance 5  Supervision/Setup   Bed Mobility   Supine to Sit 7  Independent   Sit to Supine 7  Independent   Transfers   Sit to Stand 6  Modified independent   Additional items Armrests;Increased time required   Stand to Sit 6  Modified independent   Additional items Armrests;Increased time required   Additional Comments denied dizziness with transitional movements   Functional Mobility   Functional Mobility 5  Supervision   Additional Comments Pt completed 150 ft functional mobility without device at S level. No significant LOB observed, moderate instability   Balance   Static Sitting Normal   Dynamic Sitting Good   Static Standing Fair   Dynamic Standing Fair -   Ambulatory Poor +   Activity Tolerance   Activity Tolerance Patient tolerated treatment well   Medical Staff Made Aware Yes, CM made aware of d/c recs   Nurse Made Aware Yes, nursing staff made aware of session outcomes   RUE Assessment   RUE Assessment WFL   RUE Strength   RUE Overall Strength   (4-/5)   LUE Assessment   LUE Assessment X  (~45* shoulder flexion; elbow WFL)   LUE Strength   LUE Overall Strength   (3/5)   Hand Function   Gross Motor Coordination Functional   Fine Motor Coordination Functional   Cognition   Overall Cognitive Status Impaired   Arousal/Participation Alert;Responsive;Cooperative   Attention Attends with cues to redirect   Orientation Level Oriented X4   Memory Decreased recall of recent events   Following Commands Follows one step commands without difficulty   Comments Pt agreeable to OT evaluation   Assessment   Limitation Decreased ADL status;Decreased UE strength;Decreased Safe judgement during ADL;Decreased endurance;Decreased self-care trans;Decreased high-level ADLs;Decreased UE ROM;Decreased cognition   Prognosis Good   Assessment Pt is a 44 y.o. female seen for OT evaluation s/p admit to [facility] on 3/20/2024 w/ Bipolar 1 disorder, mixed, severe (HCC).   Comorbidities affecting pt's functional performance at time of assessment include: lumbar radiculopathy, closed pilon fx of L tibia, ID, intgermittent explosive disorder, epilepsy, edema. Personal factors affecting pt at time of IE include:behavioral pattern, difficulty performing ADLS, difficulty performing IADLS , limited insight into deficits, decreased initiation and engagement , and health management . Prior to admission, pt was I with Adls and required A with IADLs. Upon evaluation: the following deficits impact occupational performance: weakness, decreased strength, decreased balance, decreased tolerance, impaired problem solving, decreased safety awareness, and increased pain. Pt to benefit from continued skilled OT tx while in the hospital to address deficits as defined above and maximize level of functional independence w ADL's and functional mobility. Occupational Performance areas to address include: grooming, bathing/shower, toilet hygiene, dressing, functional mobility, community mobility, and clothing management. From OT standpoint, recommendation at time of d/c would with minimal intensity OT resources.   Goals   Patient Goals to go home   Plan   Treatment Interventions ADL retraining;Functional transfer training;UE strengthening/ROM;Endurance training;Patient/family training;Compensatory technique education;Energy conservation;Activityengagement   Goal Expiration Date 04/16/24   OT Treatment Day 0   OT Frequency   (1-5x)   Discharge Recommendation   Rehab Resource Intensity Level, OT III (Minimum Resource Intensity)   Additional Comments  The patient's raw score on the AM-PAC Daily Activity inpatient short form is 20, standardized score is 42.03, greater than 39.4. Patients at this level are likely to benefit from discharge with minimal intensity OT resources. Please refer to the recommendation of the Occupational Therapist for safe discharge planning.   Additional Comments 2 coordination of care  with PTA Anny   AM-PAC Daily Activity Inpatient   Lower Body Dressing 3   Bathing 3   Toileting 3   Upper Body Dressing 3   Grooming 4   Eating 4   Daily Activity Raw Score 20   Daily Activity Standardized Score (Calc for Raw Score >=11) 42.03   AM-PAC Applied Cognition Inpatient   Following a Speech/Presentation 3   Understanding Ordinary Conversation 4   Taking Medications 3   Remembering Where Things Are Placed or Put Away 2   Remembering List of 4-5 Errands 2   Taking Care of Complicated Tasks 2   Applied Cognition Raw Score 16   Applied Cognition Standardized Score 35.03     GOALS:    Pt will achieve the following within specified time frame: LTG  Pt will achieve the following goals within 20 days    *ADL transfers with (I) for inc'd independence with ADLs/purposeful tasks    *UB ADL with (I) for inc'd independence with self cares    *LB ADL with (I) using AE prn for inc'd independence with self cares    *Toileting with (I) for clothing management and hygiene for return to PLOF with personal care    *Increase static stand balance and dyn stand balance to G for inc'd safety with standing purposeful tasks    *Increase stand tolerance x7 m for inc'd tolerance with standing purposeful tasks    *Bed mobility- (I) for inc'd independence to manage own comfort and initiate EOB & OOB purposeful tasks    *Participate in 10-15m UE therex to increase overall stamina/activity tolerance for purposeful tasks      Raina Reardon MS, OTR/L

## 2024-03-28 LAB
T4 FREE SERPL-MCNC: 1.1 NG/DL (ref 0.61–1.12)
TSH SERPL DL<=0.05 MIU/L-ACNC: <0.01 UIU/ML (ref 0.45–4.5)

## 2024-03-28 PROCEDURE — 99232 SBSQ HOSP IP/OBS MODERATE 35: CPT | Performed by: HOSPITALIST

## 2024-03-28 PROCEDURE — 84443 ASSAY THYROID STIM HORMONE: CPT

## 2024-03-28 PROCEDURE — 84439 ASSAY OF FREE THYROXINE: CPT

## 2024-03-28 RX ORDER — NITROFURANTOIN 25; 75 MG/1; MG/1
100 CAPSULE ORAL 2 TIMES DAILY WITH MEALS
Status: DISCONTINUED | OUTPATIENT
Start: 2024-03-28 | End: 2024-04-01 | Stop reason: HOSPADM

## 2024-03-28 RX ADMIN — SERTRALINE 100 MG: 100 TABLET, FILM COATED ORAL at 09:25

## 2024-03-28 RX ADMIN — B-COMPLEX W/ C & FOLIC ACID TAB 1 TABLET: TAB at 09:25

## 2024-03-28 RX ADMIN — Medication 2000 UNITS: at 09:25

## 2024-03-28 RX ADMIN — Medication 1 TABLET: at 16:54

## 2024-03-28 RX ADMIN — Medication 400 MG: at 18:13

## 2024-03-28 RX ADMIN — FUROSEMIDE 40 MG: 40 TABLET ORAL at 09:36

## 2024-03-28 RX ADMIN — OMEGA-3 FATTY ACIDS CAP 1000 MG 1000 MG: 1000 CAP at 09:25

## 2024-03-28 RX ADMIN — CLONAZEPAM 0.5 MG: 0.5 TABLET ORAL at 09:24

## 2024-03-28 RX ADMIN — LEVOTHYROXINE SODIUM 200 MCG: 0.1 TABLET ORAL at 06:12

## 2024-03-28 RX ADMIN — Medication 1 TABLET: at 06:10

## 2024-03-28 RX ADMIN — OMEGA-3 FATTY ACIDS CAP 1000 MG 1000 MG: 1000 CAP at 21:11

## 2024-03-28 RX ADMIN — DOCUSATE SODIUM 100 MG: 100 CAPSULE, LIQUID FILLED ORAL at 09:25

## 2024-03-28 RX ADMIN — ARIPIPRAZOLE 15 MG: 15 TABLET ORAL at 09:24

## 2024-03-28 RX ADMIN — CLONAZEPAM 0.5 MG: 0.5 TABLET ORAL at 18:13

## 2024-03-28 RX ADMIN — NYSTATIN: 100000 POWDER TOPICAL at 18:13

## 2024-03-28 RX ADMIN — Medication 400 MG: at 09:25

## 2024-03-28 RX ADMIN — NITROFURANTOIN (MONOHYDRATE/MACROCRYSTALS) 100 MG: 75; 25 CAPSULE ORAL at 16:54

## 2024-03-28 RX ADMIN — OMEGA-3 FATTY ACIDS CAP 1000 MG 1000 MG: 1000 CAP at 16:54

## 2024-03-28 RX ADMIN — DIVALPROEX SODIUM 1500 MG: 500 TABLET, FILM COATED, EXTENDED RELEASE ORAL at 21:11

## 2024-03-28 RX ADMIN — HYDROCORTISONE 15 MG: 10 TABLET ORAL at 09:24

## 2024-03-28 RX ADMIN — CYANOCOBALAMIN TAB 500 MCG 1000 MCG: 500 TAB at 09:24

## 2024-03-28 NOTE — PROGRESS NOTES
03/28/24   Team Meeting   Meeting Type Daily Rounds   Team Members Present   Team Members Present Physician;Nurse;Occupational Therapist;   Physician Team Member Dr Haley CORONADO; Yeison BURR   Nursing Team Member Jonathon MATTHEWS   Social Work Team Member Raymond LEON   OT Team Member Yessy GALEAS   Patient/Family Present          Jodi Tidwell   Patient Present No   Patient's Family Present No     Dc Monday, dc meeting tomorrow with mother/GH; child like, minimal, close to baseline, incontinent of urine-UA pending.

## 2024-03-28 NOTE — PROGRESS NOTES
"Progress Note - Yaritza Dunne 44 y.o. female MRN: [id number]    Unit/Bed#: [facility] 256-02 Encounter: [id number]        Subjective:   Patient seen and examined at bedside after reviewing the chart and discussing the case with the caring staff.      Patient examined at bedside.  Patient continues to complain of burning with urination. Patient was incontinent of urine overnight. Patient also states her callus on left foot is hurting. She follows with podiatry outpatient.     UA 3/27 shows moderate bacteria, trace leukocytes, trace blood. Pt symptomatic.    Patient is being discharged Monday, 4/1/24.     Physical Exam   Vitals: Blood pressure 112/88, pulse 97, temperature 97.6 °F (36.4 °C), temperature source Temporal, resp. rate 18, height 5' 8\" (1.727 m), weight 129 kg (284 lb 12.8 oz), SpO2 98%.,Body mass index is 43.3 kg/m².  Constitutional: Patient in no acute distress.  HEENT: PERR, EOMI, MMM.  Cardiovascular: Normal rate and regular rhythm.    Pulmonary/Chest: Effort normal and breath sounds normal.   Abdomen: Soft, + BS, NT.  Skin: BLE edema.     Assessment/Plan:  Yaritza Dunne is a(n) 43 y.o. female with bipolar disorder.      Hypothyroidism.  Home:  levothyroxine 224 mcg M-F and 336 mcg on S/Sn.  TSH level <0.010, FT4 0.95 on 3/21/24.  Decrease levothyroxine 224 to 200 mcg daily.  Repeat TSH in 1 week on 3/28/24.  Adrenocortical insufficiency.  Patient is on hydrocortisone 15 mg daily.  Epilepsy.  Continue Depakote nightly.  Dyslipidemia.  Patient is on fish oil 1 mg three times daily.  Lower extremity edema.  Lasix 20 mg daily. Increase lasix to 40 mg daily 3/24. Continue to monitor.   Hypomagnesemia.  Continue mag oxide 400 mg twice daily.   Vitamin D deficiency.  Patient is on vitamin D3 2000 units daily.  Vitamin B12 deficiency.  Patient is on vitamin B12 1000 mcg daily.  Intellectual disability.  Supportive therapy.   Prediabetes.  Hgb A1c 6.0% on 3/21/24.  Lifestyle modifications.   Left anterior neck " abrasions.  Self inflicted.  Apply Bactroban three times daily.   Left wrist and ankle pain.  Had x-rays 3/20/24.  Left wrist x-ray showing no fracture or malalignment but remains skeletally immature.  Left ankle x-ray with no acute abnormality but has chronic deformity of ankle and foot as described with arthritic changes at the tibiotalar joint and prominent swelling.  Tylenol as needed. Lidocaine patch daily.  Apply ice as needed. PT OT consulted.    Dry eyes.  Start artifical tear drops twice daily.  Allergic Rhinitis. Start Flonase daily.   Cutaneous candidiasis. Apply Nystatin powder to affected areas twice daily.  Yeast infection. Diflucan 150 mg x 1 dose 3/27.   UTI. Start Macrobid 100 mg twice daily x 5 days (thru 4/1).   Callus. Patient states she does see podiatry outpatient. They cut her nails. Encouraged to f/u with them.     The patient was discussed with Dr. Arguelles and he is in agreement with the above note.

## 2024-03-28 NOTE — SOCIAL WORK
Pt agreeable and excited for Monday DC, agreeable to briefly attend dc planning meeting tomorrow 11am with mother and GH. Pt presents calm, cooperative, pleasant.

## 2024-03-28 NOTE — PROGRESS NOTES
"Progress Note - Behavioral Health   Yaritza Dunne 44 y.o. female MRN: [id number]  Unit/Bed#: [facility] 256-02 Encounter: [id number]    Assessment/Plan   Principal Problem:    Bipolar 1 disorder, mixed, severe (HCC)      Behavior over the last 24 hours: Improving  Sleep: normal  Appetite: normal  Medication side effects: No  ROS:  Urinary frequency, urgency, and burning and all other systems are negative    Yaritza continues to maintain mood control with no behavioral outbursts or threats SIB/SI.  Maintaining safety and denies suicidal ideation.  Behavior is childlike with concrete thought process.  Often carries baby doll for coping skill.  Denies AVH, nor did patient appear preoccupied.  Has been compliant with medications and sleeping undisturbed throughout the night.  Identifies talking to her staff at home a safety plan should she experience a mental health crisis or feel unsafe    Mental Status Evaluation:  Appearance:  Tall, marginal hygiene, casually dressed, lying in bed, braided hair   Behavior:  Childlike cooperative, redirectable   Speech:  Mumbled   Mood:  \"Good\"   Affect:  constricted, mood-congruent, and redirectable   Thought Process:  concrete   Associations: concrete associations   Thought Content:  No overt delusions or paranoia verbalized   Perceptual Disturbances: Denies AVH, did not appear internally preoccupied   Risk Potential: Suicidal Ideations none  Homicidal Ideations none  Potential for Aggression No   Sensorium:  person, place, and time/date   Memory:  recent and remote memory grossly intact   Consciousness:  alert and awake    Attention: decreased attention/concentration   Insight:  Impaired, history of ID   Judgment: Impaired, history of ID   Gait/Station: slow   Motor Activity: no abnormal movements     Progress Toward Goals: Continues to maintain mood control and safety.  Childlike and cooperative.  Responds well to redirection.  Utilizing coping skills appropriately with no utilization of PRN " anxiolytic or antipsychotics.  Appears to be approaching psychiatric baseline.  Will continue with current psychotropic regimen with plan to discharge home 4/1/24.    Recommended Treatment: Continue with group therapy, milieu therapy and occupational therapy.      Risks, benefits and possible side effects of Medications:   Yaritza has limited understanding of risk versus benefits of medications, but agrees to take as prescribed.    Medications: all current active meds have been reviewed, continue current psychiatric medications, and current meds:   Current Facility-Administered Medications   Medication Dose Route Frequency    acetaminophen (TYLENOL) tablet 650 mg  650 mg Oral Q6H PRN    acetaminophen (TYLENOL) tablet 650 mg  650 mg Oral Q4H PRN    acetaminophen (TYLENOL) tablet 975 mg  975 mg Oral Q6H PRN    albuterol (PROVENTIL HFA,VENTOLIN HFA) inhaler 2 puff  2 puff Inhalation Q6H PRN    aluminum-magnesium hydroxide-simethicone (MAALOX) oral suspension 30 mL  30 mL Oral Q4H PRN    ARIPiprazole (ABILIFY) tablet 15 mg  15 mg Oral Daily    Artificial Tears ophthalmic solution 1 drop  1 drop Both Eyes Q6H PRN    benztropine (COGENTIN) tablet 1 mg  1 mg Oral Q6H PRN    calcium carbonate-vitamin D 500 mg-5 mcg tablet 1 tablet  1 tablet Oral BID With Meals    cholecalciferol (VITAMIN D3) tablet 2,000 Units  2,000 Units Oral Daily    clonazePAM (KlonoPIN) tablet 0.5 mg  0.5 mg Oral BID    cyanocobalamin (VITAMIN B-12) tablet 1,000 mcg  1,000 mcg Oral Daily    divalproex sodium (DEPAKOTE ER) 24 hr tablet 1,500 mg  1,500 mg Oral HS    docusate sodium (COLACE) capsule 100 mg  100 mg Oral Daily    fish oil capsule 1,000 mg  1,000 mg Oral TID    fluticasone (FLONASE) 50 mcg/act nasal spray 1 spray  1 spray Each Nare Daily    furosemide (LASIX) tablet 40 mg  40 mg Oral Daily    hydrocortisone (CORTEF) tablet 15 mg  15 mg Oral Daily    hydrOXYzine HCL (ATARAX) tablet 25 mg  25 mg Oral Q6H PRN Max 4/day    hydrOXYzine HCL (ATARAX)  tablet 50 mg  50 mg Oral Q4H PRN Max 4/day    Or    LORazepam (ATIVAN) injection 1 mg  1 mg Intramuscular Q4H PRN    levothyroxine tablet 200 mcg  200 mcg Oral Once per day on Monday Tuesday Wednesday Thursday Friday    levothyroxine tablet 336 mcg  336 mcg Oral Once per day on Sunday Saturday    lidocaine (LIDODERM) 5 % patch 1 patch  1 patch Topical Daily    loperamide (IMODIUM) capsule 2 mg  2 mg Oral 4x Daily PRN    LORazepam (ATIVAN) tablet 1 mg  1 mg Oral Q6H PRN    Or    LORazepam (ATIVAN) injection 2 mg  2 mg Intramuscular Q6H PRN Max 3/day    magnesium Oxide (MAG-OX) tablet 400 mg  400 mg Oral BID    meclizine (ANTIVERT) tablet 25 mg  25 mg Oral Q12H PRN    meloxicam (MOBIC) tablet 15 mg  15 mg Oral Daily PRN    multivitamin stress formula tablet 1 tablet  1 tablet Oral Daily    nystatin (MYCOSTATIN) powder   Topical BID    OLANZapine (ZyPREXA) tablet 10 mg  10 mg Oral Q3H PRN Max 3/day    Or    OLANZapine (ZyPREXA) IM injection 10 mg  10 mg Intramuscular Q3H PRN Max 3/day    OLANZapine (ZyPREXA) tablet 5 mg  5 mg Oral Q3H PRN Max 6/day    Or    OLANZapine (ZyPREXA) IM injection 5 mg  5 mg Intramuscular Q3H PRN Max 6/day    OLANZapine (ZyPREXA) tablet 2.5 mg  2.5 mg Oral Q3H PRN Max 8/day    ondansetron (ZOFRAN-ODT) dispersible tablet 4 mg  4 mg Oral Q6H PRN    polyethylene glycol (MIRALAX) packet 17 g  17 g Oral Daily PRN    sertraline (ZOLOFT) tablet 100 mg  100 mg Oral Daily    traZODone (DESYREL) tablet 100 mg  100 mg Oral HS PRN    triamcinolone (KENALOG) 0.1 % cream   Topical BID PRN   .    Labs: I have personally reviewed all pertinent laboratory/tests results. CBC:   Lab Results   Component Value Date    WBC 6.01 03/21/2024    RBC 4.19 03/21/2024    HGB 12.2 03/21/2024    HCT 37.4 03/21/2024    MCV 89 03/21/2024     03/21/2024    MCH 29.1 03/21/2024    MCHC 32.6 03/21/2024    RDW 12.4 03/21/2024    MPV 11.5 03/21/2024    NEUTROABS 2.45 03/21/2024     CMP:   Lab Results   Component Value Date     SODIUM 140 03/21/2024    K 4.1 03/21/2024     03/21/2024    CO2 29 03/21/2024    AGAP 9 03/21/2024    BUN 13 03/21/2024    CREATININE 0.52 (L) 03/21/2024    GLUC 89 03/21/2024    GLUF 89 03/21/2024    CALCIUM 8.9 03/21/2024    AST 13 03/21/2024    ALT 14 03/21/2024    ALKPHOS 58 03/21/2024    TP 5.3 (L) 03/21/2024    ALB 3.4 (L) 03/21/2024    TBILI 0.22 03/21/2024    EGFR 116 03/21/2024     Depakote:   Lab Results   Component Value Date    VALPROICTOT 66 03/24/2024     Drug Screen:   Lab Results   Component Value Date    AMPMETHUR Negative 03/19/2024    BARBTUR Negative 03/19/2024    BDZUR Negative 03/19/2024    THCUR Negative 03/19/2024    COCAINEUR Negative 03/19/2024    METHADONEUR Negative 03/19/2024    OPIATEUR Negative 03/19/2024    PCPUR Negative 03/19/2024     Urinalysis   Lab Results   Component Value Date    COLORU Yellow 03/27/2024    CLARITYU Hazy 03/27/2024    SPECGRAV 1.010 03/27/2024    PHUR 6.0 03/27/2024    LEUKOCYTESUR 1+ (A) 03/27/2024    NITRITE Negative 03/27/2024    PROTEIN UA Negative 03/27/2024    GLUCOSEU Negative 03/27/2024    KETONESU Negative 03/27/2024    UROBILINOGEN 0.2 03/27/2024    BILIRUBINUR Negative 03/27/2024    BLOODU Trace-Intact (A) 03/27/2024    RBCUA 0-1 (A) 03/27/2024    WBCUA 1-2 (A) 03/27/2024    EPIS Occasional 03/27/2024    BACTERIA Moderate (A) 03/27/2024     EKG   Lab Results   Component Value Date    VENTRATE 70 03/21/2024    ATRIALRATE 70 03/21/2024    PRINT 164 03/21/2024    QRSDINT 112 03/21/2024    QTINT 416 03/21/2024    QTCINT 449 03/21/2024    PAXIS 10 03/21/2024    QRSAXIS 24 03/21/2024    TWAVEAXIS 60 03/21/2024       Counseling / Coordination of Care  Total floor / unit time spent today 30 minutes. Greater than 50% of total time was spent with the patient and / or family counseling and / or coordination of care. A description of the counseling / coordination of care: Medication education, treatment plan, safety/discharge planning

## 2024-03-29 PROCEDURE — 99232 SBSQ HOSP IP/OBS MODERATE 35: CPT | Performed by: NURSE PRACTITIONER

## 2024-03-29 RX ORDER — LANOLIN ALCOHOL/MO/W.PET/CERES
400 CREAM (GRAM) TOPICAL 2 TIMES DAILY
Qty: 60 TABLET | Refills: 0 | Status: SHIPPED | OUTPATIENT
Start: 2024-03-29 | End: 2024-04-28

## 2024-03-29 RX ORDER — CLONAZEPAM 0.5 MG/1
0.5 TABLET ORAL 2 TIMES DAILY
Qty: 20 TABLET | Refills: 2 | Status: SHIPPED | OUTPATIENT
Start: 2024-03-29 | End: 2024-04-28

## 2024-03-29 RX ORDER — FUROSEMIDE 40 MG/1
40 TABLET ORAL DAILY
Qty: 30 TABLET | Refills: 0 | Status: SHIPPED | OUTPATIENT
Start: 2024-03-30

## 2024-03-29 RX ORDER — LEVOTHYROXINE SODIUM 112 UG/1
336 TABLET ORAL 2 TIMES WEEKLY
Qty: 30 TABLET | Refills: 0 | Status: SHIPPED | OUTPATIENT
Start: 2024-04-01

## 2024-03-29 RX ORDER — SERTRALINE HYDROCHLORIDE 100 MG/1
100 TABLET, FILM COATED ORAL DAILY
Qty: 30 TABLET | Refills: 0 | Status: SHIPPED | OUTPATIENT
Start: 2024-03-30 | End: 2024-04-29

## 2024-03-29 RX ORDER — MULTIVITAMIN
1 TABLET ORAL DAILY
Qty: 30 TABLET | Refills: 0 | Status: SHIPPED | OUTPATIENT
Start: 2024-03-29

## 2024-03-29 RX ORDER — ARIPIPRAZOLE 15 MG/1
15 TABLET ORAL DAILY
Qty: 30 TABLET | Refills: 0 | Status: SHIPPED | OUTPATIENT
Start: 2024-03-29 | End: 2024-04-28

## 2024-03-29 RX ORDER — DIVALPROEX SODIUM 500 MG/1
1500 TABLET, EXTENDED RELEASE ORAL
Qty: 90 TABLET | Refills: 0 | Status: SHIPPED | OUTPATIENT
Start: 2024-03-29 | End: 2024-04-28

## 2024-03-29 RX ORDER — TRIAMCINOLONE ACETONIDE 1 MG/G
CREAM TOPICAL 2 TIMES DAILY PRN
Qty: 15 G | Refills: 0 | Status: SHIPPED | OUTPATIENT
Start: 2024-03-29

## 2024-03-29 RX ORDER — MELOXICAM 15 MG/1
15 TABLET ORAL DAILY PRN
Qty: 30 TABLET | Refills: 0 | Status: SHIPPED | OUTPATIENT
Start: 2024-03-29

## 2024-03-29 RX ORDER — NITROFURANTOIN 25; 75 MG/1; MG/1
100 CAPSULE ORAL 2 TIMES DAILY WITH MEALS
Qty: 8 CAPSULE | Refills: 0 | Status: SHIPPED | OUTPATIENT
Start: 2024-03-29 | End: 2024-04-02

## 2024-03-29 RX ORDER — CHLORAL HYDRATE 500 MG
1000 CAPSULE ORAL 3 TIMES DAILY
Qty: 90 CAPSULE | Refills: 0 | Status: SHIPPED | OUTPATIENT
Start: 2024-03-29

## 2024-03-29 RX ORDER — MECLIZINE HYDROCHLORIDE 25 MG/1
25 TABLET ORAL EVERY 12 HOURS PRN
Qty: 30 TABLET | Refills: 0 | Status: SHIPPED | OUTPATIENT
Start: 2024-03-29

## 2024-03-29 RX ORDER — DOCUSATE SODIUM 100 MG/1
100 CAPSULE, LIQUID FILLED ORAL DAILY
Qty: 30 CAPSULE | Refills: 0 | Status: SHIPPED | OUTPATIENT
Start: 2024-03-29

## 2024-03-29 RX ORDER — LANOLIN ALCOHOL/MO/W.PET/CERES
1 CREAM (GRAM) TOPICAL 2 TIMES DAILY WITH MEALS
Qty: 60 TABLET | Refills: 0 | Status: SHIPPED | OUTPATIENT
Start: 2024-03-29 | End: 2024-04-28

## 2024-03-29 RX ORDER — HYDROCORTISONE 5 MG/1
15 TABLET ORAL DAILY
Qty: 90 TABLET | Refills: 0 | Status: SHIPPED | OUTPATIENT
Start: 2024-03-30

## 2024-03-29 RX ORDER — LEVOTHYROXINE SODIUM 0.2 MG/1
200 TABLET ORAL DAILY
Qty: 30 TABLET | Refills: 0 | Status: SHIPPED | OUTPATIENT
Start: 2024-03-29

## 2024-03-29 RX ORDER — NYSTATIN 100000 [USP'U]/G
POWDER TOPICAL 2 TIMES DAILY
Qty: 30 G | Refills: 0 | Status: SHIPPED | OUTPATIENT
Start: 2024-03-29

## 2024-03-29 RX ORDER — CHOLECALCIFEROL (VITAMIN D3) 50 MCG
2000 TABLET ORAL DAILY
Qty: 30 TABLET | Refills: 0 | Status: SHIPPED | OUTPATIENT
Start: 2024-03-29

## 2024-03-29 RX ORDER — LIDOCAINE 50 MG/G
1 PATCH TOPICAL DAILY PRN
Qty: 30 PATCH | Refills: 0 | Status: SHIPPED | OUTPATIENT
Start: 2024-03-29

## 2024-03-29 RX ADMIN — DIVALPROEX SODIUM 1500 MG: 500 TABLET, FILM COATED, EXTENDED RELEASE ORAL at 21:15

## 2024-03-29 RX ADMIN — Medication 400 MG: at 17:54

## 2024-03-29 RX ADMIN — LIDOCAINE 5% 1 PATCH: 700 PATCH TOPICAL at 09:12

## 2024-03-29 RX ADMIN — FLUTICASONE PROPIONATE 1 SPRAY: 50 SPRAY, METERED NASAL at 09:13

## 2024-03-29 RX ADMIN — Medication 1 TABLET: at 17:52

## 2024-03-29 RX ADMIN — Medication 400 MG: at 09:09

## 2024-03-29 RX ADMIN — CYANOCOBALAMIN TAB 500 MCG 1000 MCG: 500 TAB at 09:09

## 2024-03-29 RX ADMIN — NYSTATIN: 100000 POWDER TOPICAL at 18:00

## 2024-03-29 RX ADMIN — OMEGA-3 FATTY ACIDS CAP 1000 MG 1000 MG: 1000 CAP at 09:09

## 2024-03-29 RX ADMIN — B-COMPLEX W/ C & FOLIC ACID TAB 1 TABLET: TAB at 09:09

## 2024-03-29 RX ADMIN — HYDROCORTISONE 15 MG: 10 TABLET ORAL at 09:09

## 2024-03-29 RX ADMIN — OMEGA-3 FATTY ACIDS CAP 1000 MG 1000 MG: 1000 CAP at 21:16

## 2024-03-29 RX ADMIN — CLONAZEPAM 0.5 MG: 0.5 TABLET ORAL at 09:09

## 2024-03-29 RX ADMIN — FUROSEMIDE 40 MG: 40 TABLET ORAL at 09:09

## 2024-03-29 RX ADMIN — ARIPIPRAZOLE 15 MG: 15 TABLET ORAL at 09:09

## 2024-03-29 RX ADMIN — Medication 2000 UNITS: at 09:09

## 2024-03-29 RX ADMIN — LEVOTHYROXINE SODIUM 200 MCG: 0.1 TABLET ORAL at 05:07

## 2024-03-29 RX ADMIN — NYSTATIN: 100000 POWDER TOPICAL at 09:13

## 2024-03-29 RX ADMIN — CLONAZEPAM 0.5 MG: 0.5 TABLET ORAL at 17:54

## 2024-03-29 RX ADMIN — ACETAMINOPHEN 975 MG: 325 TABLET ORAL at 21:16

## 2024-03-29 RX ADMIN — NITROFURANTOIN (MONOHYDRATE/MACROCRYSTALS) 100 MG: 75; 25 CAPSULE ORAL at 17:52

## 2024-03-29 RX ADMIN — NITROFURANTOIN (MONOHYDRATE/MACROCRYSTALS) 100 MG: 75; 25 CAPSULE ORAL at 09:12

## 2024-03-29 RX ADMIN — Medication 1 TABLET: at 09:12

## 2024-03-29 RX ADMIN — DOCUSATE SODIUM 100 MG: 100 CAPSULE, LIQUID FILLED ORAL at 09:10

## 2024-03-29 RX ADMIN — OMEGA-3 FATTY ACIDS CAP 1000 MG 1000 MG: 1000 CAP at 17:52

## 2024-03-29 RX ADMIN — SERTRALINE 100 MG: 100 TABLET, FILM COATED ORAL at 09:09

## 2024-03-29 NOTE — DISCHARGE INSTR - OTHER ORDERS
You are being discharged to [facility] located at [address] [place] [id number] P: [phone].     Triggers you have identified during your hospitalization that led to your admission of a distressed mood includes ineffective coping skills and relationship stressors. Coping skills you have identified during your hospitalization include music and art therapy. If you are unable to deal with your distressed mood alone please contact Kiley Dunne (Mother/Legal Guardian) [phone] or [phone] . If that is not effective and you continue to have a distressed mood, are overwhelmed, or in crisis, please contact (Crisis #) [facility] Crisis Hotline: 107.434.5321 or [facility] , dial 916 or go to the nearest emergency center.      *[facility] Hotline: 724.152.4571  *National Suicide Prevention Lifeline:  1-395.170.8720  *Alcohol Anonymous: 781.950.8459  *[facility] Drug & Alcohol Commission: (108) 820-9566  *National [place] on Mental Illness (ZOHAIB) HELPLINE: 749.865.4439/Website: www.zohaib.org  *Substance Abuse and Mental Health Services Administration([facility]) National Helpline, which is a confidential, free, 24-hour-a-day, 365-day-a-year, information service for individuals and family members facing mental health and/or substance use disorders. This service provides referrals to local treatment facilities, support groups, and community-based organizations. Callers can also order free publications and other information.  Call 1-990.800.5880/Website: www.[facility].gov  *United Way 2-1-1: This is a toll free, confidential, 24-hour-a-day service which connects you to a community  in your area who can help you find services and resources that are available to you locally and provide critical services that can improve and save lives.  Call: 211  /Website: http://www.[employer].org/     You declined the need for a primary care  provider appointment at this time.     Grace, or Hilda, our Behavioral Health Nurse Navigators, will be calling you after your discharge, on the phone number that you provided.  They will be available as an additional support, if needed.   If you wish to speak with one of them, you may contact Grace at [phone] or Hilda at [phone].

## 2024-03-29 NOTE — PROGRESS NOTES
03/29/24   Team Meeting   Meeting Type Daily Rounds   Team Members Present   Team Members Present Physician;Nurse;;Occupational Therapist   Physician Team Member Dr Haley CORONADO; Yeison BURR   Nursing Team Member Briseida MATTHEWS   Social Work Team Member Raymond LEON   OT Team Member Yessy GALEAS   Patient/Family Present   Patient Present No   Patient's Family Present No     Dc meeting today 11am, DC Monday, slept.

## 2024-03-29 NOTE — PROGRESS NOTES
"Progress Note - Yaritza Dunne 44 y.o. female MRN: [id number]    Unit/Bed#: [facility] 256-02 Encounter: [id number]        Subjective:   Patient seen and examined at bedside after reviewing the chart and discussing the case with the caring staff.      Patient examined at bedside.  Patient has no acute symptoms.     Repeat TSH <0.010, TF4 1.10 3/28/24.   Patient is being discharged Monday, 4/1/24.     Physical Exam   Vitals: Blood pressure 90/58, pulse 73, temperature 98.2 °F (36.8 °C), temperature source Temporal, resp. rate 18, height 5' 8\" (1.727 m), weight 129 kg (284 lb 12.8 oz), SpO2 93%.,Body mass index is 43.3 kg/m².  Constitutional: Patient in no acute distress.  HEENT: PERR, EOMI, MMM.  Cardiovascular: Normal rate and regular rhythm.    Pulmonary/Chest: Effort normal and breath sounds normal.   Abdomen: Soft, + BS, NT.  Skin: BLE edema.     Assessment/Plan:  Yaritza Dunne is a(n) 43 y.o. female with bipolar disorder.     Medical Clearance: Patient is medically cleared for discharge. All prescriptions have been sent to pharmacy.      Hypothyroidism.  Home:  levothyroxine 224 mcg M-F and 336 mcg on S/Sn.  TSH level <0.010, FT4 0.95 on 3/21/24.  Decrease levothyroxine 224 to 200 mcg daily. Repeat TSH <0.010, FT4 1.10 on 3/28/24. F/u with endocrinology outpt. Repeat labs.   Adrenocortical insufficiency.  Patient is on hydrocortisone 15 mg daily.  Epilepsy.  Continue Depakote nightly.  Dyslipidemia.  Patient is on fish oil 1 mg three times daily.  Lower extremity edema.  Lasix 20 mg daily. Increase lasix to 40 mg daily 3/24. Continue to monitor.   Hypomagnesemia.  Continue mag oxide 400 mg twice daily.   Vitamin D deficiency.  Patient is on vitamin D3 2000 units daily.  Vitamin B12 deficiency.  Patient is on vitamin B12 1000 mcg daily.  Intellectual disability.  Supportive therapy.   Prediabetes.  Hgb A1c 6.0% on 3/21/24.  Lifestyle modifications.   Left anterior neck abrasions.  Self inflicted.  Apply Bactroban three " times daily.   Left wrist and ankle pain.  Had x-rays 3/20/24.  Left wrist x-ray showing no fracture or malalignment but remains skeletally immature.  Left ankle x-ray with no acute abnormality but has chronic deformity of ankle and foot as described with arthritic changes at the tibiotalar joint and prominent swelling.  Tylenol as needed. Lidocaine patch daily.  Apply ice as needed. PT OT consulted.    Dry eyes.  Start artifical tear drops twice daily.  Allergic Rhinitis. Start Flonase daily.   Cutaneous candidiasis. Apply Nystatin powder to affected areas twice daily.  Yeast infection. Diflucan 150 mg x 1 dose 3/27.   UTI. Start Macrobid 100 mg twice daily x 5 days (thru 4/1).   Callus. Patient states she does see podiatry outpatient. They cut her nails. Encouraged to f/u with them.     The patient was discussed with Dr. Arguelles and he is in agreement with the above note.

## 2024-03-29 NOTE — PHYSICAL THERAPY NOTE
03/29/24 1227   PT Last Visit   PT Visit Date 03/29/24   Note Type   Note Type Cancelled Session   Cancel Reasons Other                                          Physical Therapy Cancellation Note       PT treatment attempted but as leaving older adult unit to enter younger adult unit, lunch trays were being transported into younger adult unit to be provided to patients.  Patient unavailable due to currently being provided with lunch.  Will continue to offer PT treatment as ordered and progress as able when patient is available to participate.         Julienne Bhatia, PTA

## 2024-03-29 NOTE — ESCALATED TEAM TX
Team Meeting Note    Patient name Yaritza Dunne  Location [facility] 256/[facility] 256-02 MRN [id number]  : 1980 Date 3/29/2024       Team Meeting  Meeting Type: Escalated Tx Team Meeting    Team Members Present  Team Members Present: Physician,   Physician Team Member: Yeison BURR  Social Work Team Member: Raymond LEON    Patient/Family Present  Patient Present: Yes  Patient's Family Present: Yes  Family Relationship: Parent  Parent: Kiley Dunne (Mother) [phone]    Team Meeting - Additional Comments: Guerline Friend <[email or web address]>     15 min dc planning meeting. GH, pt mother/legal guardian, pt agreeable to dc Monday 10-1030am. Dr Acuna OP psych to see pt in person . DC faxed to [phone]. NP provided update on pt meds, progress.     Kiley Dunne (Mother/Legal Guardian) declined right to appeal pt dc (IMM).

## 2024-03-29 NOTE — PLAN OF CARE
Problem: Ineffective Coping  Goal: Participates in unit activities  Description: Interventions:  - Provide therapeutic environment   - Provide required programming   - Redirect inappropriate behaviors   Outcome: Progressing     Problem: Risk for Self Injury/Neglect  Goal: Verbalize thoughts and feelings  Description: Interventions:  - Assess and re-assess patient's lethality and potential for self-injury  - Engage patient in 1:1 interactions, daily, for a minimum of 15 minutes  - Encourage patient to express feelings, fears, frustrations, hopes  - Establish rapport/trust with patient   Outcome: Progressing  Goal: Refrain from harming self  Description: Interventions:  - Monitor patient closely, per order  - Develop a trusting relationship  - Supervise medication ingestion, monitor effects and side effects   Outcome: Progressing

## 2024-03-29 NOTE — PROGRESS NOTES
03/29/24 1100   Team Meeting   Meeting Type Escalated Tx Team Meeting   Team Members Present   Team Members Present Physician;   Physician Team Member Yeison BURR   Social Work Team Member Raymond LEON   Patient/Family Present   Patient Present Yes   Patient's Family Present Yes   Family Relationship Parent   Parent Kiley Dunne (Mother) [phone]   OTHER   Team Meeting - Additional Comments Guerline Friend [email or web address]

## 2024-03-29 NOTE — PROGRESS NOTES
"Progress Note - Behavioral Health   Yaritza Dunne 44 y.o. female MRN: [id number]  Unit/Bed#: [facility], [place] 256-02 Encounter: [id number]    Assessment/Plan   Principal Problem:    Bipolar 1 disorder, mixed, severe (HCC)      Behavior over the last 24 hours:  improved  Sleep: normal  Appetite: normal  Medication side effects: No  ROS: no complaints and all other systems are negative    Yaritza continues to maintain mood control and safety on unit.  Participatory in milieu activities.  Has had no verbal or behavioral outbursts and requires no additional PRN medications; utilizes coping skills appropriately.  Behavior is childlike, cooperative, and redirectable.  Less intrusive.  Excited for discharge home and identifies adequate safety plan which includes talking to staff.  Describes mother as strong protective factor against suicide.  Compliant with medications and meals.    Mental Status Evaluation:  Appearance:  Tall, overweight, braided hair, wearing pajamas , marginal hygiene   Behavior:  Childlike, cooperative, redirectable   Speech:  normal pitch, normal volume, and mumbled at times   Mood:  \"Good\"   Affect:  mood-congruent and redirectable   Thought Process:  concrete and perserverative   Associations: concrete associations   Thought Content:  No overt delusions or paranoia verbalized   Perceptual Disturbances: Denies AVH, did not appear internally preoccupied   Risk Potential: Suicidal Ideations none  Homicidal Ideations none  Potential for Aggression No   Sensorium:  person, place, and time/date   Memory:  recent and remote memory grossly intact   Consciousness:  alert and awake    Attention: Decreased attention/concentration   Insight:  Impaired, history of ID   Judgment: Impaired, history of ID   Gait/Station: slow   Motor Activity: no abnormal movements     Progress Toward Goals: Continues to maintain mood control and safety with no behavioral outbursts.  Utilizes coping skills appropriately with no utilization of PRN " medications.  Depakote level due tomorrow, 3/30/2024 at 6 AM.  Patient appears to be approaching her psychiatric baseline and will be discharging back to group [place] Monday, 4/1/24, and follow-up with established outpatient psychiatrist.    Recommended Treatment: Continue with group therapy, milieu therapy and occupational therapy.      Risks, benefits and possible side effects of Medications:   Yaritza has limited understanding of risk versus benefits of medications, but agrees to take as prescribed.    Medications: all current active meds have been reviewed, continue current psychiatric medications, and current meds:   Current Facility-Administered Medications   Medication Dose Route Frequency    acetaminophen (TYLENOL) tablet 650 mg  650 mg Oral Q6H PRN    acetaminophen (TYLENOL) tablet 650 mg  650 mg Oral Q4H PRN    acetaminophen (TYLENOL) tablet 975 mg  975 mg Oral Q6H PRN    albuterol (PROVENTIL HFA,VENTOLIN HFA) inhaler 2 puff  2 puff Inhalation Q6H PRN    aluminum-magnesium hydroxide-simethicone (MAALOX) oral suspension 30 mL  30 mL Oral Q4H PRN    ARIPiprazole (ABILIFY) tablet 15 mg  15 mg Oral Daily    Artificial Tears ophthalmic solution 1 drop  1 drop Both Eyes Q6H PRN    benztropine (COGENTIN) tablet 1 mg  1 mg Oral Q6H PRN    calcium carbonate-vitamin D 500 mg-5 mcg tablet 1 tablet  1 tablet Oral BID With Meals    cholecalciferol (VITAMIN D3) tablet 2,000 Units  2,000 Units Oral Daily    clonazePAM (KlonoPIN) tablet 0.5 mg  0.5 mg Oral BID    cyanocobalamin (VITAMIN B-12) tablet 1,000 mcg  1,000 mcg Oral Daily    divalproex sodium (DEPAKOTE ER) 24 hr tablet 1,500 mg  1,500 mg Oral HS    docusate sodium (COLACE) capsule 100 mg  100 mg Oral Daily    fish oil capsule 1,000 mg  1,000 mg Oral TID    fluticasone (FLONASE) 50 mcg/act nasal spray 1 spray  1 spray Each Nare Daily    furosemide (LASIX) tablet 40 mg  40 mg Oral Daily    hydrocortisone (CORTEF) tablet 15 mg  15 mg Oral Daily    hydrOXYzine HCL  (ATARAX) tablet 25 mg  25 mg Oral Q6H PRN Max 4/day    hydrOXYzine HCL (ATARAX) tablet 50 mg  50 mg Oral Q4H PRN Max 4/day    Or    LORazepam (ATIVAN) injection 1 mg  1 mg Intramuscular Q4H PRN    levothyroxine tablet 200 mcg  200 mcg Oral Once per day on Monday Tuesday Wednesday Thursday Friday    levothyroxine tablet 336 mcg  336 mcg Oral Once per day on Sunday Saturday    lidocaine (LIDODERM) 5 % patch 1 patch  1 patch Topical Daily    loperamide (IMODIUM) capsule 2 mg  2 mg Oral 4x Daily PRN    LORazepam (ATIVAN) tablet 1 mg  1 mg Oral Q6H PRN    Or    LORazepam (ATIVAN) injection 2 mg  2 mg Intramuscular Q6H PRN Max 3/day    magnesium Oxide (MAG-OX) tablet 400 mg  400 mg Oral BID    meclizine (ANTIVERT) tablet 25 mg  25 mg Oral Q12H PRN    meloxicam (MOBIC) tablet 15 mg  15 mg Oral Daily PRN    multivitamin stress formula tablet 1 tablet  1 tablet Oral Daily    nitrofurantoin (MACROBID) extended-release capsule 100 mg  100 mg Oral BID With Meals    nystatin (MYCOSTATIN) powder   Topical BID    OLANZapine (ZyPREXA) tablet 10 mg  10 mg Oral Q3H PRN Max 3/day    Or    OLANZapine (ZyPREXA) IM injection 10 mg  10 mg Intramuscular Q3H PRN Max 3/day    OLANZapine (ZyPREXA) tablet 5 mg  5 mg Oral Q3H PRN Max 6/day    Or    OLANZapine (ZyPREXA) IM injection 5 mg  5 mg Intramuscular Q3H PRN Max 6/day    OLANZapine (ZyPREXA) tablet 2.5 mg  2.5 mg Oral Q3H PRN Max 8/day    ondansetron (ZOFRAN-ODT) dispersible tablet 4 mg  4 mg Oral Q6H PRN    polyethylene glycol (MIRALAX) packet 17 g  17 g Oral Daily PRN    sertraline (ZOLOFT) tablet 100 mg  100 mg Oral Daily    traZODone (DESYREL) tablet 100 mg  100 mg Oral HS PRN    triamcinolone (KENALOG) 0.1 % cream   Topical BID PRN   .    Labs: I have personally reviewed all pertinent laboratory/tests results. CBC:   Lab Results   Component Value Date    WBC 6.01 03/21/2024    RBC 4.19 03/21/2024    HGB 12.2 03/21/2024    HCT 37.4 03/21/2024    MCV 89 03/21/2024     03/21/2024     MCH 29.1 03/21/2024    MCHC 32.6 03/21/2024    RDW 12.4 03/21/2024    MPV 11.5 03/21/2024    NEUTROABS 2.45 03/21/2024     CMP:   Lab Results   Component Value Date    SODIUM 140 03/21/2024    K 4.1 03/21/2024     03/21/2024    CO2 29 03/21/2024    AGAP 9 03/21/2024    BUN 13 03/21/2024    CREATININE 0.52 (L) 03/21/2024    GLUC 89 03/21/2024    GLUF 89 03/21/2024    CALCIUM 8.9 03/21/2024    AST 13 03/21/2024    ALT 14 03/21/2024    ALKPHOS 58 03/21/2024    TP 5.3 (L) 03/21/2024    ALB 3.4 (L) 03/21/2024    TBILI 0.22 03/21/2024    EGFR 116 03/21/2024     Depakote:   Lab Results   Component Value Date    VALPROICTOT 66 03/24/2024     EKG   Lab Results   Component Value Date    VENTRATE 70 03/21/2024    ATRIALRATE 70 03/21/2024    PRINT 164 03/21/2024    QRSDINT 112 03/21/2024    QTINT 416 03/21/2024    QTCINT 449 03/21/2024    PAXIS 10 03/21/2024    QRSAXIS 24 03/21/2024    TWAVEAXIS 60 03/21/2024       Counseling / Coordination of Care  Total floor / unit time spent today 30 minutes. Greater than 50% of total time was spent with the patient and / or family counseling and / or coordination of care. A description of the counseling / coordination of care: Medication education, safety/discharge planning, meeting with [facility] group home/mother regarding discharge planning

## 2024-03-30 LAB — VALPROATE SERPL-MCNC: 85 UG/ML (ref 50–100)

## 2024-03-30 PROCEDURE — 99232 SBSQ HOSP IP/OBS MODERATE 35: CPT | Performed by: HOSPITALIST

## 2024-03-30 PROCEDURE — 80164 ASSAY DIPROPYLACETIC ACD TOT: CPT | Performed by: NURSE PRACTITIONER

## 2024-03-30 RX ADMIN — DIVALPROEX SODIUM 1500 MG: 500 TABLET, FILM COATED, EXTENDED RELEASE ORAL at 20:38

## 2024-03-30 RX ADMIN — NITROFURANTOIN (MONOHYDRATE/MACROCRYSTALS) 100 MG: 75; 25 CAPSULE ORAL at 15:42

## 2024-03-30 RX ADMIN — OMEGA-3 FATTY ACIDS CAP 1000 MG 1000 MG: 1000 CAP at 15:41

## 2024-03-30 RX ADMIN — CYANOCOBALAMIN TAB 500 MCG 1000 MCG: 500 TAB at 08:20

## 2024-03-30 RX ADMIN — TRIAMCINOLONE ACETONIDE: 1 CREAM TOPICAL at 21:18

## 2024-03-30 RX ADMIN — FUROSEMIDE 40 MG: 40 TABLET ORAL at 08:20

## 2024-03-30 RX ADMIN — Medication 1 TABLET: at 08:20

## 2024-03-30 RX ADMIN — B-COMPLEX W/ C & FOLIC ACID TAB 1 TABLET: TAB at 08:20

## 2024-03-30 RX ADMIN — DOCUSATE SODIUM 100 MG: 100 CAPSULE, LIQUID FILLED ORAL at 08:21

## 2024-03-30 RX ADMIN — OMEGA-3 FATTY ACIDS CAP 1000 MG 1000 MG: 1000 CAP at 20:38

## 2024-03-30 RX ADMIN — Medication 400 MG: at 08:21

## 2024-03-30 RX ADMIN — NITROFURANTOIN (MONOHYDRATE/MACROCRYSTALS) 100 MG: 75; 25 CAPSULE ORAL at 08:20

## 2024-03-30 RX ADMIN — ACETAMINOPHEN 975 MG: 325 TABLET ORAL at 08:18

## 2024-03-30 RX ADMIN — CLONAZEPAM 0.5 MG: 0.5 TABLET ORAL at 18:33

## 2024-03-30 RX ADMIN — MELOXICAM 15 MG: 7.5 TABLET ORAL at 22:36

## 2024-03-30 RX ADMIN — LEVOTHYROXINE SODIUM 336 MCG: 112 TABLET ORAL at 05:51

## 2024-03-30 RX ADMIN — SERTRALINE 100 MG: 100 TABLET, FILM COATED ORAL at 08:20

## 2024-03-30 RX ADMIN — ARIPIPRAZOLE 15 MG: 15 TABLET ORAL at 08:20

## 2024-03-30 RX ADMIN — Medication 1 TABLET: at 15:42

## 2024-03-30 RX ADMIN — Medication 2000 UNITS: at 08:21

## 2024-03-30 RX ADMIN — LIDOCAINE 5% 1 PATCH: 700 PATCH TOPICAL at 08:25

## 2024-03-30 RX ADMIN — FLUTICASONE PROPIONATE 1 SPRAY: 50 SPRAY, METERED NASAL at 08:26

## 2024-03-30 RX ADMIN — NYSTATIN: 100000 POWDER TOPICAL at 08:25

## 2024-03-30 RX ADMIN — OMEGA-3 FATTY ACIDS CAP 1000 MG 1000 MG: 1000 CAP at 08:20

## 2024-03-30 RX ADMIN — TRAZODONE HYDROCHLORIDE 100 MG: 100 TABLET ORAL at 20:38

## 2024-03-30 RX ADMIN — CLONAZEPAM 0.5 MG: 0.5 TABLET ORAL at 08:20

## 2024-03-30 RX ADMIN — Medication 400 MG: at 18:33

## 2024-03-30 RX ADMIN — HYDROCORTISONE 15 MG: 10 TABLET ORAL at 08:20

## 2024-03-30 NOTE — PLAN OF CARE
Problem: Alteration in Thoughts and Perception  Goal: Treatment Goal: Gain control of psychotic behaviors/thinking, reduce/eliminate presenting symptoms and demonstrate improved reality functioning upon discharge  Outcome: Progressing  Goal: Refrain from acting on delusional thinking/internal stimuli  Description: Interventions:  - Monitor patient closely, per order   - Utilize least restrictive measures   - Set reasonable limits, give positive feedback for acceptable   - Administer medications as ordered and monitor of potential side effects  Outcome: Progressing  Goal: Agree to be compliant with medication regime, as prescribed and report medication side effects  Description: Interventions:  - Offer appropriate PRN medication and supervise ingestion; conduct AIMS, as needed   Outcome: Progressing     Problem: Risk for Self Injury/Neglect  Goal: Treatment Goal: Remain safe during length of stay, learn and adopt new coping skills, and be free of self-injurious ideation, impulses and acts at the time of discharge  Outcome: Progressing  Goal: Refrain from harming self  Description: Interventions:  - Monitor patient closely, per order  - Develop a trusting relationship  - Supervise medication ingestion, monitor effects and side effects   Outcome: Progressing     Problem: Risk for Violence/Aggression Toward Others  Goal: Treatment Goal: Refrain from acts of violence/aggression during length of stay, and demonstrate improved impulse control at the time of discharge  Outcome: Progressing  Goal: Refrain from harming others  Outcome: Progressing  Goal: Refrain from destructive acts on the environment or property  Outcome: Progressing  Goal: Control angry outbursts  Description: Interventions:  - Monitor patient closely, per order  - Ensure early verbal de-escalation  - Monitor prn medication needs  - Set reasonable/therapeutic limits, outline behavioral expectations, and consequences   - Provide a non-threatening milieu,  utilizing the least restrictive interventions   Outcome: Progressing   See chart note

## 2024-03-30 NOTE — PROGRESS NOTES
"Progress Note - Yaritza Dunne 44 y.o. female MRN: [id number]    Unit/Bed#: [facility] 256-02 Encounter: [id number]        Subjective:   Patient seen and examined at bedside after reviewing the chart and discussing the case with the caring staff.      Patient examined at bedside.  Patient has no acute symptoms.     Patient is being discharged Monday, 4/1/24.     Physical Exam   Vitals: Blood pressure 121/84, pulse 83, temperature 97.6 °F (36.4 °C), temperature source Temporal, resp. rate 16, height 5' 8\" (1.727 m), weight 129 kg (284 lb 12.8 oz), SpO2 98%.,Body mass index is 43.3 kg/m².  Constitutional: Patient in no acute distress.  HEENT: PERR, EOMI, MMM.  Cardiovascular: Normal rate and regular rhythm.    Pulmonary/Chest: Effort normal and breath sounds normal.   Abdomen: Soft, + BS, NT.  Skin: BLE edema.     Assessment/Plan:  Yaritza Dunne is a(n) 43 y.o. female with bipolar disorder.     Medical Clearance: Patient is medically cleared for discharge. All prescriptions have been sent to pharmacy.      Hypothyroidism.  Home:  levothyroxine 224 mcg M-F and 336 mcg on S/Sn.  TSH level <0.010, FT4 0.95 on 3/21/24.  Decrease levothyroxine 224 to 200 mcg daily. Repeat TSH <0.010, FT4 1.10 on 3/28/24.  F/u with PCP/endocrinology outpt.  Adrenocortical insufficiency.  Patient is on hydrocortisone 15 mg daily.  Epilepsy.  Continue Depakote nightly.  Dyslipidemia.  Patient is on fish oil 1 mg three times daily.  Lower extremity edema.  Lasix 20 mg daily. Increase lasix to 40 mg daily 3/24. Continue to monitor.   Hypomagnesemia.  Continue mag oxide 400 mg twice daily.   Vitamin D deficiency.  Patient is on vitamin D3 2000 units daily.  Vitamin B12 deficiency.  Patient is on vitamin B12 1000 mcg daily.  Intellectual disability.  Supportive therapy.   Prediabetes.  Hgb A1c 6.0% on 3/21/24.  Lifestyle modifications.   Left anterior neck abrasions.  Self inflicted.  Apply Bactroban three times daily.   Left wrist and ankle pain.  Had " x-rays 3/20/24.  Left wrist x-ray showing no fracture or malalignment but remains skeletally immature.  Left ankle x-ray with no acute abnormality but has chronic deformity of ankle and foot as described with arthritic changes at the tibiotalar joint and prominent swelling.  Tylenol as needed. Lidocaine patch daily.  Apply ice as needed. PT OT consulted.    Dry eyes.  Start artifical tear drops twice daily.  Allergic Rhinitis. Start Flonase daily.   Cutaneous candidiasis.  Apply Nystatin powder to affected areas twice daily.  Yeast infection.  Diflucan 150 mg x 1 dose 3/27.   UTI.  Start Macrobid 100 mg twice daily x 5 days (thru 4/1).   Callus.  Patient states she does see podiatry outpatient. They cut her nails. Encouraged to f/u with them.     The patient was discussed with Dr. Arguelles and he is in agreement with the above note.

## 2024-03-30 NOTE — PROGRESS NOTES
Progress Note - Behavioral Health     Yaritza Dunne 44 y.o. female MRN: [id number]   Unit/Bed#: [facility] 256-02 Encounter: [id number]    Behavior over the last 24 hours: improving.     Yaritza seen today, per staff report has been visible in the milieu and more appropriate on the unit.  Patient seen today reports feeling much less anxious.  Has not had remission of her emotional outbursts.  Mood more stable.  Denies overt depression.  Denies any thoughts of self-harm or others.  States she looks forward to returning to her group home after the weekend.        Mental Status Evaluation:    Appearance:  casually dressed, adequate grooming, looks stated age   Behavior:  calm   Speech:  normal rate, normal volume, impoverished   Mood:  mildly anxious   Affect:  blunted   Thought Process:  linear   Associations: circumstantial associations   Thought Content:  no overt delusions   Perceptual Disturbances: no auditory hallucinations, no visual hallucinations   Risk Potential: Suicidal ideation - None  Homicidal ideation - None   Sensorium:  oriented to person, place, and time/date   Memory:  recent and remote memory grossly intact   Consciousness:  alert and awake   Attention: decreased concentration and decreased attention span   Insight:  limited   Judgment: limited   Gait/Station: normal gait/station   Motor Activity: no abnormal movements     Vital signs in last 24 hours:    Temp:  [97.6 °F (36.4 °C)] 97.6 °F (36.4 °C)  HR:  [68-83] 83  Resp:  [16] 16  BP: ()/(50-84) 121/84    Laboratory results: I have personally reviewed all pertinent laboratory/tests results.        Assessment/Plan   Principal Problem:    Bipolar 1 disorder, mixed, severe (HCC)    Recommended Treatment:     Planned medication and treatment changes:  Continue current medication, discharge planning for Monday    All current active medications have been reviewed  Encourage group therapy, milieu therapy and occupational therapy  Behavioral Health checks  every 7 minutes  Current Facility-Administered Medications   Medication Dose Route Frequency Provider Last Rate    acetaminophen  650 mg Oral Q6H PRN Leelee M Medei, CRNP      acetaminophen  650 mg Oral Q4H PRN Leelee M Medei, CRNP      acetaminophen  975 mg Oral Q6H PRN Leelee M Medei, CRNP      albuterol  2 puff Inhalation Q6H PRN Rosa Isela Irizarry PA-C      aluminum-magnesium hydroxide-simethicone  30 mL Oral Q4H PRN Leelee M Medei, CRNP      ARIPiprazole  15 mg Oral Daily Steven Dunlap MD      Artificial Tears  1 drop Both Eyes Q6H PRN Rosa Isela Irizarry PA-C      benztropine  1 mg Oral Q6H PRN Leelee Stephenei, CRNP      calcium carbonate-vitamin D  1 tablet Oral BID With Meals Rosa Isela Irizarry PA-C      cholecalciferol  2,000 Units Oral Daily Rosa Isela Irizarry PA-C      clonazePAM  0.5 mg Oral BID Hallie Zhou MD      cyanocobalamin  1,000 mcg Oral Daily Rosa Isela Irizarry PA-C      divalproex sodium  1,500 mg Oral HS Leelee Latham, LENO      docusate sodium  100 mg Oral Daily Rosa Isela Irizarry PA-C      fish oil  1,000 mg Oral TID Rosa Isela Iriazrry PA-C      fluticasone  1 spray Each Nare Daily Rosa Isela Irizarry PA-C      furosemide  40 mg Oral Daily Rosa Isela Irizarry PA-C      hydrocortisone  15 mg Oral Daily Rosa Isela Irizarry PA-C      hydrOXYzine HCL  25 mg Oral Q6H PRN Max 4/day Leelee M Medei, CRNP      hydrOXYzine HCL  50 mg Oral Q4H PRN Max 4/day Leelee M Medei, CRTIFFANY      Or    LORazepam  1 mg Intramuscular Q4H PRN Leelee LUNA Medei, CRNP      levothyroxine  200 mcg Oral Once per day on Monday Tuesday Wednesday Thursday Friday Keshia Funes PA-C      levothyroxine  336 mcg Oral Once per day on Sunday Saturday Rosa Isela Irizarry PA-C      lidocaine  1 patch Topical Daily Rosa Isela Irizarry PA-C      loperamide  2 mg Oral 4x Daily PRN Chinese Blane, MD      LORazepam  1 mg Oral Q6H PRN LENO Saunders      Or     LORazepam  2 mg Intramuscular Q6H PRN Max 3/day Leelee M Medei, CRNP      magnesium Oxide  400 mg Oral BID Rosa Isela Irizarry PA-C      meclizine  25 mg Oral Q12H PRN Rosa Isela Irizarry PA-C      meloxicam  15 mg Oral Daily PRN Rosa Isela Irizarry PA-C      multivitamin stress formula  1 tablet Oral Daily Steven Dunlap MD      nitrofurantoin  100 mg Oral BID With Meals Rosa Isela Irizarry PA-C      nystatin   Topical BID Rosa Isela Irizarry PA-C      OLANZapine  10 mg Oral Q3H PRN Max 3/day Leelee M Medei, CRNP      Or    OLANZapine  10 mg Intramuscular Q3H PRN Max 3/day Leelee M Medei, CRNP      OLANZapine  5 mg Oral Q3H PRN Max 6/day Leelee M Medei, CRNP      Or    OLANZapine  5 mg Intramuscular Q3H PRN Max 6/day Leelee M Medei, CRNP      OLANZapine  2.5 mg Oral Q3H PRN Max 8/day Leelee M Medei, CRNP      ondansetron  4 mg Oral Q6H PRN Tez Arguelles MD      polyethylene glycol  17 g Oral Daily PRN Leelee M Medei, CRNP      sertraline  100 mg Oral Daily Leelee M Medei, CRNP      traZODone  100 mg Oral HS PRN Leelee M Medei, CRNP      triamcinolone   Topical BID PRN Rosa Isela Irizarry PA-C         Risks / Benefits of Treatment:    Risks, benefits, and possible side effects of medications explained to patient and patient verbalizes understanding and agreement for treatment.    Counseling / Coordination of Care:    Total floor / unit time spent today 25 minutes. Greater than 50% of total time was spent with the patient and / or family counseling and / or coordination of care. A description of counseling / coordination of care:  Patient's progress discussed with staff in treatment team meeting.  Medications, treatment progress and treatment plan reviewed with patient.    Corazon De Leon MD 03/30/24

## 2024-03-31 PROCEDURE — 99232 SBSQ HOSP IP/OBS MODERATE 35: CPT | Performed by: HOSPITALIST

## 2024-03-31 RX ADMIN — ARIPIPRAZOLE 15 MG: 15 TABLET ORAL at 09:12

## 2024-03-31 RX ADMIN — OMEGA-3 FATTY ACIDS CAP 1000 MG 1000 MG: 1000 CAP at 21:23

## 2024-03-31 RX ADMIN — OMEGA-3 FATTY ACIDS CAP 1000 MG 1000 MG: 1000 CAP at 09:07

## 2024-03-31 RX ADMIN — B-COMPLEX W/ C & FOLIC ACID TAB 1 TABLET: TAB at 09:07

## 2024-03-31 RX ADMIN — DOCUSATE SODIUM 100 MG: 100 CAPSULE, LIQUID FILLED ORAL at 09:07

## 2024-03-31 RX ADMIN — NITROFURANTOIN (MONOHYDRATE/MACROCRYSTALS) 100 MG: 75; 25 CAPSULE ORAL at 15:42

## 2024-03-31 RX ADMIN — LIDOCAINE 5% 1 PATCH: 700 PATCH TOPICAL at 09:07

## 2024-03-31 RX ADMIN — NYSTATIN: 100000 POWDER TOPICAL at 17:27

## 2024-03-31 RX ADMIN — Medication 400 MG: at 09:07

## 2024-03-31 RX ADMIN — OMEGA-3 FATTY ACIDS CAP 1000 MG 1000 MG: 1000 CAP at 15:42

## 2024-03-31 RX ADMIN — ACETAMINOPHEN 975 MG: 325 TABLET ORAL at 15:14

## 2024-03-31 RX ADMIN — Medication 2000 UNITS: at 09:12

## 2024-03-31 RX ADMIN — Medication 1 TABLET: at 15:42

## 2024-03-31 RX ADMIN — LORAZEPAM 1 MG: 1 TABLET ORAL at 21:23

## 2024-03-31 RX ADMIN — HYDROCORTISONE 15 MG: 10 TABLET ORAL at 09:07

## 2024-03-31 RX ADMIN — Medication 1 TABLET: at 09:12

## 2024-03-31 RX ADMIN — CLONAZEPAM 0.5 MG: 0.5 TABLET ORAL at 09:07

## 2024-03-31 RX ADMIN — SERTRALINE 100 MG: 100 TABLET, FILM COATED ORAL at 09:07

## 2024-03-31 RX ADMIN — DIVALPROEX SODIUM 1500 MG: 500 TABLET, FILM COATED, EXTENDED RELEASE ORAL at 21:23

## 2024-03-31 RX ADMIN — FLUTICASONE PROPIONATE 1 SPRAY: 50 SPRAY, METERED NASAL at 09:07

## 2024-03-31 RX ADMIN — NITROFURANTOIN (MONOHYDRATE/MACROCRYSTALS) 100 MG: 75; 25 CAPSULE ORAL at 09:12

## 2024-03-31 RX ADMIN — Medication 400 MG: at 17:26

## 2024-03-31 RX ADMIN — CLONAZEPAM 0.5 MG: 0.5 TABLET ORAL at 17:26

## 2024-03-31 RX ADMIN — LEVOTHYROXINE SODIUM 336 MCG: 112 TABLET ORAL at 04:35

## 2024-03-31 RX ADMIN — CYANOCOBALAMIN TAB 500 MCG 1000 MCG: 500 TAB at 09:07

## 2024-03-31 RX ADMIN — NYSTATIN: 100000 POWDER TOPICAL at 09:07

## 2024-03-31 RX ADMIN — FUROSEMIDE 40 MG: 40 TABLET ORAL at 09:07

## 2024-03-31 NOTE — PROGRESS NOTES
Progress Note - Behavioral Health     Yaritza Dunne 44 y.o. female MRN: [id number]   Unit/Bed#: [facility] 256-02 Encounter: [id number]    Behavior over the last 24 hours: improving.     Yaritza seen today, per staff report has been visible, less anxious on the unit.  Patient seen today, continues to have some preoccupation with somatic complaints.  Is however significantly less anxious, no longer having any emotional outbursts on the unit.  Denies overt depression.  Denies any thoughts of self-harm or others.  Voices inability to cope with stressors and being less anxious.  Insight and judgment improving.         Mental Status Evaluation:    Appearance:  casually dressed, marginal hygiene, looks stated age   Behavior:  cooperative, calm   Speech:  increased rate   Mood:  minimally anxious   Affect:  normal range and intensity   Thought Process:  linear, perseverative   Associations: intact associations   Thought Content:  no overt delusions   Perceptual Disturbances: no auditory hallucinations, no visual hallucinations   Risk Potential: Suicidal ideation - None at present  Homicidal ideation - None at present   Sensorium:  oriented to person, place, and time/date   Memory:  recent and remote memory grossly intact   Consciousness:  alert and awake   Attention: attention span and concentration are age appropriate   Insight:  limited   Judgment: limited   Gait/Station: ataxic, slow gait   Motor Activity: no abnormal movements     Vital signs in last 24 hours:    Temp:  [98.3 °F (36.8 °C)-98.4 °F (36.9 °C)] 98.4 °F (36.9 °C)  HR:  [72-75] 75  Resp:  [16] 16  BP: (90-91)/(54-67) 90/67    Laboratory results: I have personally reviewed all pertinent laboratory/tests results.        Assessment/Plan   Principal Problem:    Bipolar 1 disorder, mixed, severe (HCC)    Recommended Treatment:     Planned medication and treatment changes:  Continue current medication.  Patient appears stable for plan to discharge in the a.m.    All current  active medications have been reviewed  Encourage group therapy, milieu therapy and occupational therapy  Behavioral Health checks every 7 minutes  Current Facility-Administered Medications   Medication Dose Route Frequency Provider Last Rate    acetaminophen  650 mg Oral Q6H PRN Leelee M Medei, CRNP      acetaminophen  650 mg Oral Q4H PRN Leelee M Medei, CRNP      acetaminophen  975 mg Oral Q6H PRN Leelee M Medei, CRNP      albuterol  2 puff Inhalation Q6H PRN Rosa Isela Irizarry PA-C      aluminum-magnesium hydroxide-simethicone  30 mL Oral Q4H PRN Leelee M Medei, CRNP      ARIPiprazole  15 mg Oral Daily Steven Dunlap MD      Artificial Tears  1 drop Both Eyes Q6H PRN Rosa Isela Irizarry PA-C      benztropine  1 mg Oral Q6H PRN Leelee M Medei, CRNP      calcium carbonate-vitamin D  1 tablet Oral BID With Meals Rosa Isela Irizarry PA-C      cholecalciferol  2,000 Units Oral Daily Rosa Isela Irizarry PA-C      clonazePAM  0.5 mg Oral BID Hallie Zhou MD      cyanocobalamin  1,000 mcg Oral Daily Rosa Isela Irizarry PA-C      divalproex sodium  1,500 mg Oral HS Leelee LUNA Medei, LENO      docusate sodium  100 mg Oral Daily Rosa Isela Irizarry PA-C      fish oil  1,000 mg Oral TID Rosa Isela Irizarry PA-C      fluticasone  1 spray Each Nare Daily Rosa Isela Irizarry PA-C      furosemide  40 mg Oral Daily Rosa Isela Irizarry PA-C      hydrocortisone  15 mg Oral Daily Rosa Isela Irizarry PA-C      hydrOXYzine HCL  25 mg Oral Q6H PRN Max 4/day Leelee M Medei, CRTIFFANY      hydrOXYzine HCL  50 mg Oral Q4H PRN Max 4/day Leelee M Medei, CRTIFFANY      Or    LORazepam  1 mg Intramuscular Q4H PRN Leelee M Medei, CRNP      levothyroxine  200 mcg Oral Once per day on Monday Tuesday Wednesday Thursday Friday Keshia Funes PA-C      levothyroxine  336 mcg Oral Once per day on Sunday Saturday Rosa Isela Irizarry PA-C      lidocaine  1 patch Topical Daily Rosa Isela Rogers  CEE Irizarry      loperamide  2 mg Oral 4x Daily PRN Tez Arguelles MD      LORazepam  1 mg Oral Q6H PRN Leelee M Medei, CRTIFFANY      Or    LORazepam  2 mg Intramuscular Q6H PRN Max 3/day Leelee M Medei, CRNP      magnesium Oxide  400 mg Oral BID Rosa Isela Irizarry PA-C      meclizine  25 mg Oral Q12H PRN Rosa Isela Irizarry PA-C      meloxicam  15 mg Oral Daily PRN Rosa Isela Irizarry PA-C      multivitamin stress formula  1 tablet Oral Daily Steven Dunlap MD      nitrofurantoin  100 mg Oral BID With Meals Rosa Isela Irizarry PA-C      nystatin   Topical BID Rosa Isela Irizarry PA-C      OLANZapine  10 mg Oral Q3H PRN Max 3/day Leelee M Medei, CRNP      Or    OLANZapine  10 mg Intramuscular Q3H PRN Max 3/day Leelee M Medei, CRNP      OLANZapine  5 mg Oral Q3H PRN Max 6/day Leelee M Medei, CRNP      Or    OLANZapine  5 mg Intramuscular Q3H PRN Max 6/day Leelee M Medei, CRNP      OLANZapine  2.5 mg Oral Q3H PRN Max 8/day Leelee M Medei, CRNP      ondansetron  4 mg Oral Q6H PRN Tez Arguelles MD      polyethylene glycol  17 g Oral Daily PRN Leelee M Medei, CRNP      sertraline  100 mg Oral Daily Leelee M Medei, CRNP      traZODone  100 mg Oral HS PRN Leelee M Medei, CRNP      triamcinolone   Topical BID PRN Rosa Isela Irizarry PA-C         Risks / Benefits of Treatment:    Risks, benefits, and possible side effects of medications explained to patient and patient verbalizes understanding and agreement for treatment.    Counseling / Coordination of Care:    Total floor / unit time spent today 25 minutes. Greater than 50% of total time was spent with the patient and / or family counseling and / or coordination of care. A description of counseling / coordination of care:  Patient's progress discussed with staff in treatment team meeting.  Medications, treatment progress and treatment plan reviewed with patient.    Corazon De Leon MD 03/31/24

## 2024-03-31 NOTE — PROGRESS NOTES
"Progress Note - Yaritza Dunne 44 y.o. female MRN: [id number]    Unit/Bed#: [facility] 256-02 Encounter: [id number]        Subjective:   Patient seen and examined at bedside after reviewing the chart and discussing the case with the caring staff.      Patient examined at bedside.  Patient has no acute symptoms.     Patient is being discharged Monday, 4/1/24.     Physical Exam   Vitals: Blood pressure 90/67, pulse 75, temperature 98.4 °F (36.9 °C), temperature source Temporal, resp. rate 16, height 5' 8\" (1.727 m), weight 130 kg (287 lb), SpO2 96%.,Body mass index is 43.64 kg/m².  Constitutional: Patient in no acute distress.  HEENT: PERR, EOMI, MMM.  Cardiovascular: Normal rate and regular rhythm.    Pulmonary/Chest: Effort normal and breath sounds normal.   Abdomen: Soft, + BS, NT.  Skin: BLE edema.     Assessment/Plan:  Yaritza Dunne is a(n) 43 y.o. female with bipolar disorder.     Medical Clearance: Patient is medically cleared for discharge. All prescriptions have been sent to pharmacy.      Hypothyroidism.  Home:  levothyroxine 224 mcg M-F and 336 mcg on S/Sn.  TSH level <0.010, FT4 0.95 on 3/21/24.  Decrease levothyroxine 224 to 200 mcg daily. Repeat TSH <0.010, FT4 1.10 on 3/28/24.  F/u with PCP/endocrinology outpt.  Adrenocortical insufficiency.  Patient is on hydrocortisone 15 mg daily.  Epilepsy.  Continue Depakote nightly.  Dyslipidemia.  Patient is on fish oil 1 mg three times daily.  Lower extremity edema.  Lasix 20 mg daily.  Increase lasix to 40 mg daily 3/24.  Change diet to low sodium.  Continue to monitor.   Hypomagnesemia.  Continue mag oxide 400 mg twice daily.   Vitamin D deficiency.  Patient is on vitamin D3 2000 units daily.  Vitamin B12 deficiency.  Patient is on vitamin B12 1000 mcg daily.  Intellectual disability.  Supportive therapy.   Prediabetes.  Hgb A1c 6.0% on 3/21/24.  Lifestyle modifications.   Left anterior neck abrasions.  Self inflicted.  Apply Bactroban three times daily.   Left wrist and " ankle pain.  Had x-rays 3/20/24.  Left wrist x-ray showing no fracture or malalignment but remains skeletally immature.  Left ankle x-ray with no acute abnormality but has chronic deformity of ankle and foot as described with arthritic changes at the tibiotalar joint and prominent swelling.  Tylenol as needed.  Lidocaine patch daily.  Apply ice as needed.  PT OT consulted.    Dry eyes.  Start artifical tear drops twice daily.  Allergic rhinitis. Start Flonase daily.   Cutaneous candidiasis.  Apply Nystatin powder to affected areas twice daily.  Yeast infection.  Diflucan 150 mg x 1 dose 3/27.   UTI.  Start Macrobid 100 mg twice daily x 5 days (thru 4/1).   Callus.  Patient states she does see podiatry outpatient. They cut her nails. Encouraged to f/u with them.   Sore throat.  Add chloraseptic spray as needed 3/31.  Can request lozenges as needed.  Encouraged adequate hydration.     The patient was discussed with Dr. Arguelles and he is in agreement with the above note.

## 2024-03-31 NOTE — PLAN OF CARE
Problem: Alteration in Thoughts and Perception  Goal: Treatment Goal: Gain control of psychotic behaviors/thinking, reduce/eliminate presenting symptoms and demonstrate improved reality functioning upon discharge  Outcome: Progressing  Goal: Verbalize thoughts and feelings  Description: Interventions:  - Promote a nonjudgmental and trusting relationship with the patient through active listening and therapeutic communication  - Assess patient's level of functioning, behavior and potential for risk  - Engage patient in 1 on 1 interactions  - Encourage patient to express fears, feelings, frustrations, and discuss symptoms    - [place] patient to reality, help patient recognize reality-based thinking   - Administer medications as ordered and assess for potential side effects  - Provide the patient education related to the signs and symptoms of the illness and desired effects of prescribed medications  Outcome: Progressing  Goal: Refrain from acting on delusional thinking/internal stimuli  Description: Interventions:  - Monitor patient closely, per order   - Utilize least restrictive measures   - Set reasonable limits, give positive feedback for acceptable   - Administer medications as ordered and monitor of potential side effects  Outcome: Progressing  Goal: Agree to be compliant with medication regime, as prescribed and report medication side effects  Description: Interventions:  - Offer appropriate PRN medication and supervise ingestion; conduct AIMS, as needed   Outcome: Progressing     Problem: Ineffective Coping  Goal: Participates in unit activities  Description: Interventions:  - Provide therapeutic environment   - Provide required programming   - Redirect inappropriate behaviors   Outcome: Progressing     Problem: Risk for Self Injury/Neglect  Goal: Treatment Goal: Remain safe during length of stay, learn and adopt new coping skills, and be free of self-injurious ideation, impulses and acts at the time of  discharge  Outcome: Progressing  Goal: Refrain from harming self  Description: Interventions:  - Monitor patient closely, per order  - Develop a trusting relationship  - Supervise medication ingestion, monitor effects and side effects   Outcome: Progressing     Problem: Risk for Violence/Aggression Toward Others  Goal: Treatment Goal: Refrain from acts of violence/aggression during length of stay, and demonstrate improved impulse control at the time of discharge  Outcome: Progressing  Goal: Refrain from harming others  Outcome: Progressing  Goal: Refrain from destructive acts on the environment or property  Outcome: Progressing  Goal: Control angry outbursts  Description: Interventions:  - Monitor patient closely, per order  - Ensure early verbal de-escalation  - Monitor prn medication needs  - Set reasonable/therapeutic limits, outline behavioral expectations, and consequences   - Provide a non-threatening milieu, utilizing the least restrictive interventions   Outcome: Progressing   See chart note

## 2024-04-01 VITALS
RESPIRATION RATE: 17 BRPM | BODY MASS INDEX: 43.5 KG/M2 | SYSTOLIC BLOOD PRESSURE: 114 MMHG | HEART RATE: 85 BPM | OXYGEN SATURATION: 98 % | WEIGHT: 287 LBS | DIASTOLIC BLOOD PRESSURE: 78 MMHG | HEIGHT: 68 IN | TEMPERATURE: 97.9 F

## 2024-04-01 PROBLEM — F31.63 BIPOLAR 1 DISORDER, MIXED, SEVERE (HCC): Status: RESOLVED | Noted: 2024-03-21 | Resolved: 2024-04-01

## 2024-04-01 PROCEDURE — 99238 HOSP IP/OBS DSCHRG MGMT 30/<: CPT | Performed by: NURSE PRACTITIONER

## 2024-04-01 RX ADMIN — FUROSEMIDE 40 MG: 40 TABLET ORAL at 08:44

## 2024-04-01 RX ADMIN — LIDOCAINE 5% 1 PATCH: 700 PATCH TOPICAL at 08:44

## 2024-04-01 RX ADMIN — ARIPIPRAZOLE 15 MG: 15 TABLET ORAL at 08:44

## 2024-04-01 RX ADMIN — Medication 2000 UNITS: at 08:44

## 2024-04-01 RX ADMIN — SERTRALINE 100 MG: 100 TABLET, FILM COATED ORAL at 08:44

## 2024-04-01 RX ADMIN — FLUTICASONE PROPIONATE 1 SPRAY: 50 SPRAY, METERED NASAL at 08:45

## 2024-04-01 RX ADMIN — HYDROCORTISONE 15 MG: 10 TABLET ORAL at 08:44

## 2024-04-01 RX ADMIN — LEVOTHYROXINE SODIUM 200 MCG: 0.1 TABLET ORAL at 08:44

## 2024-04-01 RX ADMIN — CYANOCOBALAMIN TAB 500 MCG 1000 MCG: 500 TAB at 08:44

## 2024-04-01 RX ADMIN — Medication 400 MG: at 08:44

## 2024-04-01 RX ADMIN — Medication 1 TABLET: at 08:44

## 2024-04-01 RX ADMIN — NITROFURANTOIN (MONOHYDRATE/MACROCRYSTALS) 100 MG: 75; 25 CAPSULE ORAL at 08:44

## 2024-04-01 RX ADMIN — CLONAZEPAM 0.5 MG: 0.5 TABLET ORAL at 08:44

## 2024-04-01 RX ADMIN — OMEGA-3 FATTY ACIDS CAP 1000 MG 1000 MG: 1000 CAP at 08:44

## 2024-04-01 RX ADMIN — B-COMPLEX W/ C & FOLIC ACID TAB 1 TABLET: TAB at 08:44

## 2024-04-01 RX ADMIN — DOCUSATE SODIUM 100 MG: 100 CAPSULE, LIQUID FILLED ORAL at 08:44

## 2024-04-01 NOTE — PLAN OF CARE
Problem: Alteration in Thoughts and Perception  Goal: Treatment Goal: Gain control of psychotic behaviors/thinking, reduce/eliminate presenting symptoms and demonstrate improved reality functioning upon discharge  Outcome: Progressing  Goal: Verbalize thoughts and feelings  Description: Interventions:  - Promote a nonjudgmental and trusting relationship with the patient through active listening and therapeutic communication  - Assess patient's level of functioning, behavior and potential for risk  - Engage patient in 1 on 1 interactions  - Encourage patient to express fears, feelings, frustrations, and discuss symptoms    - [place] patient to reality, help patient recognize reality-based thinking   - Administer medications as ordered and assess for potential side effects  - Provide the patient education related to the signs and symptoms of the illness and desired effects of prescribed medications  Outcome: Progressing  Goal: Refrain from acting on delusional thinking/internal stimuli  Description: Interventions:  - Monitor patient closely, per order   - Utilize least restrictive measures   - Set reasonable limits, give positive feedback for acceptable   - Administer medications as ordered and monitor of potential side effects  Outcome: Progressing  Goal: Agree to be compliant with medication regime, as prescribed and report medication side effects  Description: Interventions:  - Offer appropriate PRN medication and supervise ingestion; conduct AIMS, as needed   Outcome: Progressing     Problem: Risk for Self Injury/Neglect  Goal: Treatment Goal: Remain safe during length of stay, learn and adopt new coping skills, and be free of self-injurious ideation, impulses and acts at the time of discharge  Outcome: Progressing  Goal: Refrain from harming self  Description: Interventions:  - Monitor patient closely, per order  - Develop a trusting relationship  - Supervise medication ingestion, monitor effects and side  effects   Outcome: Progressing     Problem: Risk for Violence/Aggression Toward Others  Goal: Treatment Goal: Refrain from acts of violence/aggression during length of stay, and demonstrate improved impulse control at the time of discharge  Outcome: Progressing  Goal: Refrain from destructive acts on the environment or property  Outcome: Progressing  Goal: Control angry outbursts  Description: Interventions:  - Monitor patient closely, per order  - Ensure early verbal de-escalation  - Monitor prn medication needs  - Set reasonable/therapeutic limits, outline behavioral expectations, and consequences   - Provide a non-threatening milieu, utilizing the least restrictive interventions   Outcome: Progressing   See chart note

## 2024-04-01 NOTE — PROGRESS NOTES
04/01/24   Team Meeting   Meeting Type Daily Rounds   Team Members Present   Team Members Present Physician;Nurse;Occupational Therapist;   Physician Team Member Dr Germán CORONADO; Yeison BURR   Nursing Team Member Jesús MATTHEWS   Social Work Team Member Raymond LEON   OT Team Member Yessy GALEAS   Patient/Family Present   Patient Present No   Patient's Family Present No     DC today to [facility] with Dr Carlos Manuel CORONADO psych follow up, declined need for pcp.
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
## 2024-04-01 NOTE — PROGRESS NOTES
04/01/24 0830   Activity/Group Checklist   Group   (Relapse Prevention Discussion and Planning)   Attendance Attended   Attendance Duration (min) 16-30   Interactions Interacted appropriately   Affect/Mood Appropriate;Bright   Goals Achieved Identified feelings;Identified triggers;Identified relapse prevention strategies;Discussed safety plan;Discussed coping strategies;Discussed discharge plans;Increased hopefulness       AT met with PT to discuss relapse prevention planning and PT completed the RP form with AT. PT reported that she feels excited and ready to go back home today and can't wait to see her staff.     Strengths and things worth living for include: her mom wants her to live, a new baby cousin is on the way and thankful for a friend  Triggers, stressors and situations that place me at risk for a crisis situation are: things people say ans do to her, her own behaviors and things she is worried about  Warning signs that indicate I may need help: staying in bed and wanting others to leave her alone  Coping skills I can use that help me calm down and keep me safe are: talking to people, take a walk, music  Family, friends, and organizations I can call for support: her , Britany, her mom Kiley and a friend

## 2024-04-01 NOTE — PROGRESS NOTES
"Progress Note - Yaritza Dunne 44 y.o. female MRN: [id number]    Unit/Bed#: [facility] 256-02 Encounter: [id number]        Subjective:   Patient seen and examined at bedside after reviewing the chart and discussing the case with the caring staff.      Patient examined at bedside.  Patient has no acute symptoms.     Patient is being discharged today, 4/1/24.     Physical Exam   Vitals: Blood pressure 114/78, pulse 85, temperature 97.9 °F (36.6 °C), temperature source Temporal, resp. rate 17, height 5' 8\" (1.727 m), weight 130 kg (287 lb), SpO2 98%.,Body mass index is 43.64 kg/m².  Constitutional: Patient in no acute distress.  HEENT: PERR, EOMI, MMM.  Cardiovascular: Normal rate and regular rhythm.    Pulmonary/Chest: Effort normal and breath sounds normal.   Abdomen: Soft, + BS, NT.  Skin: BLE edema.     Assessment/Plan:  Yaritza Dunne is a(n) 43 y.o. female with bipolar disorder.     Medical Clearance: Patient is medically cleared for discharge. All prescriptions have been sent to pharmacy.      Hypothyroidism.  Home:  levothyroxine 224 mcg M-F and 336 mcg on S/Sn.  TSH level <0.010, FT4 0.95 on 3/21/24.  Decrease levothyroxine 224 to 200 mcg daily. Repeat TSH <0.010, FT4 1.10 on 3/28/24.  F/u with PCP/endocrinology outpt.  Adrenocortical insufficiency.  Patient is on hydrocortisone 15 mg daily.  Epilepsy.  Continue Depakote nightly.  Dyslipidemia.  Patient is on fish oil 1 mg three times daily.  Lower extremity edema.  Lasix 20 mg daily.  Increase lasix to 40 mg daily 3/24.  Change diet to low sodium.  Continue to monitor.   Hypomagnesemia.  Continue mag oxide 400 mg twice daily.   Vitamin D deficiency.  Patient is on vitamin D3 2000 units daily.  Vitamin B12 deficiency.  Patient is on vitamin B12 1000 mcg daily.  Intellectual disability.  Supportive therapy.   Prediabetes.  Hgb A1c 6.0% on 3/21/24.  Lifestyle modifications.   Left anterior neck abrasions.  Self inflicted.  Apply Bactroban three times daily.   Left wrist and " ankle pain.  Had x-rays 3/20/24.  Left wrist x-ray showing no fracture or malalignment but remains skeletally immature.  Left ankle x-ray with no acute abnormality but has chronic deformity of ankle and foot as described with arthritic changes at the tibiotalar joint and prominent swelling.  Tylenol as needed.  Lidocaine patch daily.  Apply ice as needed.  PT OT consulted.    Dry eyes.  Start artifical tear drops twice daily.  Allergic rhinitis. Start Flonase daily.   Cutaneous candidiasis.  Apply Nystatin powder to affected areas twice daily.  Yeast infection.  Diflucan 150 mg x 1 dose 3/27.   UTI.  Start Macrobid 100 mg twice daily x 5 days (thru 4/1).   Callus.  Patient states she does see podiatry outpatient. They cut her nails. Encouraged to f/u with them.   Sore throat.  Add chloraseptic spray as needed 3/31.  Can request lozenges as needed.  Encouraged adequate hydration.     The patient was discussed with Dr. Arguelles and he is in agreement with the above note.

## 2024-04-01 NOTE — SOCIAL WORK
IMM explained to pt who expressed verbal confirmation of understanding. Declined right to appeal dc at this time, pt guardian/mother also agreeable to dc today per dc meeting Friday. Pt Signed IMM in scan bin to be entered into pt's EHR, copy provided to pt. Pt presents bright, appropriate, agreeable to dc to  with Dr Horowitz psych follow up in house.

## 2024-04-01 NOTE — PLAN OF CARE
Problem: Alteration in Thoughts and Perception  Goal: Treatment Goal: Gain control of psychotic behaviors/thinking, reduce/eliminate presenting symptoms and demonstrate improved reality functioning upon discharge  Outcome: Completed  Goal: Verbalize thoughts and feelings  Description: Interventions:  - Promote a nonjudgmental and trusting relationship with the patient through active listening and therapeutic communication  - Assess patient's level of functioning, behavior and potential for risk  - Engage patient in 1 on 1 interactions  - Encourage patient to express fears, feelings, frustrations, and discuss symptoms    - [place] patient to reality, help patient recognize reality-based thinking   - Administer medications as ordered and assess for potential side effects  - Provide the patient education related to the signs and symptoms of the illness and desired effects of prescribed medications  Outcome: Completed  Goal: Refrain from acting on delusional thinking/internal stimuli  Description: Interventions:  - Monitor patient closely, per order   - Utilize least restrictive measures   - Set reasonable limits, give positive feedback for acceptable   - Administer medications as ordered and monitor of potential side effects  Outcome: Completed  Goal: Agree to be compliant with medication regime, as prescribed and report medication side effects  Description: Interventions:  - Offer appropriate PRN medication and supervise ingestion; conduct AIMS, as needed   Outcome: Completed  Goal: Recognize dysfunctional thoughts, communicate reality-based thoughts at the time of discharge  Description: Interventions:  - Provide medication and psycho-education to assist patient in compliance and developing insight into his/her illness   Outcome: Completed     Problem: Ineffective Coping  Goal: Demonstrates healthy coping skills  Outcome: Completed  Goal: Participates in unit activities  Description: Interventions:  - Provide  therapeutic environment   - Provide required programming   - Redirect inappropriate behaviors   Outcome: Completed  Goal: Patient/Family participate in treatment and DC plans  Description: Interventions:  - Provide therapeutic environment  Outcome: Completed     Problem: Risk for Self Injury/Neglect  Goal: Treatment Goal: Remain safe during length of stay, learn and adopt new coping skills, and be free of self-injurious ideation, impulses and acts at the time of discharge  Outcome: Completed  Goal: Verbalize thoughts and feelings  Description: Interventions:  - Assess and re-assess patient's lethality and potential for self-injury  - Engage patient in 1:1 interactions, daily, for a minimum of 15 minutes  - Encourage patient to express feelings, fears, frustrations, hopes  - Establish rapport/trust with patient   Outcome: Completed  Goal: Refrain from harming self  Description: Interventions:  - Monitor patient closely, per order  - Develop a trusting relationship  - Supervise medication ingestion, monitor effects and side effects   Outcome: Completed  Goal: Complete daily ADLs, including personal hygiene independently, as able  Description: Interventions:  - Observe, teach, and assist patient with ADLS  - Monitor and promote a balance of rest/activity, with adequate nutrition and elimination  Outcome: Completed     Problem: Risk for Violence/Aggression Toward Others  Goal: Treatment Goal: Refrain from acts of violence/aggression during length of stay, and demonstrate improved impulse control at the time of discharge  Outcome: Completed  Goal: Refrain from harming others  Outcome: Completed  Goal: Refrain from destructive acts on the environment or property  Outcome: Completed  Goal: Control angry outbursts  Description: Interventions:  - Monitor patient closely, per order  - Ensure early verbal de-escalation  - Monitor prn medication needs  - Set reasonable/therapeutic limits, outline behavioral expectations, and  consequences   - Provide a non-threatening milieu, utilizing the least restrictive interventions   Outcome: Completed     Problem: DISCHARGE PLANNING - CARE MANAGEMENT  Goal: Discharge to post-acute care or home with appropriate resources  Description: INTERVENTIONS:  - Conduct assessment to determine patient/family and health care team treatment goals, and need for post-acute services based on payer coverage, community resources, and patient preferences, and barriers to discharge  - Address psychosocial, clinical, and financial barriers to discharge as identified in assessment in conjunction with the patient/family and health care team  - Arrange appropriate level of post-acute services according to patient’s   needs and preference and payer coverage in collaboration with the physician and health care team  - Communicate with and update the patient/family, physician, and health care team regarding progress on the discharge plan  - Arrange appropriate transportation to post-acute venues  Outcome: Completed     Problem: Ineffective Coping  Goal: Participates in unit activities  Description: Interventions:  - Provide therapeutic environment   - Provide required programming   - Redirect inappropriate behaviors   Outcome: Completed     Problem: Nutrition/Hydration-ADULT  Goal: Nutrient/Hydration intake appropriate for improving, restoring or maintaining nutritional needs  Description: Monitor and assess patient's nutrition/hydration status for malnutrition. Collaborate with interdisciplinary team and initiate plan and interventions as ordered.  Monitor patient's weight and dietary intake as ordered or per policy. Utilize nutrition screening tool and intervene as necessary. Determine patient's food preferences and provide high-protein, high-caloric foods as appropriate.     INTERVENTIONS:  - Monitor oral intake, urinary output, labs, and treatment plans  - Assess nutrition and hydration status and recommend course of  action  - Evaluate amount of meals eaten  - Assist patient with eating if necessary   - Allow adequate time for meals  - Recommend/ encourage appropriate diets, oral nutritional supplements, and vitamin/mineral supplements  - Order, calculate, and assess calorie counts as needed  - Recommend, monitor, and adjust tube feedings and TPN/PPN based on assessed needs  - Assess need for intravenous fluids  - Provide specific nutrition/hydration education as appropriate  - Include patient/family/caregiver in decisions related to nutrition  Outcome: Completed

## 2024-04-01 NOTE — BH TRANSITION RECORD
Contact Information: If you have any questions, concerns, pended studies, tests and/or procedures, or emergencies regarding your inpatient behavioral health visit. Please contact [facility] # [phone] and ask to speak to a , nurse or physician. A contact is available 24 hours/ 7 days a week at this number.     Summary of Procedures Performed During your Stay:  Below is a list of major procedures performed during your hospital stay and a summary of results:  - No major procedures performed.    Pending Studies (From admission, onward)      None          Please follow up on the above pending studies with your PCP and/or referring provider.

## 2024-04-01 NOTE — NURSING NOTE
"Patient has been visible on the unit.  Spent most of the evening in the DR.   Denies s/I.  Reports she feels improvement over when she was first admitted.  With prompting, she says she feels \"calmer\"  Patient has an underlying irritable edge. Became very upset after she laid down due to another patient talking to himself in the hallway.  Becomes frustrated easily.  Looking around frequently during assessment in   "
"Patient observed wandering within the halls. During assessment she appears irritable and distracted, states she has been admitted due to \"med changes\". [place], childlike, and minimal with responses. Makes needs known, although repetitive and frustrated with less than instant gratification. Admits after a pause that she had an \"outburst\" at her group home, when asked what made her angry she did not reply. She denies current thoughts or urges to hurt herself or others. 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 Appears internally preoccupied. Q7 minute checks ongoing.   "
"Patient withdrawn to room. Patient inc of urine x 2. Pleasant and cooperative. Denies SI,HI,AVH, anxiety and depression. Patient ate snack in her room. No c/o of nausea, patient states \" I just don't feel good\". Will monitor.  "
Bleps assessment completed.  
Patient  visible on milieu.Behavior  improved.Childlike.Pleasant and  cooperative.  Less intrusive.Mumble  speech.Disorganize.Disheveled.Schedule Po medication as ordered.Denies  anxiety depression hallucination HI SI.Appetite  good.Continue  on safety checks.  
Patient  was visible in the unit, attended group with full participation. She relates with selected staff and peer. Mood was even and affect is labile, she denies SI HI AVH, she took her medication without hesitation. Q-7 minutes safety check maintained and continue   
Patient discharged unit walking with all belongings. Patient states she is happy to leave and denies any SI. Reviewed AVS with patient patient verbalized understanding.   
Patient had interrupted sleep due to upcoming 303 hearing. Staff maintained Q 7 safety checks.   
Patient has been observed sleeping for the majority of the night. Non-labored breathing and no signs of distress noted.  Q7 minute checks maintained for pt safety.   
Patient has been observed sleeping for the majority of the night. Non-labored breathing and no signs of distress noted.  Q7 minute checks maintained for pt safety.   
Patient has been visible on the unit.  Mood is labile.  Behaviors childlike.  Patient more needy this evening than she had been last night.  Easily upset over small things.  Patient given reassurance and time to process her feelings.  Spent some time briefly in room 243.  Patient was able to remain in control of herself despite some periods of upset  
Patient med and meal compliant visible on unit calm pleasant cooperative. Patient denies SI at this time, stated she does not like feeling left out, that the girls at the group home made her feel that way and it made her angry. Denies SI HI AVH anxiety and depression Safety checks maintained.  
Patient observed sleeping comfortably for majority of q7 minute monitoring throughout the night, without demonstrating any signs or symptoms of distress. No acute behaviors overnight. Unlabored even breaths noted. Will remain on safety precautions and continual q7 minute monitoring.  
Patient observed sleeping during the night. Non labored breathing noted. No distress or behaviors. Safety checks continue.  
Patient slept two to three hours.  Tearful at times.  Feels people at her GH are mad at her.  Thinks people are angry with her on the unit.  Reassurance provided.  Patient remained in her room and used her coping skills for the majority of the night  
Patient slept well during Q 7 minute checks. Easy non labored breathing noted.No changes in medical condition. No behaviors noted. Monitoring continues.  
Patient slept well during Q 7 minute checks. Easy non labored breathing noted.No changes in medical condition. No behaviors noted. Monitoring continues.  
Patient started the evening in a pleasant mood..  Reports she spent most of the day in her room to avoid stimulation and conflict with peers.  Patient coloring in her room.  Reports feeling tired due to poor sleep last night.  Later in the evening she became upset and anxious related to a phone call..  Hung up on her mother. Also upset that she could not meet her new roommate while she was being admitted.  Patient agreeable to prn medication.  Given 1mg ativan po at 2123 for frankel score of 30.  Will monitor for effectiveness  
Patient visible  on  milieu.Pleasant  and cooperative. Able to need known to staff. Disheveled.Mumble  speech. Flat affect. Disorganize. Childlike. Behavior controlled.Attend  group.Schedule PO medication as ordered. Denies anxiety , depression, hallucination HI SI.Utilize coping skills . Appetite  good. Continue on  safety checks.  
Patient visible intermittently on unit. Patient withdrawn to room. Patient calm and cooperative. No talk of discharge this shift. No c/o of nausea or vomiting. Denies SI,HI,AVH, anxiety and depression. Safety checks continue Q 7 minutes.  
Patient visible on  milieu.Pleasant  and cooperative.Childlike.Soft slow  speech. Labile .Flat  affect Report she was  angry with her group home  staff , but she know that was not  good andshe's ready to go home.Schedule PO medication administered as ordered.Denies anxiety, depression,hallucination, HI, SI.Utilize coping  skills by cleaning room.Appetite good.Attend group.Continue on safety check    
Patient visible on milieu.Childlike. Disheveled.Restless. Flat affect. Disorganized.Somatic. Schedule Po medication administered as ordered.Denies  hallucination HI SI.Good  appetite.Continue  on  safety check.  
Patient visible on milieu.Childlike.Labile.Disorganize.Disheveled.Appears  internally preoccupied.Schedule PO medication as  ordered.Denies HI. SI. Continue  on  safety check.  
Patient visible on the unit intermittently, pleasant & cooperative. Continues to endorse intermittent nausea, patient ate meals in room. Patient continues to be focused on discharge. Taking medications as prescribed. Q 7 minute checks maintained.   
Patient visible on the unit intermittently. Focused on discharge, patient had outburst in AM about not being able to be home for her birthday, patient redirected to her room. Able to calm down & took a nap. Patient can be intrusive at times. Patient c/o vomiting & diarrhea, Dr. Arguelles contacted for imodium & Zofran. Focused on going to ED for N/V/D, pateint redirected by staff that she has PRN medications for N/V/D & offered ginger ale & crackers. Vital signs WNL. Taking medications as prescribed. Denies SI/HI/AVH. Q 7 minute checks maintained.   
Patient visible on the unit, social with peers. Patient tearful this afternoon missing a peer who discharged but felt better after another peer involved her in coloring & participated in group. Patient medication compliant, excited for discharge. Denies SI/HI/AVH. Q 7 minute checks maintained.   
Patient visible on unit. Social with staff and peers. Medication compliant. Denies SI,HI,AVH, anxiety or depression. Safety checks continue Q 7 minutes.  
Patient visible on unit.Alert and Oriented.Able to make needs known to staff.Cooperative with care.Schedule PO medication administered as ordered.Denies hallucination, HI,SI.Appetite good.Attend group.Remain fixated on discharge.Continue on safety check.  
Patient was out in community, calm ,mood anxious,blunted but cooperative. Patient complied with medications and meals, denies SI/HI/AVH and was pleasant upon approach.Staff maintained Q 7 safety checks, administered medications as prescribed and assisted as needed. We will continue to monitor , support and encourage,  
Patient was out in community, calm and  cooperative, complied with meals and  medications.Patient attended group activities and was pleasant on approach. Staff maintained Q 7 safety checks, administered medications as prescribed and assisted as needed.We will continue to monitor,support and encourage  
Patient was visible in milieu, mostly withdrawn with fewer peer interactions. Patient was labile, complied with medications and meals, denies SI/HI/AVH, focused on discharge. Staff maintained Q 7 safety checks, administered medications as prescribed and assisted as needed. We will continue to monitor, support and encourage.  
Patient was visible in milieu, spent most of the in dinning room drawing, withdrawn  with minimal peer interactions. Patient was calm, cooperative and compliant with medications and meals. Patient denies SI/HI/AVH. Staff maintained,Q 7 safety checks, administered medications as prescribed and assisted as needed. We will continue to monitor, support and encourage.  
Patient was visible in milieu,calm but childlike behavior, somatic and anxious. Patient complied with medications and meals, denies SI,HI,AVH. Staff maintained Q 7 safety checks, administered medications as prescribed and assisted as needed. We will continue to monitor, assist and support as needed.  
Previously administered prn ativan effective.  Patient appears to be sleeping  
Pt admitted to unit via EMS from [place] [facility] approx. 1528. Ambulated onto unit, refused shower. Cooperative with admission database, very childlike behavior. Reports getting upset at group home when she then began to have an 'outburst' by throwing chairs and garbage cans, attempted to cut her neck with a shard of glass. Small superficial abrasion to left anterior neck noted, open to air with no intervention required. Pt has poor insight yet is able to tell me she is here to get her medications in order but wants to return back to group home before her birthday. Denies depression, reports moderate anxiety mostly accompanied with anger. Fixated on her baby doll and coloring books and supplies that were brought from home. Coloring supplies given to pt to keep in room, able to verbalize understanding. Oriented to milieu, educated on appropriate coping skills and when to approach nursing staff if further assistance was needed. Pt then reported to day room and began interacting with peers while coloring and listening to music. 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 Zhou and [facility] provider made aware via [employer] that pt PTA med list has been completed by this RN. 7 minute safety rounds and fall precautions initiated.  
Pt came in with the following belongings:    Remains with Pt:  X1 pair of socks  X1 t-shirt  X1 bra   X1 underwear  X1 coloring book  X coloring sheets  X crayons (in a cup)  X1 sweat pants  X1 sweatshirt with patel cut off    Storage:  X1 speaker  X1 hand   X1 stapler  X19 pens  X1 drawstring bag  X1 box of oil pastels   X1 lotion  X2 packs of wipes   X1 pad  X1 baby doll  X1 hair brush  X1 tablet   X2 chargers   X1 bracelet   X the patel to the the sweatshirt   X1 quilt   
Pt found to be resting quietly on most q 7 min checks. No acute behavioral issues noted. Q 7 min checks ongoing.   
Pt was observed laying on bed with eyes closed, normal breathing pattern. Staff maintained Q 7 security checks.We will continue to monitor and assist as needed  
Refuse  Bleps assessment   
Xray Chest 1 View- PORTABLE-Urgent

## 2024-04-13 ENCOUNTER — OFFICE VISIT (OUTPATIENT)
Dept: URGENT CARE | Facility: CLINIC | Age: 44
End: 2024-04-13
Payer: MEDICARE

## 2024-04-13 VITALS
OXYGEN SATURATION: 96 % | HEART RATE: 81 BPM | DIASTOLIC BLOOD PRESSURE: 93 MMHG | TEMPERATURE: 96.1 F | RESPIRATION RATE: 16 BRPM | SYSTOLIC BLOOD PRESSURE: 140 MMHG

## 2024-04-13 DIAGNOSIS — R39.9 UTI SYMPTOMS: Primary | ICD-10-CM

## 2024-04-13 LAB
SL AMB  POCT GLUCOSE, UA: NORMAL
SL AMB LEUKOCYTE ESTERASE,UA: NORMAL
SL AMB POCT BILIRUBIN,UA: NORMAL
SL AMB POCT BLOOD,UA: NORMAL
SL AMB POCT CLARITY,UA: CLEAR
SL AMB POCT COLOR,UA: NORMAL
SL AMB POCT KETONES,UA: NORMAL
SL AMB POCT NITRITE,UA: NORMAL
SL AMB POCT PH,UA: 7.5
SL AMB POCT SPECIFIC GRAVITY,UA: 1
SL AMB POCT URINE PROTEIN: NORMAL
SL AMB POCT UROBILINOGEN: 0.2

## 2024-04-13 PROCEDURE — 81002 URINALYSIS NONAUTO W/O SCOPE: CPT | Performed by: PHYSICIAN ASSISTANT

## 2024-04-13 PROCEDURE — G0463 HOSPITAL OUTPT CLINIC VISIT: HCPCS | Performed by: PHYSICIAN ASSISTANT

## 2024-04-13 PROCEDURE — 87086 URINE CULTURE/COLONY COUNT: CPT | Performed by: PHYSICIAN ASSISTANT

## 2024-04-13 PROCEDURE — 99213 OFFICE O/P EST LOW 20 MIN: CPT | Performed by: PHYSICIAN ASSISTANT

## 2024-04-13 RX ORDER — HYDROCORTISONE 10 MG/1
TABLET ORAL
COMMUNITY
Start: 2024-03-14

## 2024-04-13 RX ORDER — LEVOTHYROXINE SODIUM 0.1 MG/1
TABLET ORAL
COMMUNITY
Start: 2024-04-11

## 2024-04-13 RX ORDER — TRIAMCINOLONE ACETONIDE 0.25 MG/G
CREAM TOPICAL
COMMUNITY
Start: 2024-04-01

## 2024-04-13 RX ORDER — NITROFURANTOIN 25; 75 MG/1; MG/1
100 CAPSULE ORAL 2 TIMES DAILY
Qty: 14 CAPSULE | Refills: 0 | Status: SHIPPED | OUTPATIENT
Start: 2024-04-13 | End: 2024-04-20

## 2024-04-13 RX ORDER — CHOLECALCIFEROL (VITAMIN D3) 50 MCG
CAPSULE ORAL
COMMUNITY
Start: 2024-04-11

## 2024-04-13 RX ORDER — MAGNESIUM OXIDE 400 MG/1
TABLET ORAL
COMMUNITY
Start: 2024-04-11

## 2024-04-13 RX ORDER — CALCIUM CARBONATE/VITAMIN D3 600 MG-10
TABLET ORAL
COMMUNITY
Start: 2024-03-14

## 2024-04-13 NOTE — PROGRESS NOTES
[facility]        NAME: Yaritza Dunne is a 44 y.o. female  : 1980    MRN: [id number]  DATE: 2024  TIME: 11:55 AM    Assessment and Plan   UTI symptoms [R39.9]  1. UTI symptoms  POCT urine dip    Urine culture    nitrofurantoin (MACROBID) 100 mg capsule            Patient Instructions   Discussed UA unremarkable.  Will call in Macrobid since patient is symptomatic and send urine for culture.  I advised group home representative that patient need to follow-up with OB/GYN for further evaluation and treatment of vaginal itching and discharge.  We discussed that we do not do vaginal exams here at the urgent care and there are multiple causes of vaginal itching including yeast infection and BV and I am unable to determine what the cause of vaginal itching is.   If tests have been performed at [facility], our office will contact you with results if changes need to be made to the care plan discussed with you at the visit.  You can review your full results on [facility]  Follow up with PCP in 3-5 days.  Proceed to  ER if symptoms worsen.    Chief Complaint     Chief Complaint   Patient presents with    Possible UTI     STARTED A FEW DAYS AGO, BURNING AND ITCHING FOR ABOUT 4-5 DAYS AND LEAKING URINE         History of Present Illness       HPI  This is a 44-year-old female here with her group home representative complaining of burning with peeing, frequency, urgency and urinary accidents for the last 5 days.  She notes she has a history of UTIs and when this occurs she has urinary accidents.  She complains of abdominal pressure.  She notes some vaginal itching with yellow vaginal discharge.  She notes there is an odor but she is unsure of the smell.  She notes she had 2 episodes of vomiting this morning but denies any blood in the vomit.  She denies fever, abdominal pain, back pain, diarrhea, shortness of breath or chest pain.  She denies being sexually active.  Review of Systems   Review of  Systems   Constitutional:  Negative for fever.   Respiratory:  Negative for shortness of breath.    Cardiovascular:  Negative for chest pain.   Gastrointestinal:  Positive for vomiting. Negative for abdominal pain and diarrhea.   Genitourinary:  Positive for vaginal discharge. Negative for dysuria, flank pain, frequency and urgency.         Current Medications       Current Outpatient Medications:     acetaminophen (TYLENOL) 325 mg tablet, Take 650 mg by mouth every 4 (four) hours as needed for mild pain, Disp: , Rfl:     albuterol (PROVENTIL HFA,VENTOLIN HFA) 90 mcg/act inhaler, Inhale 2 puffs every 6 (six) hours as needed for wheezing, Disp: , Rfl:     ARIPiprazole (ABILIFY) 15 mg tablet, Take 1 tablet (15 mg total) by mouth daily, Disp: 30 tablet, Rfl: 0    Calcium 600-D 600-10 MG-MCG TABS, , Disp: , Rfl:     Calcium Carb-Cholecalciferol (calcium carbonate-vitamin D) 500 mg-5 mcg tablet, Take 1 tablet by mouth 2 (two) times a day with meals, Disp: 60 tablet, Rfl: 0    Cholecalciferol (Vitamin D) 50 MCG (2000 UT) tablet, Take 1 tablet (2,000 Units total) by mouth daily, Disp: 30 tablet, Rfl: 0    clonazePAM (KlonoPIN) 0.5 mg tablet, Take 1 tablet (0.5 mg total) by mouth 2 (two) times a day, Disp: 20 tablet, Rfl: 2    cyanocobalamin (VITAMIN B-12) 1000 MCG tablet, Take 1 tablet (1,000 mcg total) by mouth daily, Disp: 30 tablet, Rfl: 0    D3 Super Strength 50 MCG (2000 UT) CAPS, , Disp: , Rfl:     divalproex sodium (DEPAKOTE ER) 500 mg 24 hr tablet, Take 3 tablets (1,500 mg total) by mouth daily at bedtime, Disp: 90 tablet, Rfl: 0    docusate sodium (COLACE) 100 mg capsule, Take 1 capsule (100 mg total) by mouth daily, Disp: 30 capsule, Rfl: 0    furosemide (LASIX) 40 mg tablet, Take 1 tablet (40 mg total) by mouth daily, Disp: 30 tablet, Rfl: 0    hydrocortisone (CORTEF) 10 mg tablet, , Disp: , Rfl:     hydrocortisone (CORTEF) 5 mg tablet, Take 3 tablets (15 mg total) by mouth daily, Disp: 90 tablet, Rfl: 0     levothyroxine 100 mcg tablet, , Disp: , Rfl:     levothyroxine 112 mcg tablet, Take 3 tablets (336 mcg total) by mouth 2 (two) times a week On Saturday and Sunday, Disp: 30 tablet, Rfl: 0    levothyroxine 200 mcg tablet, Take 1 tablet (200 mcg total) by mouth daily Take 2 tablets by mouth Monday-Friday, Disp: 30 tablet, Rfl: 0    lidocaine (Lidoderm) 5 %, Apply 1 patch topically over 12 hours daily as needed (pain) Remove & Discard patch within 12 hours or as directed by MD, Disp: 30 patch, Rfl: 0    magnesium oxide (MAG-OX) 400 mg tablet, , Disp: , Rfl:     magnesium Oxide (MAG-OX) 400 mg TABS, Take 1 tablet (400 mg total) by mouth 2 (two) times a day, Disp: 60 tablet, Rfl: 0    meclizine (ANTIVERT) 25 mg tablet, Take 1 tablet (25 mg total) by mouth every 12 (twelve) hours as needed for dizziness, Disp: 30 tablet, Rfl: 0    meloxicam (MOBIC) 15 mg tablet, Take 1 tablet (15 mg total) by mouth daily as needed for moderate pain, Disp: 30 tablet, Rfl: 0    Multiple Vitamin (multivitamin) tablet, Take 1 tablet by mouth daily, Disp: 30 tablet, Rfl: 0    nitrofurantoin (MACROBID) 100 mg capsule, Take 1 capsule (100 mg total) by mouth 2 (two) times a day for 7 days, Disp: 14 capsule, Rfl: 0    nystatin (MYCOSTATIN) powder, Apply topically 2 (two) times a day, Disp: 30 g, Rfl: 0    Omega-3 Fatty Acids (fish oil) 1,000 mg, Take 1 capsule (1,000 mg total) by mouth 3 (three) times a day, Disp: 90 capsule, Rfl: 0    sertraline (ZOLOFT) 100 mg tablet, Take 1 tablet (100 mg total) by mouth daily, Disp: 30 tablet, Rfl: 0    triamcinolone (KENALOG) 0.025 % cream, , Disp: , Rfl:     triamcinolone (KENALOG) 0.1 % cream, Apply topically 2 (two) times a day as needed (ezcema), Disp: 15 g, Rfl: 0    Current Allergies     Allergies as of 04/13/2024 - Reviewed 04/13/2024   Allergen Reaction Noted    Haldol [haloperidol] Other (See Comments) 01/29/2020    Thioridazine  01/29/2020            The following portions of the patient's history  were reviewed and updated as appropriate: allergies, current medications, past family history, past medical history, past social history, past surgical history and problem list.     Past Medical History:   Diagnosis Date    Adrenocortical insufficiency (HCC)     Bipolar disorder (HCC)     CVA (cerebral vascular accident) (HCC)     Disease of thyroid gland     hypo    Disorder of bone density and structure, unspecified     Epilepsy (HCC)     Hyperlipidemia     Hypopituitarism (HCC)     Mild intellectual disabilities     Psychiatric disorder     bipolar       Past Surgical History:   Procedure Laterality Date    EPIDURAL BLOCK INJECTION N/A 8/23/2023    Procedure: L3-L4  LUMBAR epidural steroid injection (69346);  Surgeon: Adam Hayden DO;  Location: [facility] MAIN OR;  Service: Pain Management        No family history on file.      Medications have been verified.        Objective   /93   Pulse 81   Temp (!) 96.1 °F (35.6 °C)   Resp 16   SpO2 96%        Physical Exam     Physical Exam  Vitals and nursing note reviewed.   Constitutional:       Appearance: Normal appearance.   Cardiovascular:      Rate and Rhythm: Normal rate and regular rhythm.   Pulmonary:      Effort: Pulmonary effort is normal.      Breath sounds: Normal breath sounds.   Abdominal:      Palpations: Abdomen is soft.      Tenderness: There is no abdominal tenderness. There is no right CVA tenderness, left CVA tenderness or guarding.   Neurological:      Mental Status: She is alert.

## 2024-04-14 LAB — BACTERIA UR CULT: NORMAL

## 2024-07-18 ENCOUNTER — APPOINTMENT (EMERGENCY)
Dept: RADIOLOGY | Facility: HOSPITAL | Age: 44
End: 2024-07-18
Payer: MEDICARE

## 2024-07-18 ENCOUNTER — HOSPITAL ENCOUNTER (EMERGENCY)
Facility: HOSPITAL | Age: 44
Discharge: HOME | End: 2024-07-18
Attending: EMERGENCY MEDICINE
Payer: MEDICARE

## 2024-07-18 ENCOUNTER — APPOINTMENT (EMERGENCY)
Dept: CT IMAGING | Facility: HOSPITAL | Age: 44
End: 2024-07-18
Payer: MEDICARE

## 2024-07-18 VITALS
TEMPERATURE: 97.8 F | OXYGEN SATURATION: 99 % | BODY MASS INDEX: 43.49 KG/M2 | WEIGHT: 286 LBS | DIASTOLIC BLOOD PRESSURE: 80 MMHG | SYSTOLIC BLOOD PRESSURE: 124 MMHG | HEART RATE: 77 BPM | RESPIRATION RATE: 18 BRPM

## 2024-07-18 DIAGNOSIS — R19.7 DIARRHEA: ICD-10-CM

## 2024-07-18 DIAGNOSIS — R10.9 ABDOMINAL PAIN: Primary | ICD-10-CM

## 2024-07-18 LAB
ALBUMIN SERPL BCG-MCNC: 3.8 G/DL (ref 3.5–5)
ALP SERPL-CCNC: 69 U/L (ref 34–104)
ALT SERPL W P-5'-P-CCNC: 21 U/L (ref 7–52)
ANION GAP SERPL CALCULATED.3IONS-SCNC: 7 MMOL/L (ref 4–13)
APTT PPP: 24 SECONDS (ref 23–37)
AST SERPL W P-5'-P-CCNC: 18 U/L (ref 13–39)
ATRIAL RATE: 67 BPM
BACTERIA UR QL AUTO: ABNORMAL /HPF
BASOPHILS # BLD AUTO: 0.02 THOUSANDS/ÂΜL (ref 0–0.1)
BASOPHILS NFR BLD AUTO: 0 % (ref 0–1)
BILIRUB SERPL-MCNC: 0.34 MG/DL (ref 0.2–1)
BILIRUB UR QL STRIP: NEGATIVE
BUN SERPL-MCNC: 20 MG/DL (ref 5–25)
CALCIUM SERPL-MCNC: 9.3 MG/DL (ref 8.4–10.2)
CARDIAC TROPONIN I PNL SERPL HS: 4 NG/L
CHLORIDE SERPL-SCNC: 103 MMOL/L (ref 96–108)
CLARITY UR: CLEAR
CO2 SERPL-SCNC: 30 MMOL/L (ref 21–32)
COLOR UR: YELLOW
CREAT SERPL-MCNC: 0.65 MG/DL (ref 0.6–1.3)
EOSINOPHIL # BLD AUTO: 0.03 THOUSAND/ÂΜL (ref 0–0.61)
EOSINOPHIL NFR BLD AUTO: 0 % (ref 0–6)
ERYTHROCYTE [DISTWIDTH] IN BLOOD BY AUTOMATED COUNT: 13 % (ref 11.6–15.1)
GFR SERPL CREATININE-BSD FRML MDRD: 107 ML/MIN/1.73SQ M
GLUCOSE SERPL-MCNC: 97 MG/DL (ref 65–140)
GLUCOSE UR STRIP-MCNC: NEGATIVE MG/DL
HCG SERPL QL: NEGATIVE
HCT VFR BLD AUTO: 40.1 % (ref 34.8–46.1)
HGB BLD-MCNC: 13.3 G/DL (ref 11.5–15.4)
HGB UR QL STRIP.AUTO: NEGATIVE
IMM GRANULOCYTES # BLD AUTO: 0.04 THOUSAND/UL (ref 0–0.2)
IMM GRANULOCYTES NFR BLD AUTO: 1 % (ref 0–2)
INR PPP: 0.94 (ref 0.84–1.19)
KETONES UR STRIP-MCNC: ABNORMAL MG/DL
LEUKOCYTE ESTERASE UR QL STRIP: ABNORMAL
LYMPHOCYTES # BLD AUTO: 3.59 THOUSANDS/ÂΜL (ref 0.6–4.47)
LYMPHOCYTES NFR BLD AUTO: 46 % (ref 14–44)
MCH RBC QN AUTO: 28.6 PG (ref 26.8–34.3)
MCHC RBC AUTO-ENTMCNC: 33.2 G/DL (ref 31.4–37.4)
MCV RBC AUTO: 86 FL (ref 82–98)
MONOCYTES # BLD AUTO: 0.59 THOUSAND/ÂΜL (ref 0.17–1.22)
MONOCYTES NFR BLD AUTO: 8 % (ref 4–12)
MUCOUS THREADS UR QL AUTO: ABNORMAL
NEUTROPHILS # BLD AUTO: 3.44 THOUSANDS/ÂΜL (ref 1.85–7.62)
NEUTS SEG NFR BLD AUTO: 45 % (ref 43–75)
NITRITE UR QL STRIP: NEGATIVE
NON-SQ EPI CELLS URNS QL MICRO: ABNORMAL /HPF
NRBC BLD AUTO-RTO: 0 /100 WBCS
P AXIS: 21 DEGREES
PH UR STRIP.AUTO: 7 [PH]
PLATELET # BLD AUTO: 196 THOUSANDS/UL (ref 149–390)
PMV BLD AUTO: 10.5 FL (ref 8.9–12.7)
POTASSIUM SERPL-SCNC: 3.9 MMOL/L (ref 3.5–5.3)
PR INTERVAL: 164 MS
PROT SERPL-MCNC: 6.3 G/DL (ref 6.4–8.4)
PROT UR STRIP-MCNC: ABNORMAL MG/DL
PROTHROMBIN TIME: 13.1 SECONDS (ref 11.6–14.5)
QRS AXIS: 86 DEGREES
QRSD INTERVAL: 100 MS
QT INTERVAL: 410 MS
QTC INTERVAL: 433 MS
RBC # BLD AUTO: 4.65 MILLION/UL (ref 3.81–5.12)
RBC #/AREA URNS AUTO: ABNORMAL /HPF
SODIUM SERPL-SCNC: 140 MMOL/L (ref 135–147)
SP GR UR STRIP.AUTO: 1.03 (ref 1–1.03)
T WAVE AXIS: 77 DEGREES
UROBILINOGEN UR STRIP-ACNC: 2 MG/DL
VENTRICULAR RATE: 67 BPM
WBC # BLD AUTO: 7.71 THOUSAND/UL (ref 4.31–10.16)
WBC #/AREA URNS AUTO: ABNORMAL /HPF

## 2024-07-18 PROCEDURE — 99284 EMERGENCY DEPT VISIT MOD MDM: CPT

## 2024-07-18 PROCEDURE — 85025 COMPLETE CBC W/AUTO DIFF WBC: CPT

## 2024-07-18 PROCEDURE — 81001 URINALYSIS AUTO W/SCOPE: CPT

## 2024-07-18 PROCEDURE — 96365 THER/PROPH/DIAG IV INF INIT: CPT

## 2024-07-18 PROCEDURE — 74177 CT ABD & PELVIS W/CONTRAST: CPT

## 2024-07-18 PROCEDURE — 84703 CHORIONIC GONADOTROPIN ASSAY: CPT

## 2024-07-18 PROCEDURE — 87086 URINE CULTURE/COLONY COUNT: CPT

## 2024-07-18 PROCEDURE — 93010 ELECTROCARDIOGRAM REPORT: CPT | Performed by: INTERNAL MEDICINE

## 2024-07-18 PROCEDURE — 85610 PROTHROMBIN TIME: CPT

## 2024-07-18 PROCEDURE — 96366 THER/PROPH/DIAG IV INF ADDON: CPT

## 2024-07-18 PROCEDURE — 93005 ELECTROCARDIOGRAM TRACING: CPT

## 2024-07-18 PROCEDURE — 99285 EMERGENCY DEPT VISIT HI MDM: CPT

## 2024-07-18 PROCEDURE — 71046 X-RAY EXAM CHEST 2 VIEWS: CPT

## 2024-07-18 PROCEDURE — 80053 COMPREHEN METABOLIC PANEL: CPT

## 2024-07-18 PROCEDURE — 36415 COLL VENOUS BLD VENIPUNCTURE: CPT

## 2024-07-18 PROCEDURE — 96375 TX/PRO/DX INJ NEW DRUG ADDON: CPT

## 2024-07-18 PROCEDURE — 84484 ASSAY OF TROPONIN QUANT: CPT

## 2024-07-18 PROCEDURE — 85730 THROMBOPLASTIN TIME PARTIAL: CPT

## 2024-07-18 RX ORDER — DICYCLOMINE HCL 20 MG
20 TABLET ORAL ONCE
Status: COMPLETED | OUTPATIENT
Start: 2024-07-18 | End: 2024-07-18

## 2024-07-18 RX ORDER — DICYCLOMINE HCL 20 MG
20 TABLET ORAL 2 TIMES DAILY
Qty: 20 TABLET | Refills: 0 | Status: SHIPPED | OUTPATIENT
Start: 2024-07-18

## 2024-07-18 RX ORDER — MAGNESIUM HYDROXIDE/ALUMINUM HYDROXICE/SIMETHICONE 120; 1200; 1200 MG/30ML; MG/30ML; MG/30ML
30 SUSPENSION ORAL ONCE
Status: COMPLETED | OUTPATIENT
Start: 2024-07-18 | End: 2024-07-18

## 2024-07-18 RX ORDER — ONDANSETRON 4 MG/1
4 TABLET, ORALLY DISINTEGRATING ORAL EVERY 6 HOURS PRN
Qty: 12 TABLET | Refills: 0 | Status: SHIPPED | OUTPATIENT
Start: 2024-07-18

## 2024-07-18 RX ORDER — ONDANSETRON 2 MG/ML
4 INJECTION INTRAMUSCULAR; INTRAVENOUS ONCE
Status: COMPLETED | OUTPATIENT
Start: 2024-07-18 | End: 2024-07-18

## 2024-07-18 RX ADMIN — DICYCLOMINE HYDROCHLORIDE 20 MG: 20 TABLET ORAL at 02:20

## 2024-07-18 RX ADMIN — IOHEXOL 100 ML: 350 INJECTION, SOLUTION INTRAVENOUS at 04:28

## 2024-07-18 RX ADMIN — SODIUM CHLORIDE, SODIUM LACTATE, POTASSIUM CHLORIDE, AND CALCIUM CHLORIDE 1000 ML: .6; .31; .03; .02 INJECTION, SOLUTION INTRAVENOUS at 01:01

## 2024-07-18 RX ADMIN — ALUMINUM HYDROXIDE, MAGNESIUM HYDROXIDE, DIMETHICONE 30 ML: 400; 400; 40 SUSPENSION ORAL at 02:20

## 2024-07-18 RX ADMIN — ONDANSETRON 4 MG: 2 INJECTION INTRAMUSCULAR; INTRAVENOUS at 00:35

## 2024-07-18 NOTE — ED NOTES
Attempted to call [facility] twice at this time, voicemail left. Will call again at a later time.      [facility]  07/18/24 0518       [facility]  07/18/24 0520

## 2024-07-18 NOTE — DISCHARGE INSTRUCTIONS
Follow-up with PCP.  Take medicine as directed.  Rest and hydrate.  If any symptoms worsen or new symptoms appear return to the ER.

## 2024-07-18 NOTE — ED NOTES
Facility called at this time, spoke with Emily about pt discharge and stated that they will be picking pt up to [facility].      Crystal Monet  07/18/24 0607

## 2024-07-18 NOTE — ED PROVIDER NOTES
History  Chief Complaint   Patient presents with    Abdominal Pain     C/o abd pain and nausea after going to a carnival today     The patient is a 44 y.o. female with a history of adrenocortical insufficiency, bipolar disorder, CVA, thyroid disease, epilepsy, hyperlipidemia, hypopituitarism, psychatric disorder and mild intellectual disabilities who presents to [facility] with a chief complaint of diarrhea. Symptoms began this evening after returning from a carnival today and have been constant since onset. Her pain is currently rated as a 6/10 in severity and described as crampy without radiation. Associated symptoms include nausea and diarrhea. She notes that there was some blood in the stool. Symptoms are aggravated with none noted and alleviating factors include none noted. The patient denies fever, chills, night sweats, chest pain, shortness breath, cough, sputum, hemoptysis, hematemesis, dysuria, hematuria, frequency, urgency, jaundice, falls, trauma, dizziness, lightheadedness, syncope. No other reported symptoms at this time.  Patient affirms allergies to haldol, thioridazine          History provided by:  Patient   used: No    Abdominal Pain  Associated symptoms: diarrhea and nausea    Associated symptoms: no chest pain, no chills, no cough, no dysuria, no fever, no hematuria, no shortness of breath, no sore throat and no vomiting        Prior to Admission Medications   Prescriptions Last Dose Informant Patient Reported? Taking?   ARIPiprazole (ABILIFY) 15 mg tablet   No No   Sig: Take 1 tablet (15 mg total) by mouth daily   Calcium 600-D 600-10 MG-MCG TABS   Yes No   Calcium Carb-Cholecalciferol (calcium carbonate-vitamin D) 500 mg-5 mcg tablet   No No   Sig: Take 1 tablet by mouth 2 (two) times a day with meals   Cholecalciferol (Vitamin D) 50 MCG (2000 UT) tablet   No No   Sig: Take 1 tablet (2,000 Units total) by mouth daily   D3 Super Strength 50 MCG (2000 UT) CAPS    Yes No   Multiple Vitamin (multivitamin) tablet   No No   Sig: Take 1 tablet by mouth daily   Omega-3 Fatty Acids (fish oil) 1,000 mg   No No   Sig: Take 1 capsule (1,000 mg total) by mouth 3 (three) times a day   acetaminophen (TYLENOL) 325 mg tablet  Care Giver, Self Yes No   Sig: Take 650 mg by mouth every 4 (four) hours as needed for mild pain   albuterol (PROVENTIL HFA,VENTOLIN HFA) 90 mcg/act inhaler  Care Giver, Self Yes No   Sig: Inhale 2 puffs every 6 (six) hours as needed for wheezing   clonazePAM (KlonoPIN) 0.5 mg tablet   No No   Sig: Take 1 tablet (0.5 mg total) by mouth 2 (two) times a day   cyanocobalamin (VITAMIN B-12) 1000 MCG tablet   No No   Sig: Take 1 tablet (1,000 mcg total) by mouth daily   divalproex sodium (DEPAKOTE ER) 500 mg 24 hr tablet   No No   Sig: Take 3 tablets (1,500 mg total) by mouth daily at bedtime   docusate sodium (COLACE) 100 mg capsule   No No   Sig: Take 1 capsule (100 mg total) by mouth daily   furosemide (LASIX) 40 mg tablet   No No   Sig: Take 1 tablet (40 mg total) by mouth daily   hydrocortisone (CORTEF) 10 mg tablet   Yes No   hydrocortisone (CORTEF) 5 mg tablet   No No   Sig: Take 3 tablets (15 mg total) by mouth daily   levothyroxine 100 mcg tablet   Yes No   levothyroxine 112 mcg tablet   No No   Sig: Take 3 tablets (336 mcg total) by mouth 2 (two) times a week On Saturday and Sunday   levothyroxine 200 mcg tablet   No No   Sig: Take 1 tablet (200 mcg total) by mouth daily Take 2 tablets by mouth Monday-Friday   lidocaine (Lidoderm) 5 %   No No   Sig: Apply 1 patch topically over 12 hours daily as needed (pain) Remove & Discard patch within 12 hours or as directed by MD   magnesium Oxide (MAG-OX) 400 mg TABS   No No   Sig: Take 1 tablet (400 mg total) by mouth 2 (two) times a day   magnesium oxide (MAG-OX) 400 mg tablet   Yes No   meclizine (ANTIVERT) 25 mg tablet   No No   Sig: Take 1 tablet (25 mg total) by mouth every 12 (twelve) hours as needed for dizziness    meloxicam (MOBIC) 15 mg tablet   No No   Sig: Take 1 tablet (15 mg total) by mouth daily as needed for moderate pain   nystatin (MYCOSTATIN) powder   No No   Sig: Apply topically 2 (two) times a day   sertraline (ZOLOFT) 100 mg tablet   No No   Sig: Take 1 tablet (100 mg total) by mouth daily   triamcinolone (KENALOG) 0.025 % cream   Yes No   triamcinolone (KENALOG) 0.1 % cream   No No   Sig: Apply topically 2 (two) times a day as needed (ezcema)      Facility-Administered Medications: None       Past Medical History:   Diagnosis Date    Adrenocortical insufficiency (HCC)     Bipolar disorder (HCC)     CVA (cerebral vascular accident) (HCC)     Disease of thyroid gland     hypo    Disorder of bone density and structure, unspecified     Epilepsy (HCC)     Hyperlipidemia     Hypopituitarism (HCC)     Mild intellectual disabilities     Psychiatric disorder     bipolar       Past Surgical History:   Procedure Laterality Date    EPIDURAL BLOCK INJECTION N/A 8/23/2023    Procedure: L3-L4  LUMBAR epidural steroid injection (99118);  Surgeon: Adam Hayden DO;  Location: [facility] MAIN OR;  Service: Pain Management        No family history on file.  I have reviewed and agree with the history as documented.    E-Cigarette/Vaping    E-Cigarette Use Never User      E-Cigarette/Vaping Substances    Nicotine No     THC No     CBD No     Flavoring No     Other No     Unknown No      Social History     Tobacco Use    Smoking status: Never    Smokeless tobacco: Never   Vaping Use    Vaping status: Never Used   Substance Use Topics    Alcohol use: Not Currently    Drug use: Not Currently       Review of Systems   Constitutional:  Negative for chills and fever.   HENT:  Negative for ear pain and sore throat.    Eyes:  Negative for pain and visual disturbance.   Respiratory:  Negative for cough and shortness of breath.    Cardiovascular:  Negative for chest pain and palpitations.   Gastrointestinal:  Positive for abdominal pain,  diarrhea and nausea. Negative for vomiting.   Genitourinary:  Negative for dysuria and hematuria.   Musculoskeletal:  Negative for arthralgias and back pain.   Skin:  Negative for color change and rash.   Neurological:  Negative for seizures and syncope.   All other systems reviewed and are negative.      Physical Exam  Physical Exam  Vitals reviewed.   Constitutional:       General: She is not in acute distress.     Appearance: She is well-developed. She is not ill-appearing.   HENT:      Head: Normocephalic.      Mouth/Throat:      Mouth: Mucous membranes are moist.   Eyes:      General: No scleral icterus.     Extraocular Movements: Extraocular movements intact.   Cardiovascular:      Rate and Rhythm: Normal rate.      Heart sounds: Normal heart sounds.   Pulmonary:      Effort: Pulmonary effort is normal. No respiratory distress.      Breath sounds: Normal breath sounds. No stridor. No wheezing, rhonchi or rales.   Abdominal:      General: Abdomen is flat. Bowel sounds are normal.      Palpations: Abdomen is soft.      Tenderness: There is generalized abdominal tenderness.   Skin:     General: Skin is warm and dry.      Capillary Refill: Capillary refill takes less than 2 seconds.      Coloration: Skin is not cyanotic or mottled.   Neurological:      General: No focal deficit present.      Mental Status: She is alert and oriented to person, place, and time.         Vital Signs  ED Triage Vitals   Temperature Pulse Respirations Blood Pressure SpO2   07/18/24 0015 07/18/24 0015 07/18/24 0015 07/18/24 0015 07/18/24 0015   97.8 °F (36.6 °C) 79 18 146/83 98 %      Temp Source Heart Rate Source Patient Position - Orthostatic VS BP Location FiO2 (%)   07/18/24 0015 07/18/24 0015 07/18/24 0232 07/18/24 0232 --   Oral Monitor Sitting Left arm       Pain Score       --                  Vitals:    07/18/24 0015 07/18/24 0232 07/18/24 0453   BP: 146/83 157/68 110/63   Pulse: 79 68 73   Patient Position - Orthostatic VS:   Sitting Sitting         Visual Acuity      ED Medications  Medications   lactated ringers bolus 1,000 mL (0 mL Intravenous Stopped 7/18/24 0339)   ondansetron (ZOFRAN) injection 4 mg (4 mg Intravenous Given 7/18/24 0035)   aluminum-magnesium hydroxide-simethicone (MAALOX) oral suspension 30 mL (30 mL Oral Given 7/18/24 0220)   dicyclomine (BENTYL) tablet 20 mg (20 mg Oral Given 7/18/24 0220)   iohexol (OMNIPAQUE) 350 MG/ML injection (MULTI-DOSE) 100 mL (100 mL Intravenous Given 7/18/24 0428)       Diagnostic Studies  Results Reviewed       Procedure Component Value Units Date/Time    Urine Microscopic [[id number]]  (Abnormal) Collected: 07/18/24 0219    Lab Status: Final result Specimen: Urine, Clean Catch Updated: 07/18/24 0305     RBC, UA 2-4 /hpf      WBC, UA 10-20 /hpf      Epithelial Cells Occasional /hpf      Bacteria, UA Occasional /hpf      MUCUS THREADS Occasional    Urine culture [[id number]] Collected: 07/18/24 0219    Lab Status: In process Specimen: Urine, Clean Catch Updated: 07/18/24 0304    UA w Reflex to Microscopic w Reflex to Culture [[id number]]  (Abnormal) Collected: 07/18/24 0219    Lab Status: Final result Specimen: Urine, Clean Catch Updated: 07/18/24 0303     Color, UA Yellow     Clarity, UA Clear     Specific Gravity, UA 1.034     pH, UA 7.0     Leukocytes, UA Large     Nitrite, UA Negative     Protein, UA 30 (1+) mg/dl      Glucose, UA Negative mg/dl      Ketones, UA Trace mg/dl      Urobilinogen, UA 2.0 mg/dl      Bilirubin, UA Negative     Occult Blood, UA Negative    HS Troponin 0hr (reflex protocol) [[id number]]  (Normal) Collected: 07/18/24 0100    Lab Status: Final result Specimen: Blood from Arm, Right Updated: 07/18/24 0129     hs TnI 0hr 4 ng/L     Protime-INR [[id number]]  (Normal) Collected: 07/18/24 0100    Lab Status: Final result Specimen: Blood from Arm, Right Updated: 07/18/24 0117     Protime 13.1 seconds      INR 0.94    APTT [[id number]]  (Normal) Collected: 07/18/24 0100     Lab Status: Final result Specimen: Blood from Arm, Right Updated: 07/18/24 0117     PTT 24 seconds     hCG, qualitative pregnancy [[id number]]  (Normal) Collected: 07/18/24 0032    Lab Status: Final result Specimen: Blood from Arm, Right Updated: 07/18/24 0116     Preg, Serum Negative    Comprehensive metabolic panel [[id number]]  (Abnormal) Collected: 07/18/24 0032    Lab Status: Final result Specimen: Blood from Arm, Right Updated: 07/18/24 0054     Sodium 140 mmol/L      Potassium 3.9 mmol/L      Chloride 103 mmol/L      CO2 30 mmol/L      ANION GAP 7 mmol/L      BUN 20 mg/dL      Creatinine 0.65 mg/dL      Glucose 97 mg/dL      Calcium 9.3 mg/dL      AST 18 U/L      ALT 21 U/L      Alkaline Phosphatase 69 U/L      Total Protein 6.3 g/dL      Albumin 3.8 g/dL      Total Bilirubin 0.34 mg/dL      eGFR 107 ml/min/1.73sq m     Narrative:      National Kidney Disease Foundation guidelines for Chronic Kidney Disease (CKD):     Stage 1 with normal or high GFR (GFR > 90 mL/min/1.73 square meters)    Stage 2 Mild CKD (GFR = 60-89 mL/min/1.73 square meters)    Stage 3A Moderate CKD (GFR = 45-59 mL/min/1.73 square meters)    Stage 3B Moderate CKD (GFR = 30-44 mL/min/1.73 square meters)    Stage 4 Severe CKD (GFR = 15-29 mL/min/1.73 square meters)    Stage 5 End Stage CKD (GFR <15 mL/min/1.73 square meters)  Note: GFR calculation is accurate only with a steady state creatinine    CBC and differential [[id number]]  (Abnormal) Collected: 07/18/24 0032    Lab Status: Final result Specimen: Blood from Arm, Right Updated: 07/18/24 0041     WBC 7.71 Thousand/uL      RBC 4.65 Million/uL      Hemoglobin 13.3 g/dL      Hematocrit 40.1 %      MCV 86 fL      MCH 28.6 pg      MCHC 33.2 g/dL      RDW 13.0 %      MPV 10.5 fL      Platelets 196 Thousands/uL      nRBC 0 /100 WBCs      Segmented % 45 %      Immature Grans % 1 %      Lymphocytes % 46 %      Monocytes % 8 %      Eosinophils Relative 0 %      Basophils Relative 0 %      Absolute  Neutrophils 3.44 Thousands/µL      Absolute Immature Grans 0.04 Thousand/uL      Absolute Lymphocytes 3.59 Thousands/µL      Absolute Monocytes 0.59 Thousand/µL      Eosinophils Absolute 0.03 Thousand/µL      Basophils Absolute 0.02 Thousands/µL                    CT abdomen pelvis with contrast   Final Result by Carmelo Pollock MD (07/18 0454)      No acute abnormality         Workstation performed: [id number]         XR chest 2 views   ED Interpretation by Tono Skelton PA-C (07/18 0309)   No acute cardiopulmonary disease      Final Result by Andrea Lopez MD (07/18 0633)      No acute cardiopulmonary disease.            Workstation performed: [id number]                    Procedures  Procedures         ED Course  ED Course as of 07/18/24 0653   Thu Jul 18, 2024   0131 hs TnI 0hr: 4   0313 Patient tolerating oral intake and feels improved     0507 CT abdomen pelvis with contrast  No acute abnormality                                 SBIRT 20yo+      Flowsheet Row Most Recent Value   Initial Alcohol Screen: US AUDIT-C     1. How often do you have a drink containing alcohol? 0 Filed at: 07/18/2024 0024   2. How many drinks containing alcohol do you have on a typical day you are drinking?  0 Filed at: 07/18/2024 0024   3b. FEMALE Any Age, or MALE 65+: How often do you have 4 or more drinks on one occassion? 0 Filed at: 07/18/2024 0024   Audit-C Score 0 Filed at: 07/18/2024 0024   FABY: How many times in the past year have you...    Used an illegal drug or used a prescription medication for non-medical reasons? Never Filed at: 07/18/2024 0024                      Medical Decision Making  This patient presents with nausea and diarrhea. Differential diagnosis includes possible acute gastroenteritis. Abdominal exam without peritoneal signs. Currently euvolemic without evidence of dehydration. Doubt invasive bacteria causing diarrhea such as C diff (no recent antibiotics). No recent travel. Patient is not  immunocompromised. No evidence of surgical abdomen or other acute medical emergency including bowel obstruction, viscus perforation, vascular catastrophe, atypical appendicitis, acute cholecystitis, UGIB, thyrotoxicosis, or diverticulitis at this time. Presentation not consistent with other acute, emergent causes of vomiting / diarrhea at this time. CT abdomen and pelvis showed No acute abnormality. Patient well-appearing and tolerating PO intake. Prior to discharge, discharge instructions were discussed with patient at bedside. Patient was provided both verbal and written instructions. Patient is understanding of the discharge instructions and is agreeable to plan of care. Return precautions were discussed with patient bedside, patient verbalized understanding of signs and symptoms that would necessitate return to the ED. All questions were answered. Patient was comfortable with the plan of care and discharged to home.       Problems Addressed:  Abdominal pain: acute illness or injury  Diarrhea: acute illness or injury    Amount and/or Complexity of Data Reviewed  Labs: ordered. Decision-making details documented in ED Course.  Radiology: ordered and independent interpretation performed. Decision-making details documented in ED Course.    Risk  OTC drugs.  Prescription drug management.                 Disposition  Final diagnoses:   Abdominal pain   Diarrhea     Time reflects when diagnosis was documented in both MDM as applicable and the Disposition within this note       Time User Action Codes Description Comment    7/18/2024  3:14 AM Tono Skelton Add [R10.9] Abdominal pain     7/18/2024  3:14 AM Tono Skelton Add [R19.7] Diarrhea           ED Disposition       ED Disposition   Discharge    Condition   Stable    Date/Time   Thu Jul 18, 2024 0315    Comment   Yaritza Dunne discharge to home/self care.                   Follow-up Information       Follow up With Specialties Details Why Contact Info Additional  Information    Luis Felipe Hankins MD Family Medicine Schedule an appointment as soon as possible for a visit   [address] [facility] [address] [phone]       [facility] Emergency Medicine  If symptoms worsen [address] [facility] [id number] [phone] [facility] Emergency Department, [address] [place], [place], [address]            Patient's Medications   Discharge Prescriptions    DICYCLOMINE (BENTYL) 20 MG TABLET    Take 1 tablet (20 mg total) by mouth 2 (two) times a day       Start Date: 7/18/2024 End Date: --       Order Dose: 20 mg       Quantity: 20 tablet    Refills: 0    ONDANSETRON (ZOFRAN-ODT) 4 MG DISINTEGRATING TABLET    Take 1 tablet (4 mg total) by mouth every 6 (six) hours as needed for nausea or vomiting       Start Date: 7/18/2024 End Date: --       Order Dose: 4 mg       Quantity: 12 tablet    Refills: 0       No discharge procedures on file.    PDMP Review         Value Time User    PDMP Reviewed  Yes 3/29/2024 12:23 PM LENO Saunders            ED Provider  Electronically Signed by             Tono Skelton PA-C  07/18/24 0653

## 2024-07-19 LAB — BACTERIA UR CULT: NORMAL

## 2024-08-17 ENCOUNTER — OFFICE VISIT (OUTPATIENT)
Dept: URGENT CARE | Facility: CLINIC | Age: 44
End: 2024-08-17
Payer: MEDICARE

## 2024-08-17 VITALS
TEMPERATURE: 99.9 F | HEART RATE: 108 BPM | DIASTOLIC BLOOD PRESSURE: 74 MMHG | SYSTOLIC BLOOD PRESSURE: 121 MMHG | OXYGEN SATURATION: 95 % | RESPIRATION RATE: 20 BRPM

## 2024-08-17 DIAGNOSIS — U07.1 COVID-19: Primary | ICD-10-CM

## 2024-08-17 DIAGNOSIS — R05.1 ACUTE COUGH: ICD-10-CM

## 2024-08-17 LAB
SARS-COV-2 AG UPPER RESP QL IA: POSITIVE
VALID CONTROL: ABNORMAL

## 2024-08-17 PROCEDURE — 87811 SARS-COV-2 COVID19 W/OPTIC: CPT | Performed by: PHYSICIAN ASSISTANT

## 2024-08-17 PROCEDURE — G0463 HOSPITAL OUTPT CLINIC VISIT: HCPCS | Performed by: PHYSICIAN ASSISTANT

## 2024-08-17 PROCEDURE — 99213 OFFICE O/P EST LOW 20 MIN: CPT | Performed by: PHYSICIAN ASSISTANT

## 2024-08-17 NOTE — LETTER
[facility]  [address]  [facility] [address]  Dept: [phone]    August 17, 2024    Patient: Yaritza Dunne  YOB: 1980    Yaritza Dunne was seen and evaluated at our [facility]. Patient is COVID positive. She may return to activities once it has been 5 days from the onset of symptoms and fever free for 24 hours.   Mask for an additional 5 days after that.       Sincerely,    Gayathri Garrido PA-C

## 2024-08-17 NOTE — PATIENT INSTRUCTIONS
Discussed positive COVID test.   Recommended tylenol and ibuprofen as needed for fevers. Recommended over the counter medication as needed for symptomatic management.         Follow up with PCP in 3-5 days.  Proceed to  ER if symptoms worsen.    If tests are performed, our office will contact you with results only if changes need to made to the care plan discussed with you at the visit. You can review your full results on [facility] My[facility].

## 2024-08-17 NOTE — PROGRESS NOTES
[facility]        NAME: Yaritza Dunne is a 44 y.o. female  : 1980    MRN: [id number]  DATE: 2024  TIME: 12:12 PM    Assessment and Plan   COVID-19 [U07.1]  1. COVID-19        2. Acute cough  Poct Covid 19 Rapid Antigen Test            Patient Instructions     Patient Instructions   Discussed positive COVID test.   Recommended tylenol and ibuprofen as needed for fevers. Recommended over the counter medication as needed for symptomatic management.         Follow up with PCP in 3-5 days.  Proceed to  ER if symptoms worsen.    If tests are performed, our office will contact you with results only if changes need to made to the care plan discussed with you at the visit. You can review your full results on [facility].        Chief Complaint     Chief Complaint   Patient presents with    Cold Like Symptoms     Patient here with chills, fevers, cough, vomiting and sore throat that started last night. There are a few recent cases of covid in the group home as of recently.          History of Present Illness       URI   This is a new problem. The current episode started yesterday. The problem has been unchanged. The maximum temperature recorded prior to her arrival was 100.4 - 100.9 F. Associated symptoms include congestion, coughing, a sore throat and vomiting.       Review of Systems   Review of Systems   Constitutional:  Positive for activity change, appetite change, chills, fatigue and fever.   HENT:  Positive for congestion and sore throat.    Respiratory:  Positive for cough.    Gastrointestinal:  Positive for vomiting.   All other systems reviewed and are negative.        Current Medications       Current Outpatient Medications:     acetaminophen (TYLENOL) 325 mg tablet, Take 650 mg by mouth every 4 (four) hours as needed for mild pain, Disp: , Rfl:     albuterol (PROVENTIL HFA,VENTOLIN HFA) 90 mcg/act inhaler, Inhale 2 puffs every 6 (six) hours as needed for wheezing, Disp: , Rfl:      ARIPiprazole (ABILIFY) 15 mg tablet, Take 1 tablet (15 mg total) by mouth daily, Disp: 30 tablet, Rfl: 0    Calcium Carb-Cholecalciferol (calcium carbonate-vitamin D) 500 mg-5 mcg tablet, Take 1 tablet by mouth 2 (two) times a day with meals, Disp: 60 tablet, Rfl: 0    Cholecalciferol (Vitamin D) 50 MCG (2000 UT) tablet, Take 1 tablet (2,000 Units total) by mouth daily, Disp: 30 tablet, Rfl: 0    clonazePAM (KlonoPIN) 0.5 mg tablet, Take 1 tablet (0.5 mg total) by mouth 2 (two) times a day, Disp: 20 tablet, Rfl: 2    D3 Super Strength 50 MCG (2000 UT) CAPS, , Disp: , Rfl:     divalproex sodium (DEPAKOTE ER) 500 mg 24 hr tablet, Take 3 tablets (1,500 mg total) by mouth daily at bedtime, Disp: 90 tablet, Rfl: 0    docusate sodium (COLACE) 100 mg capsule, Take 1 capsule (100 mg total) by mouth daily, Disp: 30 capsule, Rfl: 0    furosemide (LASIX) 40 mg tablet, Take 1 tablet (40 mg total) by mouth daily, Disp: 30 tablet, Rfl: 0    hydrocortisone (CORTEF) 10 mg tablet, , Disp: , Rfl:     hydrocortisone (CORTEF) 5 mg tablet, Take 3 tablets (15 mg total) by mouth daily, Disp: 90 tablet, Rfl: 0    levothyroxine 112 mcg tablet, Take 3 tablets (336 mcg total) by mouth 2 (two) times a week On Saturday and Sunday, Disp: 30 tablet, Rfl: 0    levothyroxine 200 mcg tablet, Take 1 tablet (200 mcg total) by mouth daily Take 2 tablets by mouth Monday-Friday, Disp: 30 tablet, Rfl: 0    lidocaine (Lidoderm) 5 %, Apply 1 patch topically over 12 hours daily as needed (pain) Remove & Discard patch within 12 hours or as directed by MD, Disp: 30 patch, Rfl: 0    magnesium Oxide (MAG-OX) 400 mg TABS, Take 1 tablet (400 mg total) by mouth 2 (two) times a day, Disp: 60 tablet, Rfl: 0    meclizine (ANTIVERT) 25 mg tablet, Take 1 tablet (25 mg total) by mouth every 12 (twelve) hours as needed for dizziness, Disp: 30 tablet, Rfl: 0    meloxicam (MOBIC) 15 mg tablet, Take 1 tablet (15 mg total) by mouth daily as needed for moderate pain, Disp: 30  tablet, Rfl: 0    Multiple Vitamin (multivitamin) tablet, Take 1 tablet by mouth daily, Disp: 30 tablet, Rfl: 0    nystatin (MYCOSTATIN) powder, Apply topically 2 (two) times a day, Disp: 30 g, Rfl: 0    Omega-3 Fatty Acids (fish oil) 1,000 mg, Take 1 capsule (1,000 mg total) by mouth 3 (three) times a day, Disp: 90 capsule, Rfl: 0    ondansetron (ZOFRAN-ODT) 4 mg disintegrating tablet, Take 1 tablet (4 mg total) by mouth every 6 (six) hours as needed for nausea or vomiting, Disp: 12 tablet, Rfl: 0    sertraline (ZOLOFT) 100 mg tablet, Take 1 tablet (100 mg total) by mouth daily, Disp: 30 tablet, Rfl: 0    triamcinolone (KENALOG) 0.025 % cream, , Disp: , Rfl:     Calcium 600-D 600-10 MG-MCG TABS, , Disp: , Rfl:     cyanocobalamin (VITAMIN B-12) 1000 MCG tablet, Take 1 tablet (1,000 mcg total) by mouth daily, Disp: 30 tablet, Rfl: 0    dicyclomine (BENTYL) 20 mg tablet, Take 1 tablet (20 mg total) by mouth 2 (two) times a day, Disp: 20 tablet, Rfl: 0    levothyroxine 100 mcg tablet, , Disp: , Rfl:     magnesium oxide (MAG-OX) 400 mg tablet, , Disp: , Rfl:     triamcinolone (KENALOG) 0.1 % cream, Apply topically 2 (two) times a day as needed (ezcema), Disp: 15 g, Rfl: 0    Current Allergies     Allergies as of 08/17/2024 - Reviewed 08/17/2024   Allergen Reaction Noted    Haldol [haloperidol] Other (See Comments) 01/29/2020    Thioridazine  01/29/2020            The following portions of the patient's history were reviewed and updated as appropriate: allergies, current medications, past family history, past medical history, past social history, past surgical history and problem list.     Past Medical History:   Diagnosis Date    Adrenocortical insufficiency (HCC)     Bipolar disorder (HCC)     CVA (cerebral vascular accident) (HCC)     Disease of thyroid gland     hypo    Disorder of bone density and structure, unspecified     Epilepsy (HCC)     Hyperlipidemia     Hypopituitarism (HCC)     Mild intellectual  disabilities     Psychiatric disorder     bipolar       Past Surgical History:   Procedure Laterality Date    EPIDURAL BLOCK INJECTION N/A 8/23/2023    Procedure: L3-L4  LUMBAR epidural steroid injection (29312);  Surgeon: Adam Hayden DO;  Location: [facility] MAIN OR;  Service: Pain Management        History reviewed. No pertinent family history.      Medications have been verified.        Objective   /74   Pulse (!) 108   Temp 99.9 °F (37.7 °C)   Resp 20   SpO2 95%        Physical Exam     Physical Exam  Vitals and nursing note reviewed.   Constitutional:       Appearance: Normal appearance.   HENT:      Right Ear: Tympanic membrane, ear canal and external ear normal.      Left Ear: Tympanic membrane, ear canal and external ear normal.      Nose: Nose normal.      Mouth/Throat:      Mouth: Mucous membranes are moist.   Eyes:      Pupils: Pupils are equal, round, and reactive to light.   Cardiovascular:      Rate and Rhythm: Normal rate and regular rhythm.   Pulmonary:      Effort: Pulmonary effort is normal.      Breath sounds: Normal breath sounds.   Skin:     General: Skin is warm and dry.   Neurological:      General: No focal deficit present.      Mental Status: She is alert and oriented to person, place, and time.   Psychiatric:         Mood and Affect: Mood normal.         Behavior: Behavior normal.

## 2024-11-19 ENCOUNTER — APPOINTMENT (EMERGENCY)
Dept: RADIOLOGY | Facility: HOSPITAL | Age: 44
End: 2024-11-19
Payer: MEDICARE

## 2024-11-19 ENCOUNTER — HOSPITAL ENCOUNTER (EMERGENCY)
Facility: HOSPITAL | Age: 44
Discharge: HOME | End: 2024-11-20
Payer: MEDICARE

## 2024-11-19 VITALS
HEART RATE: 74 BPM | RESPIRATION RATE: 18 BRPM | HEIGHT: 68 IN | BODY MASS INDEX: 42.17 KG/M2 | OXYGEN SATURATION: 100 % | TEMPERATURE: 98.4 F | WEIGHT: 278.22 LBS | DIASTOLIC BLOOD PRESSURE: 84 MMHG | SYSTOLIC BLOOD PRESSURE: 174 MMHG

## 2024-11-19 DIAGNOSIS — R11.0 NAUSEA: ICD-10-CM

## 2024-11-19 DIAGNOSIS — R10.9 ABDOMINAL PAIN: Primary | ICD-10-CM

## 2024-11-19 DIAGNOSIS — R19.7 DIARRHEA: ICD-10-CM

## 2024-11-19 LAB
ALBUMIN SERPL BCG-MCNC: 4.1 G/DL (ref 3.5–5)
ALP SERPL-CCNC: 64 U/L (ref 34–104)
ALT SERPL W P-5'-P-CCNC: 21 U/L (ref 7–52)
ANION GAP SERPL CALCULATED.3IONS-SCNC: 7 MMOL/L (ref 4–13)
AST SERPL W P-5'-P-CCNC: 18 U/L (ref 13–39)
BASOPHILS # BLD AUTO: 0.03 THOUSANDS/ÂΜL (ref 0–0.1)
BASOPHILS NFR BLD AUTO: 0 % (ref 0–1)
BILIRUB SERPL-MCNC: 0.32 MG/DL (ref 0.2–1)
BNP SERPL-MCNC: 27 PG/ML (ref 0–100)
BUN SERPL-MCNC: 12 MG/DL (ref 5–25)
CALCIUM SERPL-MCNC: 9.3 MG/DL (ref 8.4–10.2)
CARDIAC TROPONIN I PNL SERPL HS: 5 NG/L (ref ?–50)
CHLORIDE SERPL-SCNC: 100 MMOL/L (ref 96–108)
CO2 SERPL-SCNC: 31 MMOL/L (ref 21–32)
CREAT SERPL-MCNC: 0.55 MG/DL (ref 0.6–1.3)
EOSINOPHIL # BLD AUTO: 0.03 THOUSAND/ÂΜL (ref 0–0.61)
EOSINOPHIL NFR BLD AUTO: 0 % (ref 0–6)
ERYTHROCYTE [DISTWIDTH] IN BLOOD BY AUTOMATED COUNT: 12.5 % (ref 11.6–15.1)
GFR SERPL CREATININE-BSD FRML MDRD: 113 ML/MIN/1.73SQ M
GLUCOSE SERPL-MCNC: 91 MG/DL (ref 65–140)
HCG SERPL QL: NEGATIVE
HCT VFR BLD AUTO: 40.1 % (ref 34.8–46.1)
HGB BLD-MCNC: 13.4 G/DL (ref 11.5–15.4)
IMM GRANULOCYTES # BLD AUTO: 0.03 THOUSAND/UL (ref 0–0.2)
IMM GRANULOCYTES NFR BLD AUTO: 0 % (ref 0–2)
LACTATE SERPL-SCNC: 1.7 MMOL/L (ref 0.5–2)
LIPASE SERPL-CCNC: 25 U/L (ref 11–82)
LYMPHOCYTES # BLD AUTO: 3.2 THOUSANDS/ÂΜL (ref 0.6–4.47)
LYMPHOCYTES NFR BLD AUTO: 41 % (ref 14–44)
MCH RBC QN AUTO: 29.1 PG (ref 26.8–34.3)
MCHC RBC AUTO-ENTMCNC: 33.4 G/DL (ref 31.4–37.4)
MCV RBC AUTO: 87 FL (ref 82–98)
MONOCYTES # BLD AUTO: 0.49 THOUSAND/ÂΜL (ref 0.17–1.22)
MONOCYTES NFR BLD AUTO: 6 % (ref 4–12)
NEUTROPHILS # BLD AUTO: 3.99 THOUSANDS/ÂΜL (ref 1.85–7.62)
NEUTS SEG NFR BLD AUTO: 53 % (ref 43–75)
NRBC BLD AUTO-RTO: 0 /100 WBCS
PLATELET # BLD AUTO: 215 THOUSANDS/UL (ref 149–390)
PMV BLD AUTO: 10.1 FL (ref 8.9–12.7)
POTASSIUM SERPL-SCNC: 4.1 MMOL/L (ref 3.5–5.3)
PROT SERPL-MCNC: 6.5 G/DL (ref 6.4–8.4)
RBC # BLD AUTO: 4.6 MILLION/UL (ref 3.81–5.12)
SODIUM SERPL-SCNC: 138 MMOL/L (ref 135–147)
WBC # BLD AUTO: 7.77 THOUSAND/UL (ref 4.31–10.16)

## 2024-11-19 PROCEDURE — 83880 ASSAY OF NATRIURETIC PEPTIDE: CPT

## 2024-11-19 PROCEDURE — 71045 X-RAY EXAM CHEST 1 VIEW: CPT

## 2024-11-19 PROCEDURE — 84484 ASSAY OF TROPONIN QUANT: CPT

## 2024-11-19 PROCEDURE — 96375 TX/PRO/DX INJ NEW DRUG ADDON: CPT

## 2024-11-19 PROCEDURE — 96374 THER/PROPH/DIAG INJ IV PUSH: CPT

## 2024-11-19 PROCEDURE — 80053 COMPREHEN METABOLIC PANEL: CPT

## 2024-11-19 PROCEDURE — 84703 CHORIONIC GONADOTROPIN ASSAY: CPT

## 2024-11-19 PROCEDURE — 36415 COLL VENOUS BLD VENIPUNCTURE: CPT

## 2024-11-19 PROCEDURE — 83605 ASSAY OF LACTIC ACID: CPT

## 2024-11-19 PROCEDURE — 85025 COMPLETE CBC W/AUTO DIFF WBC: CPT

## 2024-11-19 PROCEDURE — 99284 EMERGENCY DEPT VISIT MOD MDM: CPT

## 2024-11-19 PROCEDURE — 93005 ELECTROCARDIOGRAM TRACING: CPT

## 2024-11-19 PROCEDURE — 83690 ASSAY OF LIPASE: CPT

## 2024-11-19 RX ORDER — ACETAMINOPHEN 10 MG/ML
1000 INJECTION, SOLUTION INTRAVENOUS ONCE
Status: COMPLETED | OUTPATIENT
Start: 2024-11-19 | End: 2024-11-19

## 2024-11-19 RX ORDER — KETOROLAC TROMETHAMINE 30 MG/ML
15 INJECTION, SOLUTION INTRAMUSCULAR; INTRAVENOUS ONCE
Status: COMPLETED | OUTPATIENT
Start: 2024-11-19 | End: 2024-11-19

## 2024-11-19 RX ORDER — FENTANYL CITRATE 50 UG/ML
50 INJECTION, SOLUTION INTRAMUSCULAR; INTRAVENOUS ONCE
Refills: 0 | Status: COMPLETED | OUTPATIENT
Start: 2024-11-19 | End: 2024-11-19

## 2024-11-19 RX ORDER — ONDANSETRON 2 MG/ML
4 INJECTION INTRAMUSCULAR; INTRAVENOUS ONCE
Status: COMPLETED | OUTPATIENT
Start: 2024-11-19 | End: 2024-11-19

## 2024-11-19 RX ADMIN — KETOROLAC TROMETHAMINE 15 MG: 30 INJECTION, SOLUTION INTRAMUSCULAR at 21:34

## 2024-11-19 RX ADMIN — ONDANSETRON 4 MG: 2 INJECTION INTRAMUSCULAR; INTRAVENOUS at 21:34

## 2024-11-19 RX ADMIN — FENTANYL CITRATE 50 MCG: 50 INJECTION INTRAMUSCULAR; INTRAVENOUS at 23:04

## 2024-11-19 RX ADMIN — ACETAMINOPHEN 1000 MG: 10 INJECTION INTRAVENOUS at 21:34

## 2024-11-20 ENCOUNTER — APPOINTMENT (EMERGENCY)
Dept: CT IMAGING | Facility: HOSPITAL | Age: 44
End: 2024-11-20
Payer: MEDICARE

## 2024-11-20 LAB
2HR DELTA HS TROPONIN: 0 NG/L
ATRIAL RATE: 62 BPM
CARDIAC TROPONIN I PNL SERPL HS: 5 NG/L (ref ?–50)
P AXIS: 39 DEGREES
PR INTERVAL: 172 MS
QRS AXIS: 39 DEGREES
QRSD INTERVAL: 104 MS
QT INTERVAL: 414 MS
QTC INTERVAL: 420 MS
T WAVE AXIS: 49 DEGREES
VENTRICULAR RATE: 62 BPM

## 2024-11-20 PROCEDURE — 93010 ELECTROCARDIOGRAM REPORT: CPT | Performed by: STUDENT IN AN ORGANIZED HEALTH CARE EDUCATION/TRAINING PROGRAM

## 2024-11-20 PROCEDURE — 99285 EMERGENCY DEPT VISIT HI MDM: CPT

## 2024-11-20 PROCEDURE — 74177 CT ABD & PELVIS W/CONTRAST: CPT

## 2024-11-20 RX ORDER — ONDANSETRON 4 MG/1
4 TABLET, ORALLY DISINTEGRATING ORAL EVERY 8 HOURS PRN
Qty: 9 TABLET | Refills: 0 | Status: SHIPPED | OUTPATIENT
Start: 2024-11-20 | End: 2024-11-23

## 2024-11-20 RX ADMIN — IOHEXOL 100 ML: 350 INJECTION, SOLUTION INTRAVENOUS at 00:23

## 2024-11-20 NOTE — DISCHARGE INSTRUCTIONS
IMPRESSION:     No acute findings in the abdomen or pelvis.    Zofran for nausea.   Tylenol and Motrin for pain.  Follow-up with your PCP and return to the ER for any worsening symptoms.

## 2024-11-20 NOTE — ED NOTES
This writer called Brit[facility] to arrange p/u. [facility] nursing house mgr sending someone to picked  up patient. Primary RN made aware           Magnolia Baum RN  11/20/24 8893

## 2024-11-20 NOTE — ED NOTES
Patient was asked to give a urine sample for a pregnancy test due to the administration of TORADOL IV. Patient refused and states that she is not pregnant.      Reta Day  11/19/24 3111

## 2024-11-20 NOTE — ED NOTES
Care giver, Suleiman, called for .  Box lunch provided.     Apurva Sagastume RN  11/20/24 0245       Apurva Sagastume RN  11/20/24 0244

## 2024-11-20 NOTE — ED PROVIDER NOTES
Time reflects when diagnosis was documented in both MDM as applicable and the Disposition within this note       Time User Action Codes Description Comment    11/20/2024  1:30 AM [email or web address] Add [R10.9] Abdominal pain     11/20/2024  1:30 AM Brittany Sarmiento Add [R19.7] Diarrhea     11/20/2024  1:30 AM Brittany Sarmiento Add [R11.0] Nausea           ED Disposition       ED Disposition   Discharge    Condition   Stable    Date/Time   Wed Nov 20, 2024  1:30 AM    Comment   Yaritza Dunne discharge to home/self care.                   Assessment & Plan       Medical Decision Making  Vital signs stable and patient well-appearing throughout ER course.  Labs stable.  Negative cardiac workup.  No acute CT findings.  Improvement of symptoms after medications in the ER.  Patient able to tolerate p.o. intake without difficulty.  Patient to follow-up with her PCP and return to the ER for any worsening symptoms.  Provided patient education and discussed return precautions.  Patient verbalizes understanding and agrees to discharge plan.  Patient also provided with written discharge instructions.    Amount and/or Complexity of Data Reviewed  Labs: ordered.  Radiology: ordered.    Risk  Prescription drug management.        ED Course as of 11/20/24 0220 Wed Nov 20, 2024   0127 IMPRESSION:     No acute findings in the abdomen or pelvis.            Medications   acetaminophen (Ofirmev) injection 1,000 mg (0 mg Intravenous Stopped 11/19/24 2149)   ketorolac (TORADOL) injection 15 mg (15 mg Intravenous Given 11/19/24 2134)   ondansetron (ZOFRAN) injection 4 mg (4 mg Intravenous Given 11/19/24 2134)   fentaNYL injection 50 mcg (50 mcg Intravenous Given 11/19/24 2304)   iohexol (OMNIPAQUE) 350 MG/ML injection (MULTI-DOSE) 100 mL (100 mL Intravenous Given 11/20/24 0023)       ED Risk Strat Scores   HEART Risk Score      Flowsheet Row Most Recent Value   Heart Score Risk Calculator    History 0 Filed at: 11/20/2024 0128   ECG 0 Filed at:  11/20/2024 0128   Age 1 Filed at: 11/20/2024 0128   Risk Factors 2 Filed at: 11/20/2024 0128   Troponin 0 Filed at: 11/20/2024 0128   HEART Score 3 Filed at: 11/20/2024 0128                               SBIRT 20yo+      Flowsheet Row Most Recent Value   Initial Alcohol Screen: US AUDIT-C     1. How often do you have a drink containing alcohol? 0 Filed at: 11/19/2024 2117   2. How many drinks containing alcohol do you have on a typical day you are drinking?  0 Filed at: 11/19/2024 2117   3b. FEMALE Any Age, or MALE 65+: How often do you have 4 or more drinks on one occassion? 0 Filed at: 11/19/2024 2117   Audit-C Score 0 Filed at: 11/19/2024 2117   FABY: How many times in the past year have you...    Used an illegal drug or used a prescription medication for non-medical reasons? Never Filed at: 11/19/2024 2117                            History of Present Illness       Chief Complaint   Patient presents with    Abdominal Pain     Patient arrived to ER c/o abd pain started this afternoon around 12pm. Generalized abd pain. +N/D Denies vomiting. Patient unable to eat and drink. 10/10 abd pain        Past Medical History:   Diagnosis Date    Adrenocortical insufficiency (HCC)     Bipolar disorder (HCC)     CVA (cerebral vascular accident) (HCC)     Disease of thyroid gland     hypo    Disorder of bone density and structure, unspecified     Epilepsy (HCC)     Hyperlipidemia     Hypopituitarism (HCC)     Mild intellectual disabilities     Psychiatric disorder     bipolar      Past Surgical History:   Procedure Laterality Date    EPIDURAL BLOCK INJECTION N/A 8/23/2023    Procedure: L3-L4  LUMBAR epidural steroid injection ([id number]);  Surgeon: Adam Hayden DO;  Location: [facility] MAIN OR;  Service: Pain Management       History reviewed. No pertinent family history.   Social History     Tobacco Use    Smoking status: Never    Smokeless tobacco: Never   Vaping Use    Vaping status: Never Used   Substance Use Topics     Alcohol use: Not Currently    Drug use: Not Currently      E-Cigarette/Vaping    E-Cigarette Use Never User       E-Cigarette/Vaping Substances    Nicotine No     THC No     CBD No     Flavoring No     Other No     Unknown No       I have reviewed and agree with the history as documented.     Patient is a 44-year-old female who presents to the emergency room for abdominal pain, nausea and nonbloody watery diarrhea.  Patient reports symptoms have been ongoing since this afternoon.  Denies any other symptoms.      Abdominal Pain  Associated symptoms: diarrhea and nausea    Associated symptoms: no chest pain, no chills, no constipation, no cough, no dysuria, no fever, no hematuria, no shortness of breath, no sore throat and no vomiting        Review of Systems   Constitutional:  Negative for chills and fever.   HENT:  Negative for ear pain, sore throat, tinnitus and voice change.    Eyes:  Negative for pain and visual disturbance.   Respiratory:  Negative for cough and shortness of breath.    Cardiovascular:  Negative for chest pain and palpitations.   Gastrointestinal:  Positive for abdominal pain, diarrhea and nausea. Negative for blood in stool, constipation and vomiting.   Genitourinary:  Negative for dysuria, flank pain and hematuria.   Musculoskeletal:  Negative for arthralgias and back pain.   Skin:  Negative for color change and rash.   Neurological:  Negative for seizures and syncope.   Psychiatric/Behavioral:  Negative for confusion.    All other systems reviewed and are negative.          Objective       ED Triage Vitals   Temperature Pulse Blood Pressure Respirations SpO2 Patient Position - Orthostatic VS   11/19/24 2114 11/19/24 2114 11/19/24 2114 11/19/24 2114 11/19/24 2114 11/19/24 2114   98.4 °F (36.9 °C) 74 (!) 174/84 18 100 % Lying      Temp Source Heart Rate Source BP Location FiO2 (%) Pain Score    11/19/24 2114 11/19/24 2114 11/19/24 2114 -- 11/19/24 2118    Oral Monitor Left arm  6      Vitals       Date and Time Temp Pulse SpO2 Resp BP Pain Score FACES Pain Rating User   11/19/24 2304 -- -- -- -- -- 7 -- KT   11/19/24 2204 -- -- -- -- -- 5 -- CC   11/19/24 2118 -- -- -- -- -- 6 -- CC   11/19/24 2117 -- -- 100 % -- -- -- -- CC   11/19/24 2114 98.4 °F (36.9 °C) 74 100 % 18 174/84 -- -- CC            Physical Exam  Vitals and nursing note reviewed.   Constitutional:       General: She is not in acute distress.     Appearance: She is well-developed. She is obese.   HENT:      Head: Normocephalic and atraumatic.   Eyes:      Conjunctiva/sclera: Conjunctivae normal.   Cardiovascular:      Rate and Rhythm: Normal rate and regular rhythm.      Pulses: Normal pulses.      Heart sounds: No murmur heard.  Pulmonary:      Effort: Pulmonary effort is normal. No respiratory distress.      Breath sounds: Normal breath sounds.   Abdominal:      Palpations: Abdomen is soft.      Tenderness: There is generalized abdominal tenderness.   Musculoskeletal:         General: No swelling.      Cervical back: Neck supple.      Right lower leg: Edema present.      Left lower leg: Edema present.   Skin:     General: Skin is warm and dry.      Capillary Refill: Capillary refill takes less than 2 seconds.   Neurological:      General: No focal deficit present.      Mental Status: She is alert and oriented to person, place, and time. Mental status is at baseline.   Psychiatric:         Mood and Affect: Mood normal.         Results Reviewed       Procedure Component Value Units Date/Time    HS Troponin I 2hr [[id number]]  (Normal) Collected: 11/19/24 2344    Lab Status: Final result Specimen: Blood from Arm, Left Updated: 11/20/24 0012     hs TnI 2hr 5 ng/L      Delta 2hr hsTnI 0 ng/L     B-Type Natriuretic Peptide(BNP) [[id number]]  (Normal) Collected: 11/19/24 2123    Lab Status: Final result Specimen: Blood from Arm, Right Updated: 11/19/24 2219     BNP 27 pg/mL     HS Troponin 0hr (reflex protocol) [[id number]]  (Normal) Collected:  11/19/24 2137    Lab Status: Final result Specimen: Blood from Arm, Right Updated: 11/19/24 2205     hs TnI 0hr 5 ng/L     hCG, qualitative pregnancy [[id number]]  (Normal) Collected: 11/19/24 2123    Lab Status: Final result Specimen: Blood from Arm, Right Updated: 11/19/24 2202     Preg, Serum Negative    Lactic acid, plasma (w/reflex if result > 2.0) [[id number]]  (Normal) Collected: 11/19/24 2123    Lab Status: Final result Specimen: Blood from Arm, Right Updated: 11/19/24 2201     LACTIC ACID 1.7 mmol/L     Narrative:      Result may be elevated if tourniquet was used during collection.    Comprehensive metabolic panel [[id number]]  (Abnormal) Collected: 11/19/24 2123    Lab Status: Final result Specimen: Blood from Arm, Right Updated: 11/19/24 2158     Sodium 138 mmol/L      Potassium 4.1 mmol/L      Chloride 100 mmol/L      CO2 31 mmol/L      ANION GAP 7 mmol/L      BUN 12 mg/dL      Creatinine 0.55 mg/dL      Glucose 91 mg/dL      Calcium 9.3 mg/dL      AST 18 U/L      ALT 21 U/L      Alkaline Phosphatase 64 U/L      Total Protein 6.5 g/dL      Albumin 4.1 g/dL      Total Bilirubin 0.32 mg/dL      eGFR 113 ml/min/1.73sq m     Narrative:      National Kidney Disease Foundation guidelines for Chronic Kidney Disease (CKD):     Stage 1 with normal or high GFR (GFR > 90 mL/min/1.73 square meters)    Stage 2 Mild CKD (GFR = 60-89 mL/min/1.73 square meters)    Stage 3A Moderate CKD (GFR = 45-59 mL/min/1.73 square meters)    Stage 3B Moderate CKD (GFR = 30-44 mL/min/1.73 square meters)    Stage 4 Severe CKD (GFR = 15-29 mL/min/1.73 square meters)    Stage 5 End Stage CKD (GFR <15 mL/min/1.73 square meters)  Note: GFR calculation is accurate only with a steady state creatinine    Lipase [[id number]]  (Normal) Collected: 11/19/24 2123    Lab Status: Final result Specimen: Blood from Arm, Right Updated: 11/19/24 2158     Lipase 25 u/L     CBC and differential [[id number]] Collected: 11/19/24 2121    Lab Status: Final result  Specimen: Blood from Arm, Right Updated: 11/19/24 2132     WBC 7.77 Thousand/uL      RBC 4.60 Million/uL      Hemoglobin 13.4 g/dL      Hematocrit 40.1 %      MCV 87 fL      MCH 29.1 pg      MCHC 33.4 g/dL      RDW 12.5 %      MPV 10.1 fL      Platelets 215 Thousands/uL      nRBC 0 /100 WBCs      Segmented % 53 %      Immature Grans % 0 %      Lymphocytes % 41 %      Monocytes % 6 %      Eosinophils Relative 0 %      Basophils Relative 0 %      Absolute Neutrophils 3.99 Thousands/µL      Absolute Immature Grans 0.03 Thousand/uL      Absolute Lymphocytes 3.20 Thousands/µL      Absolute Monocytes 0.49 Thousand/µL      Eosinophils Absolute 0.03 Thousand/µL      Basophils Absolute 0.03 Thousands/µL             CT abdomen pelvis with contrast   Final Interpretation by Andrea Lopez MD (11/20 0048)      No acute findings in the abdomen or pelvis.               Workstation performed: [id number]         XR chest 1 view portable    (Results Pending)       ECG 12 Lead Documentation Only    Date/Time: 11/19/2024 9:20 PM    Performed by: Brittany Sarmiento PA-C  Authorized by: Brittany Sarmiento PA-C    Indications / Diagnosis:  Cardiac work-up  ECG reviewed by me, the ED Provider: yes    Patient location:  ED  Interpretation:     Interpretation: normal    Rate:     ECG rate:  62    ECG rate assessment: normal    Rhythm:     Rhythm: sinus rhythm    ST segments:     ST segments:  Normal  T waves:     T waves: normal        ED Medication and Procedure Management   Prior to Admission Medications   Prescriptions Last Dose Informant Patient Reported? Taking?   ARIPiprazole (ABILIFY) 15 mg tablet   No No   Sig: Take 1 tablet (15 mg total) by mouth daily   Calcium 600-D 600-10 MG-MCG TABS   Yes No   Calcium Carb-Cholecalciferol (calcium carbonate-vitamin D) 500 mg-5 mcg tablet   No No   Sig: Take 1 tablet by mouth 2 (two) times a day with meals   Cholecalciferol (Vitamin D) 50 MCG (2000 UT) tablet   No No   Sig: Take 1 tablet (2,000  Units total) by mouth daily   D3 Super Strength 50 MCG (2000 UT) CAPS   Yes No   Multiple Vitamin (multivitamin) tablet   No No   Sig: Take 1 tablet by mouth daily   Omega-3 Fatty Acids (fish oil) 1,000 mg   No No   Sig: Take 1 capsule (1,000 mg total) by mouth 3 (three) times a day   acetaminophen (TYLENOL) 325 mg tablet  Care Giver, Self Yes No   Sig: Take 650 mg by mouth every 4 (four) hours as needed for mild pain   albuterol (PROVENTIL HFA,VENTOLIN HFA) 90 mcg/act inhaler  Care Giver, Self Yes No   Sig: Inhale 2 puffs every 6 (six) hours as needed for wheezing   clonazePAM (KlonoPIN) 0.5 mg tablet   No No   Sig: Take 1 tablet (0.5 mg total) by mouth 2 (two) times a day   cyanocobalamin (VITAMIN B-12) 1000 MCG tablet   No No   Sig: Take 1 tablet (1,000 mcg total) by mouth daily   dicyclomine (BENTYL) 20 mg tablet   No No   Sig: Take 1 tablet (20 mg total) by mouth 2 (two) times a day   divalproex sodium (DEPAKOTE ER) 500 mg 24 hr tablet   No No   Sig: Take 3 tablets (1,500 mg total) by mouth daily at bedtime   docusate sodium (COLACE) 100 mg capsule   No No   Sig: Take 1 capsule (100 mg total) by mouth daily   furosemide (LASIX) 40 mg tablet   No No   Sig: Take 1 tablet (40 mg total) by mouth daily   hydrocortisone (CORTEF) 10 mg tablet   Yes No   hydrocortisone (CORTEF) 5 mg tablet   No No   Sig: Take 3 tablets (15 mg total) by mouth daily   levothyroxine 100 mcg tablet   Yes No   Patient not taking: Reported on 8/17/2024   levothyroxine 112 mcg tablet   No No   Sig: Take 3 tablets (336 mcg total) by mouth 2 (two) times a week On Saturday and Sunday   levothyroxine 200 mcg tablet   No No   Sig: Take 1 tablet (200 mcg total) by mouth daily Take 2 tablets by mouth Monday-Friday   lidocaine (Lidoderm) 5 %   No No   Sig: Apply 1 patch topically over 12 hours daily as needed (pain) Remove & Discard patch within 12 hours or as directed by MD   magnesium Oxide (MAG-OX) 400 mg TABS   No No   Sig: Take 1 tablet (400 mg  total) by mouth 2 (two) times a day   magnesium oxide (MAG-OX) 400 mg tablet   Yes No   Patient not taking: Reported on 8/17/2024   meclizine (ANTIVERT) 25 mg tablet   No No   Sig: Take 1 tablet (25 mg total) by mouth every 12 (twelve) hours as needed for dizziness   meloxicam (MOBIC) 15 mg tablet   No No   Sig: Take 1 tablet (15 mg total) by mouth daily as needed for moderate pain   nystatin (MYCOSTATIN) powder   No No   Sig: Apply topically 2 (two) times a day   ondansetron (ZOFRAN-ODT) 4 mg disintegrating tablet   No No   Sig: Take 1 tablet (4 mg total) by mouth every 6 (six) hours as needed for nausea or vomiting   sertraline (ZOLOFT) 100 mg tablet   No No   Sig: Take 1 tablet (100 mg total) by mouth daily   triamcinolone (KENALOG) 0.025 % cream   Yes No   triamcinolone (KENALOG) 0.1 % cream   No No   Sig: Apply topically 2 (two) times a day as needed (ezcema)      Facility-Administered Medications: None     Patient's Medications   Discharge Prescriptions    ONDANSETRON (ZOFRAN-ODT) 4 MG DISINTEGRATING TABLET    Take 1 tablet (4 mg total) by mouth every 8 (eight) hours as needed for nausea or vomiting for up to 3 days       Start Date: 11/20/2024End Date: 11/23/2024       Order Dose: 4 mg       Quantity: 9 tablet    Refills: 0     No discharge procedures on file.  ED SEPSIS DOCUMENTATION   Time reflects when diagnosis was documented in both MDM as applicable and the Disposition within this note       Time User Action Codes Description Comment    11/20/2024  1:30 AM Brittany Sarmiento [R10.9] Abdominal pain     11/20/2024  1:30 AM Brittany Sarmiento [R19.7] Diarrhea     11/20/2024  1:30 AM Brittany Sarmiento [R11.0] Nausea                  Brittany Sarmiento PA-C  11/20/24 0221

## 2025-01-13 ENCOUNTER — HOSPITAL ENCOUNTER (EMERGENCY)
Facility: HOSPITAL | Age: 45
Discharge: HOME | End: 2025-01-13
Attending: EMERGENCY MEDICINE
Payer: MEDICARE

## 2025-01-13 ENCOUNTER — APPOINTMENT (EMERGENCY)
Dept: CT IMAGING | Facility: HOSPITAL | Age: 45
End: 2025-01-13
Payer: MEDICARE

## 2025-01-13 VITALS
SYSTOLIC BLOOD PRESSURE: 136 MMHG | HEART RATE: 78 BPM | DIASTOLIC BLOOD PRESSURE: 62 MMHG | TEMPERATURE: 97.8 F | RESPIRATION RATE: 20 BRPM | OXYGEN SATURATION: 94 %

## 2025-01-13 DIAGNOSIS — T07.XXXA MULTIPLE ABRASIONS: ICD-10-CM

## 2025-01-13 DIAGNOSIS — K52.9 ACUTE GASTROENTERITIS: ICD-10-CM

## 2025-01-13 DIAGNOSIS — Z72.89 DELIBERATE SELF-CUTTING: Primary | ICD-10-CM

## 2025-01-13 LAB
ALBUMIN SERPL BCG-MCNC: 4.1 G/DL (ref 3.5–5)
ALP SERPL-CCNC: 60 U/L (ref 34–104)
ALT SERPL W P-5'-P-CCNC: 19 U/L (ref 7–52)
ANION GAP SERPL CALCULATED.3IONS-SCNC: 7 MMOL/L (ref 4–13)
AST SERPL W P-5'-P-CCNC: 18 U/L (ref 13–39)
BACTERIA UR QL AUTO: ABNORMAL /HPF
BASOPHILS # BLD AUTO: 0.02 THOUSANDS/ΜL (ref 0–0.1)
BASOPHILS NFR BLD AUTO: 0 % (ref 0–1)
BILIRUB SERPL-MCNC: 0.3 MG/DL (ref 0.2–1)
BILIRUB UR QL STRIP: NEGATIVE
BUN SERPL-MCNC: 21 MG/DL (ref 5–25)
CALCIUM SERPL-MCNC: 9.4 MG/DL (ref 8.4–10.2)
CHLORIDE SERPL-SCNC: 102 MMOL/L (ref 96–108)
CLARITY UR: CLEAR
CO2 SERPL-SCNC: 31 MMOL/L (ref 21–32)
COLOR UR: YELLOW
CREAT SERPL-MCNC: 0.6 MG/DL (ref 0.6–1.3)
EOSINOPHIL # BLD AUTO: 0.03 THOUSAND/ΜL (ref 0–0.61)
EOSINOPHIL NFR BLD AUTO: 1 % (ref 0–6)
ERYTHROCYTE [DISTWIDTH] IN BLOOD BY AUTOMATED COUNT: 12.5 % (ref 11.6–15.1)
EXT PREGNANCY TEST URINE: NEGATIVE
EXT. CONTROL: NORMAL
GFR SERPL CREATININE-BSD FRML MDRD: 110 ML/MIN/1.73SQ M
GLUCOSE SERPL-MCNC: 87 MG/DL (ref 65–140)
GLUCOSE UR STRIP-MCNC: NEGATIVE MG/DL
HCT VFR BLD AUTO: 39.1 % (ref 34.8–46.1)
HGB BLD-MCNC: 12.8 G/DL (ref 11.5–15.4)
HGB UR QL STRIP.AUTO: NEGATIVE
HYALINE CASTS #/AREA URNS LPF: ABNORMAL /LPF
IMM GRANULOCYTES # BLD AUTO: 0.02 THOUSAND/UL (ref 0–0.2)
IMM GRANULOCYTES NFR BLD AUTO: 0 % (ref 0–2)
KETONES UR STRIP-MCNC: ABNORMAL MG/DL
LEUKOCYTE ESTERASE UR QL STRIP: ABNORMAL
LIPASE SERPL-CCNC: 33 U/L (ref 11–82)
LYMPHOCYTES # BLD AUTO: 3.18 THOUSANDS/ΜL (ref 0.6–4.47)
LYMPHOCYTES NFR BLD AUTO: 48 % (ref 14–44)
MCH RBC QN AUTO: 29 PG (ref 26.8–34.3)
MCHC RBC AUTO-ENTMCNC: 32.7 G/DL (ref 31.4–37.4)
MCV RBC AUTO: 89 FL (ref 82–98)
MONOCYTES # BLD AUTO: 0.41 THOUSAND/ΜL (ref 0.17–1.22)
MONOCYTES NFR BLD AUTO: 6 % (ref 4–12)
MUCOUS THREADS UR QL AUTO: ABNORMAL
NEUTROPHILS # BLD AUTO: 3 THOUSANDS/ΜL (ref 1.85–7.62)
NEUTS SEG NFR BLD AUTO: 45 % (ref 43–75)
NITRITE UR QL STRIP: NEGATIVE
NON-SQ EPI CELLS URNS QL MICRO: ABNORMAL /HPF
NRBC BLD AUTO-RTO: 0 /100 WBCS
PH UR STRIP.AUTO: 5.5 [PH]
PLATELET # BLD AUTO: 205 THOUSANDS/UL (ref 149–390)
PMV BLD AUTO: 10.2 FL (ref 8.9–12.7)
POTASSIUM SERPL-SCNC: 4.2 MMOL/L (ref 3.5–5.3)
PROT SERPL-MCNC: 6.5 G/DL (ref 6.4–8.4)
PROT UR STRIP-MCNC: ABNORMAL MG/DL
RBC # BLD AUTO: 4.42 MILLION/UL (ref 3.81–5.12)
RBC #/AREA URNS AUTO: ABNORMAL /HPF
SODIUM SERPL-SCNC: 140 MMOL/L (ref 135–147)
SP GR UR STRIP.AUTO: 1.03 (ref 1–1.03)
UROBILINOGEN UR STRIP-ACNC: <2 MG/DL
WBC # BLD AUTO: 6.66 THOUSAND/UL (ref 4.31–10.16)
WBC #/AREA URNS AUTO: ABNORMAL /HPF

## 2025-01-13 PROCEDURE — 81025 URINE PREGNANCY TEST: CPT | Performed by: EMERGENCY MEDICINE

## 2025-01-13 PROCEDURE — 83690 ASSAY OF LIPASE: CPT | Performed by: EMERGENCY MEDICINE

## 2025-01-13 PROCEDURE — 81001 URINALYSIS AUTO W/SCOPE: CPT | Performed by: EMERGENCY MEDICINE

## 2025-01-13 PROCEDURE — 87086 URINE CULTURE/COLONY COUNT: CPT | Performed by: EMERGENCY MEDICINE

## 2025-01-13 PROCEDURE — 96375 TX/PRO/DX INJ NEW DRUG ADDON: CPT

## 2025-01-13 PROCEDURE — 85025 COMPLETE CBC W/AUTO DIFF WBC: CPT | Performed by: EMERGENCY MEDICINE

## 2025-01-13 PROCEDURE — 80053 COMPREHEN METABOLIC PANEL: CPT | Performed by: EMERGENCY MEDICINE

## 2025-01-13 PROCEDURE — 96361 HYDRATE IV INFUSION ADD-ON: CPT

## 2025-01-13 PROCEDURE — 96374 THER/PROPH/DIAG INJ IV PUSH: CPT

## 2025-01-13 PROCEDURE — 93005 ELECTROCARDIOGRAM TRACING: CPT

## 2025-01-13 PROCEDURE — 74177 CT ABD & PELVIS W/CONTRAST: CPT

## 2025-01-13 PROCEDURE — 36415 COLL VENOUS BLD VENIPUNCTURE: CPT | Performed by: EMERGENCY MEDICINE

## 2025-01-13 PROCEDURE — 99285 EMERGENCY DEPT VISIT HI MDM: CPT

## 2025-01-13 PROCEDURE — 99285 EMERGENCY DEPT VISIT HI MDM: CPT | Performed by: EMERGENCY MEDICINE

## 2025-01-13 RX ORDER — ONDANSETRON 4 MG/1
4 TABLET, ORALLY DISINTEGRATING ORAL EVERY 8 HOURS PRN
Qty: 20 TABLET | Refills: 0 | Status: SHIPPED | OUTPATIENT
Start: 2025-01-13

## 2025-01-13 RX ORDER — ONDANSETRON 2 MG/ML
4 INJECTION INTRAMUSCULAR; INTRAVENOUS ONCE
Status: COMPLETED | OUTPATIENT
Start: 2025-01-13 | End: 2025-01-13

## 2025-01-13 RX ORDER — KETOROLAC TROMETHAMINE 30 MG/ML
30 INJECTION, SOLUTION INTRAMUSCULAR; INTRAVENOUS ONCE
Status: COMPLETED | OUTPATIENT
Start: 2025-01-13 | End: 2025-01-13

## 2025-01-13 RX ORDER — GINSENG 100 MG
1 CAPSULE ORAL 3 TIMES DAILY
Qty: 28 G | Refills: 0 | Status: SHIPPED | OUTPATIENT
Start: 2025-01-13

## 2025-01-13 RX ORDER — GINSENG 100 MG
2 CAPSULE ORAL ONCE
Status: DISCONTINUED | OUTPATIENT
Start: 2025-01-13 | End: 2025-01-13 | Stop reason: HOSPADM

## 2025-01-13 RX ADMIN — KETOROLAC TROMETHAMINE 30 MG: 30 INJECTION, SOLUTION INTRAMUSCULAR; INTRAVENOUS at 18:27

## 2025-01-13 RX ADMIN — SODIUM CHLORIDE 1000 ML: 0.9 INJECTION, SOLUTION INTRAVENOUS at 18:23

## 2025-01-13 RX ADMIN — IOHEXOL 100 ML: 350 INJECTION, SOLUTION INTRAVENOUS at 19:36

## 2025-01-13 RX ADMIN — ONDANSETRON 4 MG: 2 INJECTION INTRAMUSCULAR; INTRAVENOUS at 18:27
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
## 2025-01-14 LAB
ATRIAL RATE: 80 BPM
BACTERIA UR CULT: NORMAL
P AXIS: 25 DEGREES
PR INTERVAL: 168 MS
QRS AXIS: 1 DEGREES
QRSD INTERVAL: 98 MS
QT INTERVAL: 404 MS
QTC INTERVAL: 465 MS
T WAVE AXIS: -7 DEGREES
VENTRICULAR RATE: 80 BPM

## 2025-01-14 PROCEDURE — 93010 ELECTROCARDIOGRAM REPORT: CPT | Performed by: INTERNAL MEDICINE

## 2025-01-21 ENCOUNTER — OFFICE VISIT (OUTPATIENT)
Dept: URGENT CARE | Facility: CLINIC | Age: 45
End: 2025-01-21
Payer: MEDICARE

## 2025-01-21 VITALS
BODY MASS INDEX: 42.27 KG/M2 | HEART RATE: 93 BPM | RESPIRATION RATE: 18 BRPM | SYSTOLIC BLOOD PRESSURE: 122 MMHG | DIASTOLIC BLOOD PRESSURE: 74 MMHG | WEIGHT: 278 LBS | TEMPERATURE: 97.4 F | OXYGEN SATURATION: 97 %

## 2025-01-21 DIAGNOSIS — H66.90 ACUTE OTITIS MEDIA, UNSPECIFIED OTITIS MEDIA TYPE: ICD-10-CM

## 2025-01-21 DIAGNOSIS — H92.02 LEFT EAR PAIN: Primary | ICD-10-CM

## 2025-01-21 PROCEDURE — 99213 OFFICE O/P EST LOW 20 MIN: CPT | Performed by: EMERGENCY MEDICINE

## 2025-01-21 PROCEDURE — G0463 HOSPITAL OUTPT CLINIC VISIT: HCPCS | Performed by: EMERGENCY MEDICINE

## 2025-01-21 RX ORDER — IBUPROFEN 800 MG/1
800 TABLET, FILM COATED ORAL 2 TIMES DAILY
Qty: 14 TABLET | Refills: 0 | Status: SHIPPED | OUTPATIENT
Start: 2025-01-21 | End: 2025-01-28

## 2025-01-21 RX ORDER — IBUPROFEN 400 MG/1
800 TABLET, FILM COATED ORAL ONCE
Status: COMPLETED | OUTPATIENT
Start: 2025-01-21 | End: 2025-01-21

## 2025-01-21 RX ORDER — AMOXICILLIN 500 MG/1
500 CAPSULE ORAL EVERY 8 HOURS SCHEDULED
Qty: 21 CAPSULE | Refills: 0 | Status: SHIPPED | OUTPATIENT
Start: 2025-01-21 | End: 2025-01-28

## 2025-01-21 RX ADMIN — IBUPROFEN 800 MG: 400 TABLET, FILM COATED ORAL at 14:08

## 2025-01-21 NOTE — PATIENT INSTRUCTIONS
Take meds until gone  Motrin with food for pain  F/u with PCP or ENT if no improvement  Stay well hydrated  Proceed to the ER if symptoms get worse

## 2025-01-21 NOTE — PROGRESS NOTES
[facility]        NAME: Yaritza Dunne is a 44 y.o. female  : 1980    MRN: [id number]  DATE: 2025  TIME: 2:13 PM    Assessment and Plan   Left ear pain [H92.02]  1. Left ear pain  ibuprofen (MOTRIN) tablet 800 mg    ibuprofen (MOTRIN) 800 mg tablet      2. Acute otitis media, unspecified otitis media type  amoxicillin (AMOXIL) 500 mg capsule    ibuprofen (MOTRIN) 800 mg tablet    bilateral            Patient Instructions     Patient Instructions    Take meds until gone  Motrin with food for pain  F/u with PCP or ENT if no improvement  Stay well hydrated  Proceed to the ER if symptoms get worse      Follow up with PCP in 3-5 days.  Proceed to  ER if symptoms worsen.    Chief Complaint     Chief Complaint   Patient presents with   • Earache     Pt c/o bilateral ear pain more left than right started about 10 days ago          History of Present Illness       44-year-old female with a chief complaint of bilateral ear pain.  Patient states the left hurts more than the right.  Patient states it has been going on for couple days.        Review of Systems   Review of Systems   Constitutional:  Negative for chills and fever.   HENT:  Positive for ear pain. Negative for congestion and rhinorrhea.    Eyes:  Negative for discharge and visual disturbance.   Respiratory:  Negative for shortness of breath and wheezing.    Cardiovascular:  Negative for chest pain and palpitations.   Gastrointestinal:  Negative for abdominal pain and vomiting.   Endocrine: Negative for polydipsia and polyuria.   Genitourinary:  Negative for dysuria and hematuria.   Musculoskeletal:  Negative for arthralgias, gait problem and neck stiffness.   Skin:  Negative for rash and wound.   Neurological:  Negative for dizziness and headaches.   Psychiatric/Behavioral:  Negative for confusion and suicidal ideas.          Current Medications       Current Outpatient Medications:   •  acetaminophen (TYLENOL) 325 mg tablet, Take 650 mg  by mouth every 4 (four) hours as needed for mild pain, Disp: , Rfl:   •  albuterol (PROVENTIL HFA,VENTOLIN HFA) 90 mcg/act inhaler, Inhale 2 puffs every 6 (six) hours as needed for wheezing, Disp: , Rfl:   •  amoxicillin (AMOXIL) 500 mg capsule, Take 1 capsule (500 mg total) by mouth every 8 (eight) hours for 7 days, Disp: 21 capsule, Rfl: 0  •  bacitracin topical ointment 500 units/g topical ointment, Apply 1 large application topically 3 (three) times a day to abrasions topically x 1 week, Disp: 28 g, Rfl: 0  •  Calcium 600-D 600-10 MG-MCG TABS, , Disp: , Rfl:   •  Cholecalciferol (Vitamin D) 50 MCG (2000 UT) tablet, Take 1 tablet (2,000 Units total) by mouth daily, Disp: 30 tablet, Rfl: 0  •  clonazePAM (KlonoPIN) 0.5 mg tablet, Take 1 tablet (0.5 mg total) by mouth 2 (two) times a day, Disp: 20 tablet, Rfl: 2  •  D3 Super Strength 50 MCG (2000 UT) CAPS, , Disp: , Rfl:   •  dicyclomine (BENTYL) 20 mg tablet, Take 1 tablet (20 mg total) by mouth 2 (two) times a day, Disp: 20 tablet, Rfl: 0  •  docusate sodium (COLACE) 100 mg capsule, Take 1 capsule (100 mg total) by mouth daily, Disp: 30 capsule, Rfl: 0  •  furosemide (LASIX) 40 mg tablet, Take 1 tablet (40 mg total) by mouth daily, Disp: 30 tablet, Rfl: 0  •  hydrocortisone (CORTEF) 10 mg tablet, , Disp: , Rfl:   •  hydrocortisone (CORTEF) 5 mg tablet, Take 3 tablets (15 mg total) by mouth daily, Disp: 90 tablet, Rfl: 0  •  ibuprofen (MOTRIN) 800 mg tablet, Take 1 tablet (800 mg total) by mouth 2 (two) times a day for 7 days, Disp: 14 tablet, Rfl: 0  •  levothyroxine 112 mcg tablet, Take 3 tablets (336 mcg total) by mouth 2 (two) times a week On Saturday and Sunday, Disp: 30 tablet, Rfl: 0  •  levothyroxine 200 mcg tablet, Take 1 tablet (200 mcg total) by mouth daily Take 2 tablets by mouth Monday-Friday, Disp: 30 tablet, Rfl: 0  •  meclizine (ANTIVERT) 25 mg tablet, Take 1 tablet (25 mg total) by mouth every 12 (twelve) hours as needed for dizziness, Disp: 30  tablet, Rfl: 0  •  meloxicam (MOBIC) 15 mg tablet, Take 1 tablet (15 mg total) by mouth daily as needed for moderate pain, Disp: 30 tablet, Rfl: 0  •  Multiple Vitamin (multivitamin) tablet, Take 1 tablet by mouth daily, Disp: 30 tablet, Rfl: 0  •  nystatin (MYCOSTATIN) powder, Apply topically 2 (two) times a day, Disp: 30 g, Rfl: 0  •  Omega-3 Fatty Acids (fish oil) 1,000 mg, Take 1 capsule (1,000 mg total) by mouth 3 (three) times a day, Disp: 90 capsule, Rfl: 0  •  ARIPiprazole (ABILIFY) 15 mg tablet, Take 1 tablet (15 mg total) by mouth daily, Disp: 30 tablet, Rfl: 0  •  Calcium Carb-Cholecalciferol (calcium carbonate-vitamin D) 500 mg-5 mcg tablet, Take 1 tablet by mouth 2 (two) times a day with meals, Disp: 60 tablet, Rfl: 0  •  cyanocobalamin (VITAMIN B-12) 1000 MCG tablet, Take 1 tablet (1,000 mcg total) by mouth daily, Disp: 30 tablet, Rfl: 0  •  divalproex sodium (DEPAKOTE ER) 500 mg 24 hr tablet, Take 3 tablets (1,500 mg total) by mouth daily at bedtime, Disp: 90 tablet, Rfl: 0  •  levothyroxine 100 mcg tablet, , Disp: , Rfl:   •  lidocaine (Lidoderm) 5 %, Apply 1 patch topically over 12 hours daily as needed (pain) Remove & Discard patch within 12 hours or as directed by MD (Patient not taking: Reported on 1/21/2025), Disp: 30 patch, Rfl: 0  •  magnesium oxide (MAG-OX) 400 mg tablet, , Disp: , Rfl:   •  magnesium Oxide (MAG-OX) 400 mg TABS, Take 1 tablet (400 mg total) by mouth 2 (two) times a day, Disp: 60 tablet, Rfl: 0  •  ondansetron (ZOFRAN-ODT) 4 mg disintegrating tablet, Take 1 tablet (4 mg total) by mouth every 6 (six) hours as needed for nausea or vomiting (Patient not taking: Reported on 1/21/2025), Disp: 12 tablet, Rfl: 0  •  ondansetron (ZOFRAN-ODT) 4 mg disintegrating tablet, Take 1 tablet (4 mg total) by mouth every 8 (eight) hours as needed for nausea or vomiting for up to 3 days, Disp: 9 tablet, Rfl: 0  •  ondansetron (ZOFRAN-ODT) 4 mg disintegrating tablet, Take 1 tablet (4 mg total) by  mouth every 8 (eight) hours as needed for nausea or vomiting (Patient not taking: Reported on 1/21/2025), Disp: 20 tablet, Rfl: 0  •  sertraline (ZOLOFT) 100 mg tablet, Take 1 tablet (100 mg total) by mouth daily, Disp: 30 tablet, Rfl: 0  •  triamcinolone (KENALOG) 0.025 % cream, , Disp: , Rfl:   •  triamcinolone (KENALOG) 0.1 % cream, Apply topically 2 (two) times a day as needed (ezcema) (Patient not taking: Reported on 1/21/2025), Disp: 15 g, Rfl: 0  No current facility-administered medications for this visit.    Current Allergies     Allergies as of 01/21/2025 - Reviewed 01/21/2025   Allergen Reaction Noted   • Haldol [haloperidol] Other (See Comments) 01/29/2020   • Thioridazine  01/29/2020            The following portions of the patient's history were reviewed and updated as appropriate: allergies, current medications, past family history, past medical history, past social history, past surgical history and problem list.     Past Medical History:   Diagnosis Date   • Adrenocortical insufficiency (HCC)    • Bipolar disorder (HCC)    • CVA (cerebral vascular accident) (HCC)    • Disease of thyroid gland     hypo   • Disorder of bone density and structure, unspecified    • Epilepsy (HCC)    • Hyperlipidemia    • Hypopituitarism (HCC)    • Mild intellectual disabilities    • Psychiatric disorder     bipolar       Past Surgical History:   Procedure Laterality Date   • EPIDURAL BLOCK INJECTION N/A 8/23/2023    Procedure: L3-L4  LUMBAR epidural steroid injection (92202);  Surgeon: Adam Hayden DO;  Location: [facility] MAIN OR;  Service: Pain Management        No family history on file.      Medications have been verified.        Objective   /74   Pulse 93   Temp (!) 97.4 °F (36.3 °C) (Tympanic)   Resp 18   Wt 126 kg (278 lb)   SpO2 97%   BMI 42.27 kg/m²        Physical Exam     Physical Exam  Vitals and nursing note reviewed.   Constitutional:       Appearance: Normal appearance.      Comments:  44-year-old female sitting on the exam table complaining of left ear pain   HENT:      Head: Normocephalic and atraumatic.      Ears:      Comments: Right ear: There is erythema of the right ear canal but a good cone of light    Left ear: There is fluid and there is a decreased cone of light     Mouth/Throat:      Mouth: Mucous membranes are dry.      Pharynx: No posterior oropharyngeal erythema.      Comments: Dry lips  Eyes:      Extraocular Movements: Extraocular movements intact.      Pupils: Pupils are equal, round, and reactive to light.   Cardiovascular:      Rate and Rhythm: Normal rate and regular rhythm.   Pulmonary:      Effort: Pulmonary effort is normal.   Musculoskeletal:      Cervical back: Normal range of motion and neck supple.   Skin:     General: Skin is warm and dry.   Neurological:      Mental Status: She is alert.   Psychiatric:         Mood and Affect: Mood normal.

## 2025-04-12 ENCOUNTER — APPOINTMENT (EMERGENCY)
Dept: RADIOLOGY | Facility: HOSPITAL | Age: 45
End: 2025-04-12
Payer: MEDICARE

## 2025-04-12 ENCOUNTER — HOSPITAL ENCOUNTER (EMERGENCY)
Facility: HOSPITAL | Age: 45
End: 2025-04-12
Attending: EMERGENCY MEDICINE
Payer: MEDICARE

## 2025-04-12 VITALS
SYSTOLIC BLOOD PRESSURE: 140 MMHG | RESPIRATION RATE: 20 BRPM | TEMPERATURE: 97.4 F | OXYGEN SATURATION: 99 % | HEIGHT: 68 IN | HEART RATE: 72 BPM | BODY MASS INDEX: 43.64 KG/M2 | WEIGHT: 287.92 LBS | DIASTOLIC BLOOD PRESSURE: 76 MMHG

## 2025-04-12 DIAGNOSIS — F63.81 INTERMITTENT EXPLOSIVE DISORDER: ICD-10-CM

## 2025-04-12 DIAGNOSIS — R46.89 AGGRESSIVE BEHAVIOR: ICD-10-CM

## 2025-04-12 DIAGNOSIS — R45.89 SUICIDAL BEHAVIOR: Primary | ICD-10-CM

## 2025-04-12 DIAGNOSIS — F79 INTELLECTUAL DISABILITY: ICD-10-CM

## 2025-04-12 LAB
AMPHETAMINES SERPL QL SCN: NEGATIVE
BARBITURATES UR QL: NEGATIVE
BENZODIAZ UR QL: NEGATIVE
COCAINE UR QL: NEGATIVE
ETHANOL EXG-MCNC: 0 MG/DL
EXT PREGNANCY TEST URINE: NEGATIVE
EXT. CONTROL: NORMAL
FENTANYL UR QL SCN: NEGATIVE
HYDROCODONE UR QL SCN: NEGATIVE
METHADONE UR QL: NEGATIVE
OPIATES UR QL SCN: NEGATIVE
OXYCODONE+OXYMORPHONE UR QL SCN: NEGATIVE
PCP UR QL: NEGATIVE
THC UR QL: NEGATIVE

## 2025-04-12 PROCEDURE — 82075 ASSAY OF BREATH ETHANOL: CPT | Performed by: EMERGENCY MEDICINE

## 2025-04-12 PROCEDURE — 99285 EMERGENCY DEPT VISIT HI MDM: CPT

## 2025-04-12 PROCEDURE — 99285 EMERGENCY DEPT VISIT HI MDM: CPT | Performed by: EMERGENCY MEDICINE

## 2025-04-12 PROCEDURE — 73630 X-RAY EXAM OF FOOT: CPT

## 2025-04-12 PROCEDURE — 81025 URINE PREGNANCY TEST: CPT | Performed by: EMERGENCY MEDICINE

## 2025-04-12 PROCEDURE — 80307 DRUG TEST PRSMV CHEM ANLYZR: CPT | Performed by: EMERGENCY MEDICINE
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
## 2025-04-12 NOTE — LETTER
[facility] EMERGENCY DEPARTMENT  [address]  [place] [address]  Dept: [phone]      EMTALA TRANSFER CONSENT    NAME Yaritza CHAVEZ 1980                              MRN [id number]    I have been informed of my rights regarding examination, treatment, and transfer   by Dr. Ildefonso Dorsey*    Benefits: Specialized equipment and/or services available at the receiving facility (Include comment)________________________    Risks: Potential for delay in receiving treatment      Consent for Transfer:  I acknowledge that my medical condition has been evaluated and explained to me by the emergency department physician or other qualified medical person and/or my attending physician, who has recommended that I be transferred to the service of  Accepting Physician: Dr. Greco at Accepting Facility Name, City & State : [facility]. The above potential benefits of such transfer, the potential risks associated with such transfer, and the probable risks of not being transferred have been explained to me, and I fully understand them.  The doctor has explained that, in my case, the benefits of transfer outweigh the risks.  I agree to be transferred.    I authorize the performance of emergency medical procedures and treatments upon me in both transit and upon arrival at the receiving facility.  Additionally, I authorize the release of any and all medical records to the receiving facility and request they be transported with me, if possible.  I understand that the safest mode of transportation during a medical emergency is an ambulance and that the Hospital advocates the use of this mode of transport. Risks of traveling to the receiving facility by car, including absence of medical control, life sustaining equipment, such as oxygen, and medical personnel has been explained to me and I fully understand them.    (MJ CORRECT  BOX BELOW)  [ x ]  I consent to the stated transfer and to be transported by ambulance/helicopter.  [  ]  I consent to the stated transfer, but refuse transportation by ambulance and accept full responsibility for my transportation by car.  I understand the risks of non-ambulance transfers and I exonerate the Hospital and its staff from any deterioration in my condition that results from this refusal.    X___________________________________________    DATE  25  TIME________  Signature of patient or legally responsible individual signing on patient behalf           RELATIONSHIP TO PATIENT_________________________                    Provider Certification    NAME Yaritza Dunne                                         1980                              MRN [id number]    A medical screening exam was performed on the above named patient.  Based on the examination:    Condition Necessitating Transfer The primary encounter diagnosis was Suicidal behavior. Diagnoses of Aggressive behavior, Intellectual disability, and Intermittent explosive disorder were also pertinent to this visit.    Patient Condition: The patient has been stabilized such that within reasonable medical probability, no material deterioration of the patient condition or the condition of the unborn child(elfego) is likely to result from the transfer    Reason for Transfer: Level of Care needed not available at this facility    Transfer Requirements: Facility [facility]   Space available and qualified personnel available for treatment as acknowledged by Segun Valentine [phone]  Agreed to accept transfer and to provide appropriate medical treatment as acknowledged by       Dr. Greco  Appropriate medical records of the examination and treatment of the patient are provided at the time of transfer   STAFF INITIAL WHEN COMPLETED _MC______  Transfer will be performed by qualified personnel from [facility]  and appropriate transfer equipment as required, including the use of necessary and appropriate life support measures.    Provider Certification: I have examined the patient and explained the following risks and benefits of being transferred/refusing transfer to the patient/family:  Consent was not obtained as patient is committed to psychiatric facility and transfer is mandated      Based on these reasonable risks and benefits to the patient and/or the unborn child(elfego), and based upon the information available at the time of the patient’s examination, I certify that the medical benefits reasonably to be expected from the provision of appropriate medical treatments at another medical facility outweigh the increasing risks, if any, to the individual’s medical condition, and in the case of labor to the unborn child, from effecting the transfer.    X____________________________________________ DATE 04/12/25        TIME_______      ORIGINAL - SEND TO MEDICAL RECORDS   COPY - SEND WITH PATIENT DURING TRANSFER

## 2025-04-13 NOTE — ED CARE HANDOFF
Emergency Department Sign Out Note        Sign out and transfer of care from Dr. Godfrey. See Separate Emergency Department note.     The patient, Yaritza Dunne, was evaluated by the previous provider for aggressive behavior.    Workup Completed:  ETOH, UDS, UPreg    ED Course / Workup Pending (followup):  Remained stable during my care. Did not need further pharmacologic intervention. 302 signed by prior provider and petitioners. PT subsequently transported.        No data recorded                          ED Course as of 04/14/25 1837   Sat Apr 12, 2025   1712 S/o tried strangling herself. Will need inpatient psych.      Procedures  Medical Decision Making  Amount and/or Complexity of Data Reviewed  Labs: ordered.  Radiology: ordered.    Risk  Decision regarding hospitalization.            Disposition  Final diagnoses:   Suicidal behavior   Aggressive behavior   Intellectual disability   Intermittent explosive disorder     Time reflects when diagnosis was documented in both MDM as applicable and the Disposition within this note       Time User Action Codes Description Comment    4/12/2025  3:00 PM Elis Finn Add [R45.89] Suicidal behavior     4/12/2025  3:00 PM Elis Finn Add [R46.89] Aggressive behavior     4/12/2025  3:00 PM Elis Finn Add [F79] Intellectual disability     4/12/2025  3:00 PM Elis Finn Add [F63.81] Intermittent explosive disorder           ED Disposition       ED Disposition   Transfer to Behavioral Health Condition   --    Date/Time   Sat Apr 12, 2025  3:00 PM    Comment   Yaritza Dunne should be transferred out to  and has been medically cleared.               MD Documentation      Flowsheet Row Most Recent Value   Patient Condition The patient has been stabilized such that within reasonable medical probability, no material deterioration of the patient condition or the condition of the unborn child(elfego) is likely to  result from the transfer   Reason for Transfer Level of Care needed not available at this facility   Benefits of Transfer Specialized equipment and/or services available at the receiving facility (Include comment)________________________   Risks of Transfer Potential for delay in receiving treatment   Accepting Physician Dr. Greco   Accepting Facility Name, [facility]    (Name & Tel number) Segun Valentine [phone]   Transported by (Company and Unit #) [facility]   Sending MD Dr. Ildefonso Martins   Provider Certification Consent was not obtained as patient is committed to psychiatric facility and transfer is mandated          RN Documentation      Flowsheet Row Most Recent Value   Accepting Facility Name, [facility]    (Name & Tel number) Segun Valentine [phone]   Report Given to amberly   Medications Reviewed with Next Provider of Service Yes   Transport Mode Ambulance   Transported by (Company and Unit #) [facility]   Level of Care Basic life support   Copies of Medical Records Sent History and Physical   Patient Belongings Disposition Sent with patient   Transfer Date 04/12/25   Transfer Time 2230          Follow-up Information    None       Discharge Medication List as of 4/12/2025 10:30 PM        CONTINUE these medications which have NOT CHANGED    Details   acetaminophen (TYLENOL) 325 mg tablet Take 650 mg by mouth every 4 (four) hours as needed for mild pain, Historical Med      albuterol (PROVENTIL HFA,VENTOLIN HFA) 90 mcg/act inhaler Inhale 2 puffs every 6 (six) hours as needed for wheezing, Historical Med      ARIPiprazole (ABILIFY) 15 mg tablet Take 1 tablet (15 mg total) by mouth daily, Starting Fri 3/29/2024, Until Sat 8/17/2024, Normal      bacitracin topical ointment 500 units/g topical ointment Apply 1 large application  topically 3 (three) times a day to abrasions topically x 1 week, Starting Mon 1/13/2025, Normal      Calcium 600-D 600-10 MG-MCG TABS Historical Med      Calcium Carb-Cholecalciferol (calcium carbonate-vitamin D) 500 mg-5 mcg tablet Take 1 tablet by mouth 2 (two) times a day with meals, Starting Fri 3/29/2024, Until Sat 8/17/2024, Normal      Cholecalciferol (Vitamin D) 50 MCG (2000 UT) tablet Take 1 tablet (2,000 Units total) by mouth daily, Starting Fri 3/29/2024, Normal      clonazePAM (KlonoPIN) 0.5 mg tablet Take 1 tablet (0.5 mg total) by mouth 2 (two) times a day, Starting Fri 3/29/2024, Until Tue 1/21/2025, Normal      cyanocobalamin (VITAMIN B-12) 1000 MCG tablet Take 1 tablet (1,000 mcg total) by mouth daily, Starting Fri 3/29/2024, Until Sun 4/28/2024, Normal      D3 Super Strength 50 MCG (2000 UT) CAPS Historical Med      dicyclomine (BENTYL) 20 mg tablet Take 1 tablet (20 mg total) by mouth 2 (two) times a day, Starting Thu 7/18/2024, Normal      divalproex sodium (DEPAKOTE ER) 500 mg 24 hr tablet Take 3 tablets (1,500 mg total) by mouth daily at bedtime, Starting Fri 3/29/2024, Until Sat 8/17/2024, Normal      docusate sodium (COLACE) 100 mg capsule Take 1 capsule (100 mg total) by mouth daily, Starting Fri 3/29/2024, Normal      furosemide (LASIX) 40 mg tablet Take 1 tablet (40 mg total) by mouth daily, Starting Sat 3/30/2024, Normal      !! hydrocortisone (CORTEF) 10 mg tablet Historical Med      !! hydrocortisone (CORTEF) 5 mg tablet Take 3 tablets (15 mg total) by mouth daily, Starting Sat 3/30/2024, Normal      ibuprofen (MOTRIN) 800 mg tablet Take 1 tablet (800 mg total) by mouth 2 (two) times a day for 7 days, Starting Tue 1/21/2025, Until Tue 1/28/2025, Normal      !! levothyroxine 100 mcg tablet Historical Med      !! levothyroxine 112 mcg tablet Take 3 tablets (336 mcg total) by mouth 2 (two) times a week On Saturday and Sunday, Starting Mon 4/1/2024, Normal      !! levothyroxine 200 mcg  tablet Take 1 tablet (200 mcg total) by mouth daily Take 2 tablets by mouth Monday-Friday, Starting Fri 3/29/2024, Normal      lidocaine (Lidoderm) 5 % Apply 1 patch topically over 12 hours daily as needed (pain) Remove & Discard patch within 12 hours or as directed by MD, Starting Fri 3/29/2024, Normal      magnesium oxide (MAG-OX) 400 mg tablet Historical Med      magnesium Oxide (MAG-OX) 400 mg TABS Take 1 tablet (400 mg total) by mouth 2 (two) times a day, Starting Fri 3/29/2024, Until Sat 8/17/2024, Normal      meclizine (ANTIVERT) 25 mg tablet Take 1 tablet (25 mg total) by mouth every 12 (twelve) hours as needed for dizziness, Starting Fri 3/29/2024, Normal      meloxicam (MOBIC) 15 mg tablet Take 1 tablet (15 mg total) by mouth daily as needed for moderate pain, Starting Fri 3/29/2024, Normal      Multiple Vitamin (multivitamin) tablet Take 1 tablet by mouth daily, Starting Fri 3/29/2024, Normal      nystatin (MYCOSTATIN) powder Apply topically 2 (two) times a day, Starting Fri 3/29/2024, Normal      Omega-3 Fatty Acids (fish oil) 1,000 mg Take 1 capsule (1,000 mg total) by mouth 3 (three) times a day, Starting Fri 3/29/2024, Normal      !! ondansetron (ZOFRAN-ODT) 4 mg disintegrating tablet Take 1 tablet (4 mg total) by mouth every 6 (six) hours as needed for nausea or vomiting, Starting Thu 7/18/2024, Normal      !! ondansetron (ZOFRAN-ODT) 4 mg disintegrating tablet Take 1 tablet (4 mg total) by mouth every 8 (eight) hours as needed for nausea or vomiting, Starting Mon 1/13/2025, Normal      sertraline (ZOLOFT) 100 mg tablet Take 1 tablet (100 mg total) by mouth daily, Starting Sat 3/30/2024, Until Sat 8/17/2024, Normal      triamcinolone (KENALOG) 0.025 % cream Historical Med      triamcinolone (KENALOG) 0.1 % cream Apply topically 2 (two) times a day as needed (ezcema), Starting Fri 3/29/2024, Normal       !! - Potential duplicate medications found. Please discuss with provider.        No discharge  procedures on file.       ED Provider  Electronically Signed by     Ildefonso Martins DO  04/14/25 [id number]

## 2025-04-13 NOTE — ED NOTES
Bd Searches completed     Reviewing:  [facility]    Denied/ No beds available  Verónica GARCIA [facility] [place]  Alvin Jhaveri

## 2025-04-13 NOTE — ED NOTES
Patient is accepted at [facility].  Patient is accepted by Dr. Fannie FORREST     Transportation is arranged with [facility]    Transportation is scheduled for 10:30PM   Patient may go to the floor at anytime 4/12/2025         Nurse report is to be called to [phone] prior to patient transfer.

## 2025-06-04 ENCOUNTER — HOSPITAL ENCOUNTER (EMERGENCY)
Facility: HOSPITAL | Age: 45
Discharge: HOME | End: 2025-06-05
Attending: EMERGENCY MEDICINE
Payer: MEDICARE

## 2025-06-04 ENCOUNTER — APPOINTMENT (EMERGENCY)
Dept: RADIOLOGY | Facility: HOSPITAL | Age: 45
End: 2025-06-04
Payer: MEDICARE

## 2025-06-04 DIAGNOSIS — B34.9 VIRAL SYNDROME: ICD-10-CM

## 2025-06-04 DIAGNOSIS — R11.2 NAUSEA VOMITING AND DIARRHEA: ICD-10-CM

## 2025-06-04 DIAGNOSIS — R07.9 CHEST PAIN, UNSPECIFIED: Primary | ICD-10-CM

## 2025-06-04 DIAGNOSIS — R19.7 NAUSEA VOMITING AND DIARRHEA: ICD-10-CM

## 2025-06-04 DIAGNOSIS — R10.9 ABDOMINAL PAIN: ICD-10-CM

## 2025-06-04 LAB
ALBUMIN SERPL BCG-MCNC: 3.7 G/DL (ref 3.5–5)
ALP SERPL-CCNC: 63 U/L (ref 34–104)
ALT SERPL W P-5'-P-CCNC: 26 U/L (ref 7–52)
ANION GAP SERPL CALCULATED.3IONS-SCNC: 5 MMOL/L (ref 4–13)
AST SERPL W P-5'-P-CCNC: 21 U/L (ref 13–39)
BASOPHILS # BLD AUTO: 0.03 THOUSANDS/ÂΜL (ref 0–0.1)
BASOPHILS NFR BLD AUTO: 0 % (ref 0–1)
BILIRUB SERPL-MCNC: 0.28 MG/DL (ref 0.2–1)
BUN SERPL-MCNC: 14 MG/DL (ref 5–25)
CALCIUM SERPL-MCNC: 9 MG/DL (ref 8.4–10.2)
CARDIAC TROPONIN I PNL SERPL HS: 4 NG/L (ref ?–50)
CHLORIDE SERPL-SCNC: 102 MMOL/L (ref 96–108)
CO2 SERPL-SCNC: 34 MMOL/L (ref 21–32)
CREAT SERPL-MCNC: 0.62 MG/DL (ref 0.6–1.3)
EOSINOPHIL # BLD AUTO: 0.04 THOUSAND/ÂΜL (ref 0–0.61)
EOSINOPHIL NFR BLD AUTO: 1 % (ref 0–6)
ERYTHROCYTE [DISTWIDTH] IN BLOOD BY AUTOMATED COUNT: 13 % (ref 11.6–15.1)
FLUAV AG UPPER RESP QL IA.RAPID: NEGATIVE
FLUBV AG UPPER RESP QL IA.RAPID: NEGATIVE
GFR SERPL CREATININE-BSD FRML MDRD: 108 ML/MIN/1.73SQ M
GLUCOSE SERPL-MCNC: 88 MG/DL (ref 65–140)
HCG SERPL QL: NEGATIVE
HCT VFR BLD AUTO: 37.9 % (ref 34.8–46.1)
HGB BLD-MCNC: 12.6 G/DL (ref 11.5–15.4)
IMM GRANULOCYTES # BLD AUTO: 0.02 THOUSAND/UL (ref 0–0.2)
IMM GRANULOCYTES NFR BLD AUTO: 0 % (ref 0–2)
LIPASE SERPL-CCNC: 32 U/L (ref 11–82)
LYMPHOCYTES # BLD AUTO: 3.65 THOUSANDS/ÂΜL (ref 0.6–4.47)
LYMPHOCYTES NFR BLD AUTO: 49 % (ref 14–44)
MCH RBC QN AUTO: 29.4 PG (ref 26.8–34.3)
MCHC RBC AUTO-ENTMCNC: 33.2 G/DL (ref 31.4–37.4)
MCV RBC AUTO: 89 FL (ref 82–98)
MONOCYTES # BLD AUTO: 0.63 THOUSAND/ÂΜL (ref 0.17–1.22)
MONOCYTES NFR BLD AUTO: 8 % (ref 4–12)
NEUTROPHILS # BLD AUTO: 3.14 THOUSANDS/ÂΜL (ref 1.85–7.62)
NEUTS SEG NFR BLD AUTO: 42 % (ref 43–75)
NRBC BLD AUTO-RTO: 0 /100 WBCS
PLATELET # BLD AUTO: 224 THOUSANDS/UL (ref 149–390)
PMV BLD AUTO: 9.9 FL (ref 8.9–12.7)
POTASSIUM SERPL-SCNC: 3.9 MMOL/L (ref 3.5–5.3)
PROT SERPL-MCNC: 6.1 G/DL (ref 6.4–8.4)
RBC # BLD AUTO: 4.28 MILLION/UL (ref 3.81–5.12)
S PYO DNA THROAT QL NAA+PROBE: NOT DETECTED
SARS-COV+SARS-COV-2 AG RESP QL IA.RAPID: NEGATIVE
SODIUM SERPL-SCNC: 141 MMOL/L (ref 135–147)
WBC # BLD AUTO: 7.51 THOUSAND/UL (ref 4.31–10.16)

## 2025-06-04 PROCEDURE — 87651 STREP A DNA AMP PROBE: CPT

## 2025-06-04 PROCEDURE — 84484 ASSAY OF TROPONIN QUANT: CPT

## 2025-06-04 PROCEDURE — 96361 HYDRATE IV INFUSION ADD-ON: CPT

## 2025-06-04 PROCEDURE — 85025 COMPLETE CBC W/AUTO DIFF WBC: CPT

## 2025-06-04 PROCEDURE — 87811 SARS-COV-2 COVID19 W/OPTIC: CPT

## 2025-06-04 PROCEDURE — 99285 EMERGENCY DEPT VISIT HI MDM: CPT

## 2025-06-04 PROCEDURE — 84703 CHORIONIC GONADOTROPIN ASSAY: CPT

## 2025-06-04 PROCEDURE — 96375 TX/PRO/DX INJ NEW DRUG ADDON: CPT

## 2025-06-04 PROCEDURE — 87804 INFLUENZA ASSAY W/OPTIC: CPT

## 2025-06-04 PROCEDURE — 83690 ASSAY OF LIPASE: CPT

## 2025-06-04 PROCEDURE — 71045 X-RAY EXAM CHEST 1 VIEW: CPT

## 2025-06-04 PROCEDURE — 80053 COMPREHEN METABOLIC PANEL: CPT

## 2025-06-04 PROCEDURE — 96374 THER/PROPH/DIAG INJ IV PUSH: CPT

## 2025-06-04 PROCEDURE — 93005 ELECTROCARDIOGRAM TRACING: CPT

## 2025-06-04 PROCEDURE — 81001 URINALYSIS AUTO W/SCOPE: CPT

## 2025-06-04 PROCEDURE — 36415 COLL VENOUS BLD VENIPUNCTURE: CPT

## 2025-06-04 RX ORDER — ONDANSETRON 2 MG/ML
4 INJECTION INTRAMUSCULAR; INTRAVENOUS ONCE
Status: COMPLETED | OUTPATIENT
Start: 2025-06-04 | End: 2025-06-04

## 2025-06-04 RX ORDER — KETOROLAC TROMETHAMINE 30 MG/ML
15 INJECTION, SOLUTION INTRAMUSCULAR; INTRAVENOUS ONCE
Status: COMPLETED | OUTPATIENT
Start: 2025-06-04 | End: 2025-06-04

## 2025-06-04 RX ADMIN — ONDANSETRON 4 MG: 2 INJECTION INTRAMUSCULAR; INTRAVENOUS at 23:07

## 2025-06-04 RX ADMIN — SODIUM CHLORIDE 1000 ML: 0.9 INJECTION, SOLUTION INTRAVENOUS at 23:02

## 2025-06-04 RX ADMIN — KETOROLAC TROMETHAMINE 15 MG: 30 INJECTION, SOLUTION INTRAMUSCULAR; INTRAVENOUS at 23:08

## 2025-06-05 ENCOUNTER — APPOINTMENT (EMERGENCY)
Dept: CT IMAGING | Facility: HOSPITAL | Age: 45
End: 2025-06-05
Payer: MEDICARE

## 2025-06-05 VITALS
TEMPERATURE: 98.1 F | SYSTOLIC BLOOD PRESSURE: 117 MMHG | DIASTOLIC BLOOD PRESSURE: 73 MMHG | RESPIRATION RATE: 18 BRPM | OXYGEN SATURATION: 100 % | HEART RATE: 57 BPM

## 2025-06-05 LAB
2HR DELTA HS TROPONIN: 0 NG/L
ATRIAL RATE: 66 BPM
BACTERIA UR QL AUTO: ABNORMAL /HPF
BILIRUB UR QL STRIP: NEGATIVE
CARDIAC TROPONIN I PNL SERPL HS: 4 NG/L (ref ?–50)
CLARITY UR: CLEAR
COLOR UR: YELLOW
GLUCOSE UR STRIP-MCNC: NEGATIVE MG/DL
HGB UR QL STRIP.AUTO: NEGATIVE
KETONES UR STRIP-MCNC: NEGATIVE MG/DL
LEUKOCYTE ESTERASE UR QL STRIP: ABNORMAL
NITRITE UR QL STRIP: NEGATIVE
NON-SQ EPI CELLS URNS QL MICRO: ABNORMAL /HPF
P AXIS: 43 DEGREES
PH UR STRIP.AUTO: 8.5 [PH]
PR INTERVAL: 178 MS
PROT UR STRIP-MCNC: ABNORMAL MG/DL
QRS AXIS: 59 DEGREES
QRSD INTERVAL: 100 MS
QT INTERVAL: 416 MS
QTC INTERVAL: 436 MS
RBC #/AREA URNS AUTO: ABNORMAL /HPF
SP GR UR STRIP.AUTO: 1.02 (ref 1–1.03)
T WAVE AXIS: 64 DEGREES
UROBILINOGEN UR STRIP-ACNC: <2 MG/DL
VENTRICULAR RATE: 66 BPM
WBC #/AREA URNS AUTO: ABNORMAL /HPF

## 2025-06-05 PROCEDURE — 74177 CT ABD & PELVIS W/CONTRAST: CPT

## 2025-06-05 PROCEDURE — 84484 ASSAY OF TROPONIN QUANT: CPT

## 2025-06-05 PROCEDURE — 93010 ELECTROCARDIOGRAM REPORT: CPT | Performed by: STUDENT IN AN ORGANIZED HEALTH CARE EDUCATION/TRAINING PROGRAM

## 2025-06-05 PROCEDURE — 96361 HYDRATE IV INFUSION ADD-ON: CPT

## 2025-06-05 PROCEDURE — 36415 COLL VENOUS BLD VENIPUNCTURE: CPT

## 2025-06-05 RX ORDER — ACETAMINOPHEN 325 MG/1
650 TABLET ORAL ONCE
Status: COMPLETED | OUTPATIENT
Start: 2025-06-05 | End: 2025-06-05

## 2025-06-05 RX ORDER — ONDANSETRON 4 MG/1
4 TABLET, ORALLY DISINTEGRATING ORAL EVERY 6 HOURS PRN
Qty: 12 TABLET | Refills: 0 | Status: SHIPPED | OUTPATIENT
Start: 2025-06-05 | End: 2025-06-05

## 2025-06-05 RX ORDER — DICYCLOMINE HCL 20 MG
20 TABLET ORAL 2 TIMES DAILY
Qty: 20 TABLET | Refills: 0 | Status: SHIPPED | OUTPATIENT
Start: 2025-06-05 | End: 2025-06-05

## 2025-06-05 RX ORDER — DICYCLOMINE HCL 20 MG
20 TABLET ORAL ONCE
Status: COMPLETED | OUTPATIENT
Start: 2025-06-05 | End: 2025-06-05

## 2025-06-05 RX ORDER — DICYCLOMINE HCL 20 MG
20 TABLET ORAL 2 TIMES DAILY
Qty: 20 TABLET | Refills: 0 | Status: SHIPPED | OUTPATIENT
Start: 2025-06-05

## 2025-06-05 RX ORDER — ONDANSETRON 4 MG/1
4 TABLET, ORALLY DISINTEGRATING ORAL EVERY 6 HOURS PRN
Qty: 12 TABLET | Refills: 0 | Status: SHIPPED | OUTPATIENT
Start: 2025-06-05

## 2025-06-05 RX ADMIN — IOHEXOL 100 ML: 350 INJECTION, SOLUTION INTRAVENOUS at 01:18

## 2025-06-05 RX ADMIN — DICYCLOMINE HYDROCHLORIDE 20 MG: 20 TABLET ORAL at 03:23

## 2025-06-05 RX ADMIN — ACETAMINOPHEN 650 MG: 325 TABLET ORAL at 03:23

## 2025-06-05 NOTE — ED PROVIDER NOTES
Time reflects when diagnosis was documented in both MDM as applicable and the Disposition within this note       Time User Action Codes Description Comment    6/5/2025  4:16 AM Tono Skelton Add [R07.9] Chest pain, unspecified     6/5/2025  4:16 AM Tono Skelton Add [R10.9] Abdominal pain     6/5/2025  4:16 AM Tono Skelton Add [R11.2,  R19.7] Nausea vomiting and diarrhea     6/5/2025  4:16 AM Tono Skelton Add [B34.9] Viral syndrome           ED Disposition       ED Disposition   Discharge    Condition   Stable    Date/Time   Thu Jun 5, 2025  4:16 AM    Comment   Yaritza Uzair discharge to home/self care.                   Assessment & Plan       Medical Decision Making  Abdominal exam without peritoneal signs. No evidence of acute abdomen at this time. Well appearing. Given work up, low suspicion for acute hepatobiliary disease (including acute cholecystitis or cholangitis), acute pancreatitis (neg lipase), PUD (including gastric perforation), acute infectious processes (pneumonia, hepatitis, pyelonephritis), acute appendicitis, vascular catastrophe, bowel obstruction, viscus perforation, or genital torsion, diverticulitis.   Ct abdomen and pelvis showed No bowel obstruction. Normal appendix.  Mild hepatic steatosis.  Patient likely has a virus and gastroenteritis.  Exam without evidence of volume overload so doubt heart failure. EKG without signs of active ischemia. Given the timing of pain to ER presentation, initial troponin 4 delta troponin 0 so doubt NSTEMI. Presentation not consistent with acute PE (Wells low risk PERC negative),pneumothorax (not visualized on chest xr), thoracic aortic dissection, pericarditis, tamponade, pneumonia (no infectious symptoms, clear chest xr), myocarditis (no recent illness, neg trop).   HEART score: 3 so plan to discharge patient home with PCP follow up. Prior to discharge, discharge instructions were discussed with patient at bedside. Patient was provided both verbal and  written instructions. Patient is understanding of the discharge instructions and is agreeable to plan of care. Return precautions were discussed with patient bedside, patient verbalized understanding of signs and symptoms that would necessitate return to the ED. All questions were answered. Patient was comfortable with the plan of care and discharged to home.       Problems Addressed:  Abdominal pain: acute illness or injury  Chest pain, unspecified: acute illness or injury  Nausea vomiting and diarrhea: acute illness or injury  Viral syndrome: acute illness or injury    Amount and/or Complexity of Data Reviewed  Labs: ordered.  Radiology: ordered and independent interpretation performed. Decision-making details documented in ED Course.    Risk  OTC drugs.  Prescription drug management.        ED Course as of 06/05/25 0526   Thu Jun 05, 202505, 2025 0311 CT abdomen pelvis with contrast  No bowel obstruction. Normal appendix.     Mild hepatic steatosis.     Other nonemergent and chronic findings as above         Medications   sodium chloride 0.9 % bolus 1,000 mL (0 mL Intravenous Stopped 6/5/25 0428)   ondansetron (ZOFRAN) injection 4 mg (4 mg Intravenous Given 6/4/25 2307)   ketorolac (TORADOL) injection 15 mg (15 mg Intravenous Given 6/4/25 2308)   iohexol (OMNIPAQUE) 350 MG/ML injection (MULTI-DOSE) 100 mL (100 mL Intravenous Given 6/5/25 0118)   dicyclomine (BENTYL) tablet 20 mg (20 mg Oral Given 6/5/25 0323)   acetaminophen (TYLENOL) tablet 650 mg (650 mg Oral Given 6/5/25 0323)       ED Risk Strat Scores   HEART Risk Score      Flowsheet Row Most Recent Value   Heart Score Risk Calculator    History 0 Filed at: 06/05/2025 0525   ECG 1 Filed at: 06/05/2025 0525   Age 1 Filed at: 06/05/2025 0525   Risk Factors 1 Filed at: 06/05/2025 0525   Troponin 0 Filed at: 06/05/2025 0525   HEART Score 3 Filed at: 06/05/2025 0525          HEART Risk Score      Flowsheet Row Most Recent Value   Heart Score Risk Calculator    History  0 Filed at: 06/05/2025 0525   ECG 1 Filed at: 06/05/2025 0525   Age 1 Filed at: 06/05/2025 0525   Risk Factors 1 Filed at: 06/05/2025 0525   Troponin 0 Filed at: 06/05/2025 0525   HEART Score 3 Filed at: 06/05/2025 0525                      No data recorded        SBIRT 20yo+      Flowsheet Row Most Recent Value   Initial Alcohol Screen: US AUDIT-C     1. How often do you have a drink containing alcohol? 0 Filed at: 06/04/2025 2241   2. How many drinks containing alcohol do you have on a typical day you are drinking?  0 Filed at: 06/04/2025 2241   3b. FEMALE Any Age, or MALE 65+: How often do you have 4 or more drinks on one occassion? 0 Filed at: 06/04/2025 2241   Audit-C Score 0 Filed at: 06/04/2025 2241   FABY: How many times in the past year have you...    Used an illegal drug or used a prescription medication for non-medical reasons? Never Filed at: 06/04/2025 2241                            History of Present Illness       Chief Complaint   Patient presents with    Abdominal Pain     Pt arrives from [facility] home via EMS with c/o abdominal pain, vomiting, diarrhea, sore throat, and chest pain. Pt states that she has felt this way for 4 days now     Chest Pain       Past Medical History[1]   Past Surgical History[2]   Family History[3]   Social History[4]   E-Cigarette/Vaping    E-Cigarette Use Never User       E-Cigarette/Vaping Substances    Nicotine No     THC No     CBD No     Flavoring No     Other No     Unknown No       I have reviewed and agree with the history as documented.     The patient is a 45 y.o. female with a history of adrenocortical insufficiency, bipolar, CVA, epilepsy, hyperlipidemia, hypopituitarism, psychiatric disorder, who presents to [facility] with a chief complaint of abdominal pain. Symptoms began 4 days ago and have been constant since onset. Her pain is currently rated as a 6/10 in severity and described as sharp without radiation. Associated symptoms include chest  pain, sore throat, abdominal pain, nausea, vomiting, and diarrhea. Symptoms are aggravated with none noted and alleviating factors include none noted. The patient denies fever, chills, cough, sputum, shortness of breath, dizziness, numbness, tingling, weakness, falls, trauma, syncope, hemoptysis, hematochezia, melena. No other reported symptoms at this time.  Patient affirms allergies to haldol, thioridazine             History provided by:  Patient   used: No    Abdominal Pain  Associated symptoms: chest pain    Associated symptoms: no chills, no cough, no dysuria, no fever, no hematuria, no shortness of breath, no sore throat and no vomiting    Chest Pain  Associated symptoms: abdominal pain    Associated symptoms: no back pain, no cough, no dizziness, no fever, no headache, no palpitations, no shortness of breath and not vomiting        Review of Systems   Constitutional:  Negative for chills and fever.   HENT:  Negative for ear pain and sore throat.    Eyes:  Negative for pain and visual disturbance.   Respiratory:  Negative for cough and shortness of breath.    Cardiovascular:  Positive for chest pain. Negative for palpitations.   Gastrointestinal:  Positive for abdominal pain. Negative for vomiting.   Genitourinary:  Negative for dysuria and hematuria.   Musculoskeletal:  Negative for arthralgias and back pain.   Skin:  Negative for color change and rash.   Neurological:  Negative for dizziness, seizures, syncope, facial asymmetry, light-headedness and headaches.   All other systems reviewed and are negative.          Objective       ED Triage Vitals   Temperature Pulse Blood Pressure Respirations SpO2 Patient Position - Orthostatic VS   06/04/25 2351 06/04/25 2227 06/04/25 2227 06/04/25 2227 06/04/25 2227 06/04/25 2227   98.1 °F (36.7 °C) 71 154/75 15 99 % Lying      Temp Source Heart Rate Source BP Location FiO2 (%) Pain Score    06/04/25 2351 06/04/25 2227 06/04/25 2227 -- 06/04/25 2308     Oral Monitor Right arm  10 - Worst Possible Pain      Vitals      Date and Time Temp Pulse SpO2 Resp BP Pain Score FACES Pain Rating User   06/05/25 0400 -- 57 100 % 18 117/73 -- -- AM   06/05/25 0330 -- 56 100 % 18 123/68 -- -- AM   06/05/25 0323 -- -- -- -- -- 7 -- AM   06/05/25 0230 -- 59 100 % 18 117/67 -- -- AM   06/05/25 0200 -- 59 98 % 18 117/70 -- --    06/05/25 0000 -- 65 99 % 17 122/87 -- --    06/04/25 2351 98.1 °F (36.7 °C) -- -- -- -- -- --    06/04/25 2308 -- -- -- -- -- 10 - Worst Possible Pain --    06/04/25 2227 -- 71 99 % 15 154/75 -- -- VMW            Physical Exam  Vitals reviewed.   Constitutional:       General: She is not in acute distress.     Appearance: She is not ill-appearing or toxic-appearing.   HENT:      Head: Normocephalic.      Mouth/Throat:      Mouth: Mucous membranes are moist.      Pharynx: Oropharynx is clear. No pharyngeal swelling or oropharyngeal exudate.     Eyes:      General: No scleral icterus.      Cardiovascular:      Rate and Rhythm: Normal rate.   Pulmonary:      Effort: Pulmonary effort is normal. No respiratory distress.      Breath sounds: No stridor. No wheezing, rhonchi or rales.   Abdominal:      General: Abdomen is flat. Bowel sounds are normal.      Palpations: Abdomen is soft.      Tenderness: There is generalized abdominal tenderness. Negative signs include Benjamin's sign and McBurney's sign.     Skin:     General: Skin is warm and dry.      Capillary Refill: Capillary refill takes less than 2 seconds.      Coloration: Skin is not cyanotic, jaundiced, mottled or pale.      Findings: No erythema.     Neurological:      Mental Status: She is alert and oriented to person, place, and time.         Results Reviewed       Procedure Component Value Units Date/Time    HS Troponin I 2hr [[id number]]  (Normal) Collected: 06/05/25 0142    Lab Status: Final result Specimen: Blood from Arm, Right Updated: 06/05/25 0211     hs TnI 2hr 4 ng/L      Delta 2hr hsTnI  0 ng/L     Urine Microscopic [[id number]]  (Abnormal) Collected: 06/04/25 2351    Lab Status: Final result Specimen: Urine, Clean Catch Updated: 06/05/25 0021     RBC, UA 1-2 /hpf      WBC, UA 4-10 /hpf      Epithelial Cells Occasional /hpf      Bacteria, UA None Seen /hpf     UA w Reflex to Microscopic w Reflex to Culture [[id number]]  (Abnormal) Collected: 06/04/25 2351    Lab Status: Final result Specimen: Urine, Clean Catch Updated: 06/05/25 0013     Color, UA Yellow     Clarity, UA Clear     Specific Gravity, UA 1.024     pH, UA 8.5     Leukocytes, UA Trace     Nitrite, UA Negative     Protein, UA Trace mg/dl      Glucose, UA Negative mg/dl      Ketones, UA Negative mg/dl      Urobilinogen, UA <2.0 mg/dl      Bilirubin, UA Negative     Occult Blood, UA Negative    FLU/COVID Rapid Antigen (30 min. TAT) - Preferred screening test in ED [[id number]]  (Normal) Collected: 06/04/25 2302    Lab Status: Final result Specimen: Nares from Nose Updated: 06/04/25 2334     SARS COV Rapid Antigen Negative     Influenza A Rapid Antigen Negative     Influenza B Rapid Antigen Negative    Narrative:      This test has been performed using the [employer] Lexy 2 FLU+SARS Antigen test under the Emergency Use Authorization (EUA). This test has been validated by the  and verified by the performing laboratory. The Lexy uses lateral flow immunofluorescent sandwich assay to detect SARS-COV, Influenza A and Influenza B Antigen.     The [employer] Lexy 2 SARS Antigen test does not differentiate between SARS-CoV and SARS-CoV-2.     Negative results are presumptive and may be confirmed with a molecular assay, if necessary, for patient management. Negative results do not rule out SARS-CoV-2 or influenza infection and should not be used as the sole basis for treatment or patient management decisions. A negative test result may occur if the level of antigen in a sample is below the limit of detection of this test.     Positive results are  indicative of the presence of viral antigens, but do not rule out bacterial infection or co-infection with other viruses.     All test results should be used as an adjunct to clinical observations and other information available to the provider.    FOR PEDIATRIC PATIENTS - copy/paste COVID Guidelines URL to browser: [email or web address]    hCG, qualitative pregnancy [[id number]]  (Normal) Collected: 06/04/25 2302    Lab Status: Final result Specimen: Blood from Arm, Right Updated: 06/04/25 2334     Preg, Serum Negative    Strep A PCR [[id number]]  (Normal) Collected: 06/04/25 2302    Lab Status: Final result Specimen: Throat Updated: 06/04/25 2334     STREP A PCR Not Detected    HS Troponin 0hr (reflex protocol) [[id number]]  (Normal) Collected: 06/04/25 2302    Lab Status: Final result Specimen: Blood from Arm, Right Updated: 06/04/25 2333     hs TnI 0hr 4 ng/L     Comprehensive metabolic panel [[id number]]  (Abnormal) Collected: 06/04/25 2302    Lab Status: Final result Specimen: Blood from Arm, Right Updated: 06/04/25 2327     Sodium 141 mmol/L      Potassium 3.9 mmol/L      Chloride 102 mmol/L      CO2 34 mmol/L      ANION GAP 5 mmol/L      BUN 14 mg/dL      Creatinine 0.62 mg/dL      Glucose 88 mg/dL      Calcium 9.0 mg/dL      AST 21 U/L      ALT 26 U/L      Alkaline Phosphatase 63 U/L      Total Protein 6.1 g/dL      Albumin 3.7 g/dL      Total Bilirubin 0.28 mg/dL      eGFR 108 ml/min/1.73sq m     Narrative:      National Kidney Disease Foundation guidelines for Chronic Kidney Disease (CKD):     Stage 1 with normal or high GFR (GFR > 90 mL/min/1.73 square meters)    Stage 2 Mild CKD (GFR = 60-89 mL/min/1.73 square meters)    Stage 3A Moderate CKD (GFR = 45-59 mL/min/1.73 square meters)    Stage 3B Moderate CKD (GFR = 30-44 mL/min/1.73 square meters)    Stage 4 Severe CKD (GFR = 15-29 mL/min/1.73 square meters)    Stage 5 End Stage CKD (GFR <15 mL/min/1.73  square meters)  Note: GFR calculation is accurate only with a steady state creatinine    Lipase [[id number]]  (Normal) Collected: 06/04/25 2302    Lab Status: Final result Specimen: Blood from Arm, Right Updated: 06/04/25 2327     Lipase 32 u/L     CBC and differential [[id number]]  (Abnormal) Collected: 06/04/25 2302    Lab Status: Final result Specimen: Blood from Arm, Right Updated: 06/04/25 2309     WBC 7.51 Thousand/uL      RBC 4.28 Million/uL      Hemoglobin 12.6 g/dL      Hematocrit 37.9 %      MCV 89 fL      MCH 29.4 pg      MCHC 33.2 g/dL      RDW 13.0 %      MPV 9.9 fL      Platelets 224 Thousands/uL      nRBC 0 /100 WBCs      Segmented % 42 %      Immature Grans % 0 %      Lymphocytes % 49 %      Monocytes % 8 %      Eosinophils Relative 1 %      Basophils Relative 0 %      Absolute Neutrophils 3.14 Thousands/µL      Absolute Immature Grans 0.02 Thousand/uL      Absolute Lymphocytes 3.65 Thousands/µL      Absolute Monocytes 0.63 Thousand/µL      Eosinophils Absolute 0.04 Thousand/µL      Basophils Absolute 0.03 Thousands/µL             CT abdomen pelvis with contrast   Final Interpretation by Keshia Reno MD (06/05 0304)      No bowel obstruction. Normal appendix.      Mild hepatic steatosis.      Other nonemergent and chronic findings as above         Workstation performed: [id number]         XR chest 1 view portable   ED Interpretation by Tono Skelton PA-C (06/05 0247)   No acute cardiopulmonary disease          Procedures    ED Medication and Procedure Management   Prior to Admission Medications   Prescriptions Last Dose Informant Patient Reported? Taking?   ARIPiprazole (ABILIFY) 15 mg tablet   No No   Sig: Take 1 tablet (15 mg total) by mouth daily   Calcium 600-D 600-10 MG-MCG TABS   Yes No   Calcium Carb-Cholecalciferol (calcium carbonate-vitamin D) 500 mg-5 mcg tablet   No No   Sig: Take 1 tablet by mouth 2 (two) times a day with meals   Cholecalciferol (Vitamin D) 50 MCG (2000 UT) tablet    No No   Sig: Take 1 tablet (2,000 Units total) by mouth daily   D3 Super Strength 50 MCG (2000 UT) CAPS   Yes No   Multiple Vitamin (multivitamin) tablet   No No   Sig: Take 1 tablet by mouth daily   Omega-3 Fatty Acids (fish oil) 1,000 mg   No No   Sig: Take 1 capsule (1,000 mg total) by mouth 3 (three) times a day   acetaminophen (TYLENOL) 325 mg tablet  Care Giver, Self Yes No   Sig: Take 650 mg by mouth every 4 (four) hours as needed for mild pain   albuterol (PROVENTIL HFA,VENTOLIN HFA) 90 mcg/act inhaler  Care Giver, Self Yes No   Sig: Inhale 2 puffs every 6 (six) hours as needed for wheezing   bacitracin topical ointment 500 units/g topical ointment   No No   Sig: Apply 1 large application topically 3 (three) times a day to abrasions topically x 1 week   clonazePAM (KlonoPIN) 0.5 mg tablet   No No   Sig: Take 1 tablet (0.5 mg total) by mouth 2 (two) times a day   cyanocobalamin (VITAMIN B-12) 1000 MCG tablet   No No   Sig: Take 1 tablet (1,000 mcg total) by mouth daily   divalproex sodium (DEPAKOTE ER) 500 mg 24 hr tablet   No No   Sig: Take 3 tablets (1,500 mg total) by mouth daily at bedtime   docusate sodium (COLACE) 100 mg capsule   No No   Sig: Take 1 capsule (100 mg total) by mouth daily   furosemide (LASIX) 40 mg tablet   No No   Sig: Take 1 tablet (40 mg total) by mouth daily   hydrocortisone (CORTEF) 10 mg tablet   Yes No   hydrocortisone (CORTEF) 5 mg tablet   No No   Sig: Take 3 tablets (15 mg total) by mouth daily   ibuprofen (MOTRIN) 800 mg tablet   No No   Sig: Take 1 tablet (800 mg total) by mouth 2 (two) times a day for 7 days   levothyroxine 100 mcg tablet   Yes No   Patient not taking: Reported on 1/21/2025   levothyroxine 112 mcg tablet   No No   Sig: Take 3 tablets (336 mcg total) by mouth 2 (two) times a week On Saturday and Sunday   levothyroxine 200 mcg tablet   No No   Sig: Take 1 tablet (200 mcg total) by mouth daily Take 2 tablets by mouth Monday-Friday   lidocaine (Lidoderm) 5 %    No No   Sig: Apply 1 patch topically over 12 hours daily as needed (pain) Remove & Discard patch within 12 hours or as directed by MD   Patient not taking: Reported on 1/21/2025   magnesium Oxide (MAG-OX) 400 mg TABS   No No   Sig: Take 1 tablet (400 mg total) by mouth 2 (two) times a day   magnesium oxide (MAG-OX) 400 mg tablet   Yes No   Patient not taking: Reported on 1/21/2025   meclizine (ANTIVERT) 25 mg tablet   No No   Sig: Take 1 tablet (25 mg total) by mouth every 12 (twelve) hours as needed for dizziness   meloxicam (MOBIC) 15 mg tablet   No No   Sig: Take 1 tablet (15 mg total) by mouth daily as needed for moderate pain   nystatin (MYCOSTATIN) powder   No No   Sig: Apply topically 2 (two) times a day   sertraline (ZOLOFT) 100 mg tablet   No No   Sig: Take 1 tablet (100 mg total) by mouth daily   triamcinolone (KENALOG) 0.025 % cream   Yes No   Patient not taking: Reported on 1/21/2025   triamcinolone (KENALOG) 0.1 % cream   No No   Sig: Apply topically 2 (two) times a day as needed (ezcema)   Patient not taking: Reported on 1/21/2025      Facility-Administered Medications: None     Discharge Medication List as of 6/5/2025  4:17 AM        START taking these medications    Details   dicyclomine (BENTYL) 20 mg tablet Take 1 tablet (20 mg total) by mouth 2 (two) times a day, Starting u 6/5/2025, Normal      ondansetron (ZOFRAN-ODT) 4 mg disintegrating tablet Take 1 tablet (4 mg total) by mouth every 6 (six) hours as needed for nausea or vomiting, Starting Thu 6/5/2025, Normal           CONTINUE these medications which have NOT CHANGED    Details   acetaminophen (TYLENOL) 325 mg tablet Take 650 mg by mouth every 4 (four) hours as needed for mild pain, Historical Med      albuterol (PROVENTIL HFA,VENTOLIN HFA) 90 mcg/act inhaler Inhale 2 puffs every 6 (six) hours as needed for wheezing, Historical Med      ARIPiprazole (ABILIFY) 15 mg tablet Take 1 tablet (15 mg total) by mouth daily, Starting Fri 3/29/2024,  Until Sat 8/17/2024, Normal      bacitracin topical ointment 500 units/g topical ointment Apply 1 large application topically 3 (three) times a day to abrasions topically x 1 week, Starting Mon 1/13/2025, Normal      Calcium 600-D 600-10 MG-MCG TABS Historical Med      Calcium Carb-Cholecalciferol (calcium carbonate-vitamin D) 500 mg-5 mcg tablet Take 1 tablet by mouth 2 (two) times a day with meals, Starting Fri 3/29/2024, Until Sat 8/17/2024, Normal      Cholecalciferol (Vitamin D) 50 MCG (2000 UT) tablet Take 1 tablet (2,000 Units total) by mouth daily, Starting Fri 3/29/2024, Normal      clonazePAM (KlonoPIN) 0.5 mg tablet Take 1 tablet (0.5 mg total) by mouth 2 (two) times a day, Starting Fri 3/29/2024, Until Tue 1/21/2025, Normal      cyanocobalamin (VITAMIN B-12) 1000 MCG tablet Take 1 tablet (1,000 mcg total) by mouth daily, Starting Fri 3/29/2024, Until Sun 4/28/2024, Normal      D3 Super Strength 50 MCG (2000 UT) CAPS Historical Med      divalproex sodium (DEPAKOTE ER) 500 mg 24 hr tablet Take 3 tablets (1,500 mg total) by mouth daily at bedtime, Starting Fri 3/29/2024, Until Sat 8/17/2024, Normal      docusate sodium (COLACE) 100 mg capsule Take 1 capsule (100 mg total) by mouth daily, Starting Fri 3/29/2024, Normal      furosemide (LASIX) 40 mg tablet Take 1 tablet (40 mg total) by mouth daily, Starting Sat 3/30/2024, Normal      !! hydrocortisone (CORTEF) 10 mg tablet Historical Med      !! hydrocortisone (CORTEF) 5 mg tablet Take 3 tablets (15 mg total) by mouth daily, Starting Sat 3/30/2024, Normal      ibuprofen (MOTRIN) 800 mg tablet Take 1 tablet (800 mg total) by mouth 2 (two) times a day for 7 days, Starting Tue 1/21/2025, Until Tue 1/28/2025, Normal      !! levothyroxine 100 mcg tablet Historical Med      !! levothyroxine 112 mcg tablet Take 3 tablets (336 mcg total) by mouth 2 (two) times a week On Saturday and Sunday, Starting Mon 4/1/2024, Normal      !! levothyroxine 200 mcg tablet Take 1  tablet (200 mcg total) by mouth daily Take 2 tablets by mouth Monday-Friday, Starting Fri 3/29/2024, Normal      lidocaine (Lidoderm) 5 % Apply 1 patch topically over 12 hours daily as needed (pain) Remove & Discard patch within 12 hours or as directed by MD, Starting Fri 3/29/2024, Normal      magnesium oxide (MAG-OX) 400 mg tablet Historical Med      magnesium Oxide (MAG-OX) 400 mg TABS Take 1 tablet (400 mg total) by mouth 2 (two) times a day, Starting Fri 3/29/2024, Until Sat 8/17/2024, Normal      meclizine (ANTIVERT) 25 mg tablet Take 1 tablet (25 mg total) by mouth every 12 (twelve) hours as needed for dizziness, Starting Fri 3/29/2024, Normal      meloxicam (MOBIC) 15 mg tablet Take 1 tablet (15 mg total) by mouth daily as needed for moderate pain, Starting Fri 3/29/2024, Normal      Multiple Vitamin (multivitamin) tablet Take 1 tablet by mouth daily, Starting Fri 3/29/2024, Normal      nystatin (MYCOSTATIN) powder Apply topically 2 (two) times a day, Starting Fri 3/29/2024, Normal      Omega-3 Fatty Acids (fish oil) 1,000 mg Take 1 capsule (1,000 mg total) by mouth 3 (three) times a day, Starting Fri 3/29/2024, Normal      sertraline (ZOLOFT) 100 mg tablet Take 1 tablet (100 mg total) by mouth daily, Starting Sat 3/30/2024, Until Sat 8/17/2024, Normal      triamcinolone (KENALOG) 0.025 % cream Historical Med      triamcinolone (KENALOG) 0.1 % cream Apply topically 2 (two) times a day as needed (ezcema), Starting Fri 3/29/2024, Normal       !! - Potential duplicate medications found. Please discuss with provider.        No discharge procedures on file.  ED SEPSIS DOCUMENTATION   Time reflects when diagnosis was documented in both MDM as applicable and the Disposition within this note       Time User Action Codes Description Comment    6/5/2025  4:16 AM Tono Skelton Add [R07.9] Chest pain, unspecified     6/5/2025  4:16 AM Tono Skelton Add [R10.9] Abdominal pain     6/5/2025  4:16 AM Tono Skelton Add  [R11.2,  R19.7] Nausea vomiting and diarrhea     6/5/2025  4:16 AM Tono Skelton Add [B34.9] Viral syndrome                    [1]   Past Medical History:  Diagnosis Date    Adrenocortical insufficiency (HCC)     Bipolar disorder (HCC)     CVA (cerebral vascular accident) (HCC)     Disease of thyroid gland     hypo    Disorder of bone density and structure, unspecified     Epilepsy (HCC)     Hyperlipidemia     Hypopituitarism (HCC)     Mild intellectual disabilities     Psychiatric disorder     bipolar   [2]   Past Surgical History:  Procedure Laterality Date    EPIDURAL BLOCK INJECTION N/A 8/23/2023    Procedure: L3-L4  LUMBAR epidural steroid injection (48033);  Surgeon: Adam Hayden DO;  Location: [facility] MAIN OR;  Service: Pain Management    [3] No family history on file.  [4]   Social History  Tobacco Use    Smoking status: Never    Smokeless tobacco: Never   Vaping Use    Vaping status: Never Used   Substance Use Topics    Alcohol use: Not Currently    Drug use: Not Currently        Tono Skelton PA-C  06/05/25 [id number]

## 2025-06-05 NOTE — DISCHARGE INSTRUCTIONS
Follow-up with PCP.  Rest and hydrate.  Clear liquids advancing to a bland diet as discussed.  Zofran and Bentyl as needed for abdominal pain.  Tylenol and Motrin as needed for any sore throat or fevers.  If any symptoms worsen or new symptoms appear return to the ER.

## 2025-06-17 ENCOUNTER — APPOINTMENT (EMERGENCY)
Dept: RADIOLOGY | Facility: HOSPITAL | Age: 45
End: 2025-06-17
Payer: MEDICARE

## 2025-06-17 ENCOUNTER — APPOINTMENT (EMERGENCY)
Dept: CT IMAGING | Facility: HOSPITAL | Age: 45
End: 2025-06-17
Payer: MEDICARE

## 2025-06-17 ENCOUNTER — HOSPITAL ENCOUNTER (EMERGENCY)
Facility: HOSPITAL | Age: 45
Discharge: HOME | End: 2025-06-17
Attending: EMERGENCY MEDICINE | Admitting: EMERGENCY MEDICINE
Payer: MEDICARE

## 2025-06-17 VITALS
HEART RATE: 71 BPM | HEIGHT: 68 IN | SYSTOLIC BLOOD PRESSURE: 127 MMHG | OXYGEN SATURATION: 98 % | DIASTOLIC BLOOD PRESSURE: 79 MMHG | TEMPERATURE: 98.1 F | RESPIRATION RATE: 18 BRPM | BODY MASS INDEX: 42.9 KG/M2 | WEIGHT: 283.07 LBS

## 2025-06-17 DIAGNOSIS — R11.10 VOMITING: Primary | ICD-10-CM

## 2025-06-17 DIAGNOSIS — R10.9 ABDOMINAL PAIN: ICD-10-CM

## 2025-06-17 LAB
2HR DELTA HS TROPONIN: 0 NG/L
ALBUMIN SERPL BCG-MCNC: 3.8 G/DL (ref 3.5–5)
ALP SERPL-CCNC: 62 U/L (ref 34–104)
ALT SERPL W P-5'-P-CCNC: 24 U/L (ref 7–52)
ANION GAP SERPL CALCULATED.3IONS-SCNC: 8 MMOL/L (ref 4–13)
AST SERPL W P-5'-P-CCNC: 21 U/L (ref 13–39)
ATRIAL RATE: 70 BPM
BASOPHILS # BLD AUTO: 0.02 THOUSANDS/ÂΜL (ref 0–0.1)
BASOPHILS NFR BLD AUTO: 0 % (ref 0–1)
BILIRUB SERPL-MCNC: 0.38 MG/DL (ref 0.2–1)
BUN SERPL-MCNC: 18 MG/DL (ref 5–25)
C COLI+JEJUNI TUF STL QL NAA+PROBE: NEGATIVE
C DIFF TOX A+B STL QL IA: NEGATIVE
C DIFF TOX GENS STL QL NAA+PROBE: POSITIVE
CALCIUM SERPL-MCNC: 9 MG/DL (ref 8.4–10.2)
CARDIAC TROPONIN I PNL SERPL HS: 5 NG/L (ref ?–50)
CARDIAC TROPONIN I PNL SERPL HS: 5 NG/L (ref ?–50)
CHLORIDE SERPL-SCNC: 102 MMOL/L (ref 96–108)
CO2 SERPL-SCNC: 28 MMOL/L (ref 21–32)
CREAT SERPL-MCNC: 0.63 MG/DL (ref 0.6–1.3)
EC STX1+STX2 GENES STL QL NAA+PROBE: NEGATIVE
EOSINOPHIL # BLD AUTO: 0.03 THOUSAND/ÂΜL (ref 0–0.61)
EOSINOPHIL NFR BLD AUTO: 1 % (ref 0–6)
ERYTHROCYTE [DISTWIDTH] IN BLOOD BY AUTOMATED COUNT: 12.4 % (ref 11.6–15.1)
GFR SERPL CREATININE-BSD FRML MDRD: 107 ML/MIN/1.73SQ M
GLUCOSE SERPL-MCNC: 96 MG/DL (ref 65–140)
HCG SERPL QL: NEGATIVE
HCT VFR BLD AUTO: 39.5 % (ref 34.8–46.1)
HGB BLD-MCNC: 13 G/DL (ref 11.5–15.4)
IMM GRANULOCYTES # BLD AUTO: 0.02 THOUSAND/UL (ref 0–0.2)
IMM GRANULOCYTES NFR BLD AUTO: 0 % (ref 0–2)
LIPASE SERPL-CCNC: 30 U/L (ref 11–82)
LYMPHOCYTES # BLD AUTO: 3.52 THOUSANDS/ÂΜL (ref 0.6–4.47)
LYMPHOCYTES NFR BLD AUTO: 54 % (ref 14–44)
MCH RBC QN AUTO: 28.7 PG (ref 26.8–34.3)
MCHC RBC AUTO-ENTMCNC: 32.9 G/DL (ref 31.4–37.4)
MCV RBC AUTO: 87 FL (ref 82–98)
MONOCYTES # BLD AUTO: 0.47 THOUSAND/ÂΜL (ref 0.17–1.22)
MONOCYTES NFR BLD AUTO: 7 % (ref 4–12)
NEUTROPHILS # BLD AUTO: 2.52 THOUSANDS/ÂΜL (ref 1.85–7.62)
NEUTS SEG NFR BLD AUTO: 38 % (ref 43–75)
NRBC BLD AUTO-RTO: 0 /100 WBCS
P AXIS: 35 DEGREES
PLATELET # BLD AUTO: 182 THOUSANDS/UL (ref 149–390)
PMV BLD AUTO: 10.7 FL (ref 8.9–12.7)
POTASSIUM SERPL-SCNC: 4 MMOL/L (ref 3.5–5.3)
PR INTERVAL: 186 MS
PROT SERPL-MCNC: 6.4 G/DL (ref 6.4–8.4)
QRS AXIS: 6 DEGREES
QRSD INTERVAL: 102 MS
QT INTERVAL: 414 MS
QTC INTERVAL: 447 MS
RBC # BLD AUTO: 4.53 MILLION/UL (ref 3.81–5.12)
SALMONELLA SP SPAO STL QL NAA+PROBE: NEGATIVE
SHIGELLA SP+EIEC IPAH STL QL NAA+PROBE: NEGATIVE
SODIUM SERPL-SCNC: 138 MMOL/L (ref 135–147)
T WAVE AXIS: -4 DEGREES
VENTRICULAR RATE: 70 BPM
WBC # BLD AUTO: 6.58 THOUSAND/UL (ref 4.31–10.16)

## 2025-06-17 PROCEDURE — 85025 COMPLETE CBC W/AUTO DIFF WBC: CPT

## 2025-06-17 PROCEDURE — 70450 CT HEAD/BRAIN W/O DYE: CPT

## 2025-06-17 PROCEDURE — 99285 EMERGENCY DEPT VISIT HI MDM: CPT | Performed by: EMERGENCY MEDICINE

## 2025-06-17 PROCEDURE — 93005 ELECTROCARDIOGRAM TRACING: CPT

## 2025-06-17 PROCEDURE — 87505 NFCT AGENT DETECTION GI: CPT | Performed by: EMERGENCY MEDICINE

## 2025-06-17 PROCEDURE — 93010 ELECTROCARDIOGRAM REPORT: CPT | Performed by: INTERNAL MEDICINE

## 2025-06-17 PROCEDURE — 84484 ASSAY OF TROPONIN QUANT: CPT | Performed by: EMERGENCY MEDICINE

## 2025-06-17 PROCEDURE — 83690 ASSAY OF LIPASE: CPT

## 2025-06-17 PROCEDURE — 74177 CT ABD & PELVIS W/CONTRAST: CPT

## 2025-06-17 PROCEDURE — 80053 COMPREHEN METABOLIC PANEL: CPT

## 2025-06-17 PROCEDURE — 99285 EMERGENCY DEPT VISIT HI MDM: CPT

## 2025-06-17 PROCEDURE — 73610 X-RAY EXAM OF ANKLE: CPT

## 2025-06-17 PROCEDURE — 84703 CHORIONIC GONADOTROPIN ASSAY: CPT | Performed by: EMERGENCY MEDICINE

## 2025-06-17 PROCEDURE — 36415 COLL VENOUS BLD VENIPUNCTURE: CPT

## 2025-06-17 RX ADMIN — IOHEXOL 100 ML: 350 INJECTION, SOLUTION INTRAVENOUS at 04:54

## 2025-06-17 NOTE — ED PROVIDER NOTES
"Time reflects when diagnosis was documented in both MDM as applicable and the Disposition within this note       Time User Action Codes Description Comment    6/17/2025  6:21 AM Marvin Lobo Add [R11.10] Vomiting     6/17/2025  6:22 AM Marvin Lobo Add [R10.9] Abdominal pain           ED Disposition       ED Disposition   Discharge    Condition   Stable    Date/Time   Tue Jun 17, 2025  6:21 AM    Comment   Yaritza Uzair discharge to home/self care.                   Assessment & Plan       Medical Decision Making  45-year-old female presents to the emergency room with multiple complaints.      Patient states her chief complaint is \"I've been having a lot of vomiting.\"  Patient further states \"I've been having soft stools.\"      Patient affirms abdominal pain and points to the left side when asked where her pain is located.    Patient also states she has had ongoing episodes of dizziness that she has difficulty fully qualifying but she states they have been ongoing for at least several months.  Patient denies any dizziness at present.     Patient also notes left ankle pain that has been ongoing for several days.  Patient denies any known injury.  Patient states she has a known \"spur\" that she has been seeing a podiatrist at her group home for but they have not attempted further treatment.    Patient denies urinary symptoms.  Patient denies vaginal bleeding or discharge.  Patient is unclear as to her last menstrual cycle.    Patient denies contacts with similar symptoms.      Focused Objective Exam:  Abdomen:  Inspection of an obese abdomen with previous abdominal surgical incisions noted without erythema, rashes or ecchymosis noted.  No abdominal pulsations noted.  Normal bowel sounds with no bruit auscultated.  Soft abdomen.  Palpitation noted diffuse numbness to palpation.  No masses or pulsatile aorta noted on examination.  No rebound or guarding noted on examination, non-peritoneal exam.    Back:  No rash or " signs of herpes zoster.  No CVA tenderness noted.   Skin:  No ecchymosis of the umbilicus (negative Sánchez's sign) or flank (negative Pennington Orozco's sign). Warm and dry.    Medical Decision Making    Multiple symptoms representing a broad differential.      Regarding patient's abdominal pain, this represents recurrent issue for the patient with recent ER visit without etiology identified.  Considering ongoing symptoms and patient's group home setting, will send C. difficile and enteric panel if patient is able to have a bowel movement while in the emergency room.  Considering her history and persistent findings, will obtain repeat evaluation though I discussed with the patient the possibility diagnostic uncertainty the need for follow-up for additional testing if workup does not identify the source of patient's symptoms.    Regarding patient's dizziness, this is a chronic issue for the patient and she is on meclizine chronically though there are symptoms only seem partially consistent with vertiginous etiology.  Regardless patient is well outside the window for any intervention regarding the possibility of CVA and she does have a known history of prior episode.  Will obtain imaging to evaluate for any hemorrhagic component but there is no indication for more immediate testing and I discussed with the patient the need for follow-up and likely multidisciplinary consultation considering she is on multiple medications which could cause the symptoms.  Patient's neurologic examination appears intact otherwise and she does not have any signs of central process on clinical examination.    Regarding patient's ankle pain, this also appears to be an acute on chronic issue for which the patient is already seeing podiatry at her facility.  Will obtain plain film imaging to evaluate for any fracture though I discussed the need for continued follow-up regarding this if this imaging is negative.  I discussed symptomatic management  with the patient.      Amount and/or Complexity of Data Reviewed  Labs: ordered.  Radiology: ordered and independent interpretation performed.    Risk  Prescription drug management.        ED Course as of 06/18/25 2148 Tue Jun 17, 2025 0621 Patient's laboratory evaluation and imaging are reassuring.  Chronic findings that I discussed with the patient.  Patient has not had a bowel movement in the emergency room and according to group home staff has not had any diarrhea in over a week.  This makes C. difficile and enteric pathogen's unlikely.  Considering her persistent findings I will provide her with referral to gastroenterology.  Discussed and emphasized return precautions in detail with the patient and the representative from her group home.       Medications   iohexol (OMNIPAQUE) 350 MG/ML injection (MULTI-DOSE) 100 mL (100 mL Intravenous Given 6/17/25 6902)       ED Risk Strat Scores                    No data recorded        SBIRT 20yo+      Flowsheet Row Most Recent Value   Initial Alcohol Screen: US AUDIT-C     1. How often do you have a drink containing alcohol? 0 Filed at: 06/17/2025 0320   2. How many drinks containing alcohol do you have on a typical day you are drinking?  0 Filed at: 06/17/2025 0320   3a. Male UNDER 65: How often do you have five or more drinks on one occasion? 0 Filed at: 06/17/2025 0320   3b. FEMALE Any Age, or MALE 65+: How often do you have 4 or more drinks on one occassion? 0 Filed at: 06/17/2025 0320   Audit-C Score 0 Filed at: 06/17/2025 0320   FABY: How many times in the past year have you...    Used an illegal drug or used a prescription medication for non-medical reasons? Never Filed at: 06/17/2025 0320                            History of Present Illness       Chief Complaint   Patient presents with    Dizziness    Vomiting     Pt reports being awoken from sleep and feeling dizzy and vomiting one time. Pt BIBA from group home. Pt seen recently for same. Pt reports feel  dehydrated.        Past Medical History[1]   Past Surgical History[2]   Family History[3]   Social History[4]   E-Cigarette/Vaping    E-Cigarette Use Never User       E-Cigarette/Vaping Substances    Nicotine No     THC No     CBD No     Flavoring No     Other No     Unknown No       I have reviewed and agree with the history as documented.     HPI    Review of Systems        Objective       ED Triage Vitals [06/17/25 0317]   Temperature Pulse Blood Pressure Respirations SpO2 Patient Position - Orthostatic VS   98.1 °F (36.7 °C) 76 127/96 20 99 % Lying      Temp Source Heart Rate Source BP Location FiO2 (%) Pain Score    Oral Monitor Left arm -- No Pain      Vitals      Date and Time Temp Pulse SpO2 Resp BP Pain Score FACES Pain Rating User   06/17/25 0600 -- 71 98 % 18 127/79 -- -- JR   06/17/25 0530 -- 75 98 % 18 136/77 -- -- JR   06/17/25 0526 -- 74 99 % 18 130/84 -- -- NO   06/17/25 0317 98.1 °F (36.7 °C) 76 99 % 20 127/96 No Pain -- JR            Physical Exam  HENT:      Mouth/Throat:      Pharynx: Oropharynx is clear.     Eyes:      Conjunctiva/sclera: Conjunctivae normal.      Pupils: Pupils are equal, round, and reactive to light.       Musculoskeletal:         General: Tenderness present.      Comments: Bilateral and symmetric lower extremity swelling that is not pitting.  No traumatic findings.  Tenderness diffusely over the lateral portion of the ankle including the ligaments.  Normal range of motion without pain with micromotion.  2+ DP pulse with appropriate capillary refill.  Normal sensory and motor including dorsiflexion plantarflexion of the great toe.     Neurological:      General: No focal deficit present.      Cranial Nerves: No cranial nerve deficit.      Sensory: No sensory deficit.      Motor: No weakness.      Coordination: Coordination normal.         Results Reviewed       Procedure Component Value Units Date/Time    Stool Enteric Bacterial Panel by PCR [[id number]]  (Normal) Collected:  06/17/25 0813    Lab Status: Final result Specimen: Stool from Rectum Updated: 06/17/25 1533     Salmonella sp PCR Negative     Shigella sp/Enteroinvasive E. coli (EIEC) PCR Negative     Campylobacter sp (jejuni and coli) PCR Negative     Shiga toxin 1/Shiga toxin 2 genes PCR Negative    Narrative:      This test has been performed using the FDA-approved BD MAX system for the qualitative detection of Salmonella spp., Campylobacter spp. (jejuni and coli), Shigella spp./Enteroinvasive E. coli (EIEC), and Shiga toxin 1 (stx1)/Shiga toxin 2 (stx2) from unpreserved liquid or soft stool or Layla-Christian preserved stool specimens from patients with suspected acute gastroenteritis, enteritis, or colitis using polymerase chain reaction (PCR) methodology.    Negative PCR results do not preclude infection and should not be used as the sole basis for treatment or other patient management decisions.    Positive PCR results are indicative of infection, but do not necessarily indicate the presence of viable organisms and do not rule out co-infection with other bacteria or viruses.    As with all polymerase-chain reaction methodology, extremely low levels of target below the limit of detection may be detected, but results may not be reproducible.    All positive results are reflexed to culture for confirmation and/or for susceptibility testing.    All test results should be used as an adjunct to clinical observations and other information available to the provider.    Clostridium difficile toxin by PCR with EIA [[id number]]  (Abnormal) Collected: 06/17/25 0813    Lab Status: Final result Specimen: Stool from Rectum Updated: 06/17/25 1533     C.difficile toxin by PCR Positive     C difficile Toxins A+B, EIA Negative    Narrative:      This test has been performed using either the FDA-approved EmergentDetection MAX system or the FDA-approved [employer] Software system for the qualitative detection of Clostridioides difficile toxin B gene (tcdB) in human liquid or  soft stool specimens from patients suspected of having Clostridioides difficile infection using polymerase chain reaction (PCR) methodology.    Negative PCR results do not preclude infection and should not be used as the sole basis for treatment or other patient management decisions.    Positive PCR results are indicative of colonization or infection, but do not rule out co-infection with other bacteria or viruses.    As with all polymerase-chain reaction methodology, extremely low levels of target below the limit of detection may be detected, but results may not be reproducible.    All positive results are reflexed to a toxin A & B enzyme immunoassay (EIA) to distinguish between active infection and colonization.  Positive EIA results are indicative of an active infection.  Negative EIA results are indicative of colonization without active injection.    All test results should be used as an adjunct to clinical observations and other information available to the provider.    HS Troponin I 2hr [[id number]]  (Normal) Collected: 06/17/25 0620    Lab Status: Final result Specimen: Blood from Arm, Right Updated: 06/17/25 0649     hs TnI 2hr 5 ng/L      Delta 2hr hsTnI 0 ng/L     HS Troponin 0hr (reflex protocol) [[id number]]  (Normal) Collected: 06/17/25 0350    Lab Status: Final result Specimen: Blood from Arm, Right Updated: 06/17/25 0416     hs TnI 0hr 5 ng/L     hCG, qualitative pregnancy [[id number]]  (Normal) Collected: 06/17/25 0350    Lab Status: Final result Specimen: Blood from Arm, Right Updated: 06/17/25 0416     Preg, Serum Negative    Lipase [[id number]]  (Normal) Collected: 06/17/25 0330    Lab Status: Final result Specimen: Blood from Arm, Right Updated: 06/17/25 0351     Lipase 30 u/L     Comprehensive metabolic panel [[id number]] Collected: 06/17/25 0330    Lab Status: Final result Specimen: Blood from Arm, Right Updated: 06/17/25 0351     Sodium 138 mmol/L      Potassium 4.0 mmol/L      Chloride 102 mmol/L       CO2 28 mmol/L      ANION GAP 8 mmol/L      BUN 18 mg/dL      Creatinine 0.63 mg/dL      Glucose 96 mg/dL      Calcium 9.0 mg/dL      AST 21 U/L      ALT 24 U/L      Alkaline Phosphatase 62 U/L      Total Protein 6.4 g/dL      Albumin 3.8 g/dL      Total Bilirubin 0.38 mg/dL      eGFR 107 ml/min/1.73sq m     Narrative:      National Kid[employer] guidelines for Chronic Kidney Disease (CKD):     Stage 1 with normal or high GFR (GFR > 90 mL/min/1.73 square meters)    Stage 2 Mild CKD (GFR = 60-89 mL/min/1.73 square meters)    Stage 3A Moderate CKD (GFR = 45-59 mL/min/1.73 square meters)    Stage 3B Moderate CKD (GFR = 30-44 mL/min/1.73 square meters)    Stage 4 Severe CKD (GFR = 15-29 mL/min/1.73 square meters)    Stage 5 End Stage CKD (GFR <15 mL/min/1.73 square meters)  Note: GFR calculation is accurate only with a steady state creatinine    CBC and differential [[id number]]  (Abnormal) Collected: 06/17/25 0330    Lab Status: Final result Specimen: Blood from Arm, Right Updated: 06/17/25 0335     WBC 6.58 Thousand/uL      RBC 4.53 Million/uL      Hemoglobin 13.0 g/dL      Hematocrit 39.5 %      MCV 87 fL      MCH 28.7 pg      MCHC 32.9 g/dL      RDW 12.4 %      MPV 10.7 fL      Platelets 182 Thousands/uL      nRBC 0 /100 WBCs      Segmented % 38 %      Immature Grans % 0 %      Lymphocytes % 54 %      Monocytes % 7 %      Eosinophils Relative 1 %      Basophils Relative 0 %      Absolute Neutrophils 2.52 Thousands/µL      Absolute Immature Grans 0.02 Thousand/uL      Absolute Lymphocytes 3.52 Thousands/µL      Absolute Monocytes 0.47 Thousand/µL      Eosinophils Absolute 0.03 Thousand/µL      Basophils Absolute 0.02 Thousands/µL             CT abdomen pelvis with contrast   Final Interpretation by Kash Robbins MD (06/17 0547)      No acute abnormality identified in the abdomen or pelvis.   Stable nonemergent findings as noted.         Workstation performed: [id number]         CT head without  contrast   Final Interpretation by Carmelo Pollock MD (06/17 3259)      1.  No acute intracranial abnormality   2.  Chronic right MCA territory infarct with                  Workstation performed: [id number]         XR ankle 3+ views LEFT   ED Interpretation by Marvin Lobo MD (06/17 0509)   Seems likely chronic deformities from prior injuries?      Final Interpretation by Carmelo Pollock MD (06/17 5797)      No acute osseous abnormality.      Degenerative changes as described.         Computerized Assisted Algorithm (CAA) may have been used to analyze all applicable images.               Workstation performed: [id number]             Procedures    ED Medication and Procedure Management   Prior to Admission Medications   Prescriptions Last Dose Informant Patient Reported? Taking?   ARIPiprazole (ABILIFY) 15 mg tablet   No No   Sig: Take 1 tablet (15 mg total) by mouth daily   Calcium 600-D 600-10 MG-MCG TABS   Yes No   Calcium Carb-Cholecalciferol (calcium carbonate-vitamin D) 500 mg-5 mcg tablet   No No   Sig: Take 1 tablet by mouth 2 (two) times a day with meals   Cholecalciferol (Vitamin D) 50 MCG (2000 UT) tablet   No No   Sig: Take 1 tablet (2,000 Units total) by mouth daily   D3 Super Strength 50 MCG (2000 UT) CAPS   Yes No   Multiple Vitamin (multivitamin) tablet   No No   Sig: Take 1 tablet by mouth daily   Omega-3 Fatty Acids (fish oil) 1,000 mg   No No   Sig: Take 1 capsule (1,000 mg total) by mouth 3 (three) times a day   acetaminophen (TYLENOL) 325 mg tablet  Care Giver, Self Yes No   Sig: Take 650 mg by mouth every 4 (four) hours as needed for mild pain   albuterol (PROVENTIL HFA,VENTOLIN HFA) 90 mcg/act inhaler  Care Giver, Self Yes No   Sig: Inhale 2 puffs every 6 (six) hours as needed for wheezing   bacitracin topical ointment 500 units/g topical ointment   No No   Sig: Apply 1 large application topically 3 (three) times a day to abrasions topically x 1 week   clonazePAM (KlonoPIN) 0.5 mg tablet   No  No   Sig: Take 1 tablet (0.5 mg total) by mouth 2 (two) times a day   cyanocobalamin (VITAMIN B-12) 1000 MCG tablet   No No   Sig: Take 1 tablet (1,000 mcg total) by mouth daily   dicyclomine (BENTYL) 20 mg tablet   No No   Sig: Take 1 tablet (20 mg total) by mouth 2 (two) times a day   divalproex sodium (DEPAKOTE ER) 500 mg 24 hr tablet   No No   Sig: Take 3 tablets (1,500 mg total) by mouth daily at bedtime   docusate sodium (COLACE) 100 mg capsule   No No   Sig: Take 1 capsule (100 mg total) by mouth daily   furosemide (LASIX) 40 mg tablet   No No   Sig: Take 1 tablet (40 mg total) by mouth daily   hydrocortisone (CORTEF) 10 mg tablet   Yes No   hydrocortisone (CORTEF) 5 mg tablet   No No   Sig: Take 3 tablets (15 mg total) by mouth daily   ibuprofen (MOTRIN) 800 mg tablet   No No   Sig: Take 1 tablet (800 mg total) by mouth 2 (two) times a day for 7 days   levothyroxine 100 mcg tablet   Yes No   Patient not taking: Reported on 1/21/2025   levothyroxine 112 mcg tablet   No No   Sig: Take 3 tablets (336 mcg total) by mouth 2 (two) times a week On Saturday and Sunday   levothyroxine 200 mcg tablet   No No   Sig: Take 1 tablet (200 mcg total) by mouth daily Take 2 tablets by mouth Monday-Friday   lidocaine (Lidoderm) 5 %   No No   Sig: Apply 1 patch topically over 12 hours daily as needed (pain) Remove & Discard patch within 12 hours or as directed by MD   Patient not taking: Reported on 1/21/2025   magnesium Oxide (MAG-OX) 400 mg TABS   No No   Sig: Take 1 tablet (400 mg total) by mouth 2 (two) times a day   magnesium oxide (MAG-OX) 400 mg tablet   Yes No   Patient not taking: Reported on 1/21/2025   meclizine (ANTIVERT) 25 mg tablet   No No   Sig: Take 1 tablet (25 mg total) by mouth every 12 (twelve) hours as needed for dizziness   meloxicam (MOBIC) 15 mg tablet   No No   Sig: Take 1 tablet (15 mg total) by mouth daily as needed for moderate pain   nystatin (MYCOSTATIN) powder   No No   Sig: Apply topically 2  (two) times a day   ondansetron (ZOFRAN-ODT) 4 mg disintegrating tablet   No No   Sig: Take 1 tablet (4 mg total) by mouth every 6 (six) hours as needed for nausea or vomiting   sertraline (ZOLOFT) 100 mg tablet   No No   Sig: Take 1 tablet (100 mg total) by mouth daily   triamcinolone (KENALOG) 0.025 % cream   Yes No   Patient not taking: Reported on 1/21/2025   triamcinolone (KENALOG) 0.1 % cream   No No   Sig: Apply topically 2 (two) times a day as needed (ezcema)   Patient not taking: Reported on 1/21/2025      Facility-Administered Medications: None     Discharge Medication List as of 6/17/2025  6:22 AM        CONTINUE these medications which have NOT CHANGED    Details   acetaminophen (TYLENOL) 325 mg tablet Take 650 mg by mouth every 4 (four) hours as needed for mild pain, Historical Med      albuterol (PROVENTIL HFA,VENTOLIN HFA) 90 mcg/act inhaler Inhale 2 puffs every 6 (six) hours as needed for wheezing, Historical Med      ARIPiprazole (ABILIFY) 15 mg tablet Take 1 tablet (15 mg total) by mouth daily, Starting Fri 3/29/2024, Until Sat 8/17/2024, Normal      bacitracin topical ointment 500 units/g topical ointment Apply 1 large application topically 3 (three) times a day to abrasions topically x 1 week, Starting Mon 1/13/2025, Normal      Calcium 600-D 600-10 MG-MCG TABS Historical Med      Calcium Carb-Cholecalciferol (calcium carbonate-vitamin D) 500 mg-5 mcg tablet Take 1 tablet by mouth 2 (two) times a day with meals, Starting Fri 3/29/2024, Until Sat 8/17/2024, Normal      Cholecalciferol (Vitamin D) 50 MCG (2000 UT) tablet Take 1 tablet (2,000 Units total) by mouth daily, Starting Fri 3/29/2024, Normal      clonazePAM (KlonoPIN) 0.5 mg tablet Take 1 tablet (0.5 mg total) by mouth 2 (two) times a day, Starting Fri 3/29/2024, Until Tue 1/21/2025, Normal      cyanocobalamin (VITAMIN B-12) 1000 MCG tablet Take 1 tablet (1,000 mcg total) by mouth daily, Starting Fri 3/29/2024, Until Sun 4/28/2024, Normal       D3 Super Strength 50 MCG (2000 UT) CAPS Historical Med      dicyclomine (BENTYL) 20 mg tablet Take 1 tablet (20 mg total) by mouth 2 (two) times a day, Starting Thu 6/5/2025, Print      divalproex sodium (DEPAKOTE ER) 500 mg 24 hr tablet Take 3 tablets (1,500 mg total) by mouth daily at bedtime, Starting Fri 3/29/2024, Until Sat 8/17/2024, Normal      docusate sodium (COLACE) 100 mg capsule Take 1 capsule (100 mg total) by mouth daily, Starting Fri 3/29/2024, Normal      furosemide (LASIX) 40 mg tablet Take 1 tablet (40 mg total) by mouth daily, Starting Sat 3/30/2024, Normal      !! hydrocortisone (CORTEF) 10 mg tablet Historical Med      !! hydrocortisone (CORTEF) 5 mg tablet Take 3 tablets (15 mg total) by mouth daily, Starting Sat 3/30/2024, Normal      ibuprofen (MOTRIN) 800 mg tablet Take 1 tablet (800 mg total) by mouth 2 (two) times a day for 7 days, Starting Tue 1/21/2025, Until Tue 1/28/2025, Normal      !! levothyroxine 100 mcg tablet Historical Med      !! levothyroxine 112 mcg tablet Take 3 tablets (336 mcg total) by mouth 2 (two) times a week On Saturday and Sunday, Starting Mon 4/1/2024, Normal      !! levothyroxine 200 mcg tablet Take 1 tablet (200 mcg total) by mouth daily Take 2 tablets by mouth Monday-Friday, Starting Fri 3/29/2024, Normal      lidocaine (Lidoderm) 5 % Apply 1 patch topically over 12 hours daily as needed (pain) Remove & Discard patch within 12 hours or as directed by MD, Starting Fri 3/29/2024, Normal      magnesium oxide (MAG-OX) 400 mg tablet Historical Med      magnesium Oxide (MAG-OX) 400 mg TABS Take 1 tablet (400 mg total) by mouth 2 (two) times a day, Starting Fri 3/29/2024, Until Sat 8/17/2024, Normal      meclizine (ANTIVERT) 25 mg tablet Take 1 tablet (25 mg total) by mouth every 12 (twelve) hours as needed for dizziness, Starting Fri 3/29/2024, Normal      meloxicam (MOBIC) 15 mg tablet Take 1 tablet (15 mg total) by mouth daily as needed for moderate pain,  Starting Fri 3/29/2024, Normal      Multiple Vitamin (multivitamin) tablet Take 1 tablet by mouth daily, Starting Fri 3/29/2024, Normal      nystatin (MYCOSTATIN) powder Apply topically 2 (two) times a day, Starting Fri 3/29/2024, Normal      Omega-3 Fatty Acids (fish oil) 1,000 mg Take 1 capsule (1,000 mg total) by mouth 3 (three) times a day, Starting Fri 3/29/2024, Normal      ondansetron (ZOFRAN-ODT) 4 mg disintegrating tablet Take 1 tablet (4 mg total) by mouth every 6 (six) hours as needed for nausea or vomiting, Starting Thu 6/5/2025, Print      sertraline (ZOLOFT) 100 mg tablet Take 1 tablet (100 mg total) by mouth daily, Starting Sat 3/30/2024, Until Sat 8/17/2024, Normal      triamcinolone (KENALOG) 0.025 % cream Historical Med      triamcinolone (KENALOG) 0.1 % cream Apply topically 2 (two) times a day as needed (ezcema), Starting Fri 3/29/2024, Normal       !! - Potential duplicate medications found. Please discuss with provider.        No discharge procedures on file.  ED SEPSIS DOCUMENTATION   Time reflects when diagnosis was documented in both MDM as applicable and the Disposition within this note       Time User Action Codes Description Comment    6/17/2025  6:21 AM Marvin Lobo [R11.10] Vomiting     6/17/2025  6:22 AM Marvin Lobo [R10.9] Abdominal pain                      [1]   Past Medical History:  Diagnosis Date    Adrenocortical insufficiency (HCC)     Bipolar disorder (HCC)     CVA (cerebral vascular accident) (HCC)     Disease of thyroid gland     hypo    Disorder of bone density and structure, unspecified     Epilepsy (HCC)     Hyperlipidemia     Hypopituitarism (HCC)     Mild intellectual disabilities     Psychiatric disorder     bipolar   [2]   Past Surgical History:  Procedure Laterality Date    EPIDURAL BLOCK INJECTION N/A 8/23/2023    Procedure: L3-L4  LUMBAR epidural steroid injection (11906);  Surgeon: Adam Hayden DO;  Location: [facility] MAIN OR;  Service: Pain  Management    [3] No family history on file.  [4]   Social History  Tobacco Use    Smoking status: Never    Smokeless tobacco: Never   Vaping Use    Vaping status: Never Used   Substance Use Topics    Alcohol use: Not Currently    Drug use: Not Currently        Marvin Lobo MD  06/18/25 8843

## 2025-06-17 NOTE — ED NOTES
Chiara Da Silva from [facility] contacted at this time to pick patient up. Chiara to be here soon for patient. Provider aware.      Rere Dias RN  06/17/25 0783

## 2025-06-18 ENCOUNTER — RESULTS FOLLOW-UP (OUTPATIENT)
Dept: EMERGENCY DEPT | Facility: HOSPITAL | Age: 45
End: 2025-06-18

## (undated) DEVICE — TRAY EPIDURAL PERIFIX 20GA X 3.5IN TUOHY 8ML

## (undated) DEVICE — CHLORAPREP APPLICATOR TINTED 10.5ML ONE-STEP

## (undated) DEVICE — TOWEL SET X-RAY

## (undated) DEVICE — TRAY PAIN SUPPORT

## (undated) DEVICE — NEEDLE TUOHY 20G X 6 IN

## (undated) DEVICE — PLASTIC ADHESIVE BANDAGE: Brand: CURITY

## (undated) DEVICE — RADIOLOGY STERILE LABELS: Brand: CENTURION

## (undated) DEVICE — SYRINGE 5ML LL

## (undated) DEVICE — GLOVE SRG BIOGEL 7.5

## (undated) DEVICE — IV SET EXT SM BORE CARESITE 8IN

## (undated) DEVICE — SYRINGE EPI 8ML LUER SLIP LOSS OF RESISTANCE PLASTIC PERFIX